# Patient Record
Sex: MALE | Race: WHITE | NOT HISPANIC OR LATINO | Employment: OTHER | URBAN - METROPOLITAN AREA
[De-identification: names, ages, dates, MRNs, and addresses within clinical notes are randomized per-mention and may not be internally consistent; named-entity substitution may affect disease eponyms.]

---

## 2017-01-05 ENCOUNTER — TRANSFERRED RECORDS (OUTPATIENT)
Dept: HEALTH INFORMATION MANAGEMENT | Facility: CLINIC | Age: 72
End: 2017-01-05

## 2017-01-10 ENCOUNTER — TELEPHONE (OUTPATIENT)
Dept: PEDIATRICS | Facility: CLINIC | Age: 72
End: 2017-01-10

## 2017-01-10 NOTE — TELEPHONE ENCOUNTER
Approval faxed received from [facility] Temazepam 30 mg cap approved from 12/11/16-01/10/2018 ID (PBP code): [id number]  Case ID: [id number]  Pharmacy notified//Ligia Renteria MA// January 10, 2017 1:55 PM

## 2017-01-10 NOTE — TELEPHONE ENCOUNTER
Letter from [facility]   The is formulary but requires PA, [facility] would not continue to pay for the drug after receiving max of 30 days temporary supply.     This writer initiated PA via [email or web address]   //Ligia Renteria MA// January 10, 2017 10:08 AM        This request has been approved  Hood Arizmendi (Gomez: [id number])   CaseId:[id number];Product Name:ST: High Risk Medications [Onfi, Generic alprazolam tablet, clonazepam, clorazepate, diazepam(oral, rectal), lorazepam(oral), oxazepam, temazepam] *[employer] MEDICARE*;Status:Approved;Coverage Start Date:12/11/2016;Coverage End Date:01/10/2018;  //Ligia Renteria MA// January 10, 2017 10:13 AM

## 2017-01-13 ENCOUNTER — MYC MEDICAL ADVICE (OUTPATIENT)
Dept: PEDIATRICS | Facility: CLINIC | Age: 72
End: 2017-01-13

## 2017-01-13 NOTE — TELEPHONE ENCOUNTER
Per chart review, PA for temazepam was approved on 1/10: Approval faxed received from [facility] Temazepam 30 mg cap approved from 12/11/16-01/10/2018    Colette Chiang RN  Message handled by Nurse Triage.

## 2017-01-26 ENCOUNTER — TELEPHONE (OUTPATIENT)
Dept: PEDIATRICS | Facility: CLINIC | Age: 72
End: 2017-01-26

## 2017-01-26 DIAGNOSIS — I25.10 CORONARY ARTERY DISEASE INVOLVING NATIVE CORONARY ARTERY OF NATIVE HEART WITHOUT ANGINA PECTORIS: Primary | ICD-10-CM

## 2017-01-26 RX ORDER — METOPROLOL SUCCINATE 50 MG/1
50 TABLET, EXTENDED RELEASE ORAL DAILY
Qty: 90 TABLET | Refills: 4 | Status: SHIPPED
Start: 2017-01-26 | End: 2018-02-23

## 2017-01-30 ENCOUNTER — TRANSFERRED RECORDS (OUTPATIENT)
Dept: HEALTH INFORMATION MANAGEMENT | Facility: CLINIC | Age: 72
End: 2017-01-30

## 2017-02-22 DIAGNOSIS — I25.10 CORONARY ARTERY DISEASE INVOLVING NATIVE HEART WITHOUT ANGINA PECTORIS, UNSPECIFIED VESSEL OR LESION TYPE: ICD-10-CM

## 2017-02-22 DIAGNOSIS — I25.10 CORONARY ARTERY DISEASE INVOLVING NATIVE CORONARY ARTERY OF NATIVE HEART WITHOUT ANGINA PECTORIS: ICD-10-CM

## 2017-02-22 RX ORDER — ISOSORBIDE MONONITRATE 30 MG/1
30 TABLET, EXTENDED RELEASE ORAL DAILY
Qty: 90 TABLET | Refills: 2 | Status: SHIPPED | OUTPATIENT
Start: 2017-02-22 | End: 2017-05-25

## 2017-02-23 ENCOUNTER — OFFICE VISIT (OUTPATIENT)
Dept: PEDIATRICS | Facility: CLINIC | Age: 72
End: 2017-02-23
Payer: COMMERCIAL

## 2017-02-23 VITALS
TEMPERATURE: 98 F | WEIGHT: 251.9 LBS | DIASTOLIC BLOOD PRESSURE: 80 MMHG | HEART RATE: 68 BPM | RESPIRATION RATE: 20 BRPM | OXYGEN SATURATION: 95 % | SYSTOLIC BLOOD PRESSURE: 116 MMHG | HEIGHT: 74 IN | BODY MASS INDEX: 32.33 KG/M2

## 2017-02-23 DIAGNOSIS — I10 ESSENTIAL HYPERTENSION: ICD-10-CM

## 2017-02-23 DIAGNOSIS — H33.22 RETINAL DETACHMENT, LEFT: ICD-10-CM

## 2017-02-23 DIAGNOSIS — Z01.818 PREOP GENERAL PHYSICAL EXAM: Primary | ICD-10-CM

## 2017-02-23 DIAGNOSIS — K51.90 ULCERATIVE COLITIS WITHOUT COMPLICATIONS, UNSPECIFIED LOCATION (H): ICD-10-CM

## 2017-02-23 DIAGNOSIS — I25.9 CHRONIC ISCHEMIC HEART DISEASE: ICD-10-CM

## 2017-02-23 PROCEDURE — 93000 ELECTROCARDIOGRAM COMPLETE: CPT | Performed by: INTERNAL MEDICINE

## 2017-02-23 PROCEDURE — 99214 OFFICE O/P EST MOD 30 MIN: CPT | Performed by: INTERNAL MEDICINE

## 2017-02-23 NOTE — MR AVS SNAPSHOT
After Visit Summary   2/23/2017    Hood DEVINE Page    MRN: [id number]           Patient Information     Date Of Birth          1945        Visit Information        Provider Department      2/23/2017 10:20 AM Micahel Oliver [facility]        Today's Diagnoses     Preop general physical exam    -  1    Retinal detachment, left        Chronic ischemic heart disease        Essential hypertension        Ulcerative colitis without complications, unspecified location (H)          Care Instructions      Before Your Surgery      Call your surgeon if there is any change in your health. This includes signs of a cold or flu (such as a sore throat, runny nose, cough, rash or fever).    Do not smoke, drink alcohol or take over the counter medicine (unless your surgeon or primary care doctor tells you to) for the 24 hours before and after surgery.    If you take prescribed drugs: Follow your doctor s orders about which medicines to take and which to stop until after surgery.    Eating and drinking prior to surgery: follow the instructions from your surgeon    Take a shower or bath the night before surgery. Use the soap your surgeon gave you to gently clean your skin. If you do not have soap from your surgeon, use your regular soap. Do not shave or scrub the surgery site.  Wear clean pajamas and have clean sheets on your bed.         Follow-ups after your visit        Who to contact     If you have questions or need follow up information about today's clinic visit or your schedule please contact [facility] directly at [phone].  Normal or non-critical lab and imaging results will be communicated to you by MyChart, letter or phone within 4 business days after the clinic has received the results. If you do not hear from us within 7 days, please contact the clinic through MyChart or phone. If you have a critical or abnormal lab result, we will notify you by phone as soon as  "possible.  Submit refill requests through [employer] or call your pharmacy and they will forward the refill request to us. Please allow 3 business days for your refill to be completed.          Additional Information About Your Visit        [employer] Information     [employer] gives you secure access to your electronic health record. If you see a primary care provider, you can also send messages to your care team and make appointments. If you have questions, please call your primary care clinic.  If you do not have a primary care provider, please call [phone] and they will assist you.        Care EveryWhere ID     This is your Care EveryWhere ID. This could be used by other organizations to access your [facility] medical records  [id number]        Your Vitals Were     Pulse Temperature Respirations Height Pulse Oximetry BMI (Body Mass Index)    68 98  F (36.7  C) (Oral) 20 6' 1.5\" (1.867 m) 95% 32.78 kg/m2       Blood Pressure from Last 3 Encounters:   02/23/17 116/80   12/13/16 124/78   09/23/16 117/71    Weight from Last 3 Encounters:   02/23/17 251 lb 14.4 oz (114.3 kg)   12/13/16 254 lb 9 oz (115.5 kg)   09/23/16 237 lb 4.8 oz (107.6 kg)              We Performed the Following     EKG 12-lead complete w/read - Clinics          Today's Medication Changes          These changes are accurate as of: 2/23/17 11:09 AM.  If you have any questions, ask your nurse or doctor.               Stop taking these medicines if you haven't already. Please contact your care team if you have questions.     prednisoLONE acetate 1 % ophthalmic susp   Commonly known as:  PRED FORTE   Stopped by:  Michael Oliver MD                    Primary Care Provider Office Phone # Fax #    Michael Oliver [phone]       [facility] [address] [place] [address]        Thank you!     Thank you for choosing [facility]  for your care. Our goal is always to provide you with excellent care. Hearing " back from our patients is one way we can continue to improve our services. Please take a few minutes to complete the written survey that you may receive in the mail after your visit with us. Thank you!             Your Updated Medication List - Protect others around you: Learn how to safely use, store and throw away your medicines at www.disposemymeds.org.          This list is accurate as of: 2/23/17 11:09 AM.  Always use your most recent med list.                   Brand Name Dispense Instructions for use    amLODIPine 2.5 MG tablet    NORVASC    90 tablet    Take 1 tablet (2.5 mg) by mouth daily       aspirin 81 MG tablet      Take 81 mg by mouth 2 times daily       balsalazide 750 MG capsule    COLAZAL     Take 2 tablets twice daily       CALCIUM 1200 PO      Take by mouth daily       calcium citrate + Tabs      Take 2,000 Units by mouth daily       cetirizine 10 MG tablet    zyrTEC    30 tablet    Take 10 mg by mouth daily       cholecalciferol 1000 UNIT tablet    vitamin D    100 tablet    Take 3,000 Units by mouth daily       fluticasone 50 MCG/ACT spray    FLONASE    9.9 mL    Spray 1-2 sprays into both nostrils daily       isosorbide mononitrate 30 MG 24 hr tablet    IMDUR    90 tablet    Take 1 tablet (30 mg) by mouth daily       melatonin 3 MG tablet      Take 1 tablet (3 mg) by mouth nightly as needed for sleep       METAMUCIL SMOOTH TEXTURE 63 % Powd   Generic drug:  psyllium     1 Bottle    Take 2 teaspoonful by mouth daily Mix in 8 ounces of water       metoprolol 50 MG 24 hr tablet    TOPROL-XL    90 tablet    Take 1 tablet (50 mg) by mouth daily       MIRALAX powder   Generic drug:  polyethylene glycol     510 g    Take 17 g by mouth daily       misc intestinal len regulat Caps      Take 1 capsule by mouth daily       Multi-vitamin Tabs tablet     100 tablet    Take 1 tablet by mouth daily       order for DME     1 each    Equipment being ordered: CPAP and supplies       ranolazine 500 MG 12 hr  tablet    RANEXA    180 tablet    Take 1 tablet (500 mg) by mouth every 12 hours       rosuvastatin 20 MG tablet    CRESTOR    90 tablet    Take 1 tablet (20 mg) by mouth daily       temazepam 30 MG capsule    RESTORIL    90 capsule    Take 1 capsule (30 mg) by mouth nightly as needed for sleep

## 2017-02-23 NOTE — NURSING NOTE
"Chief Complaint   Patient presents with     Pre-Op Exam       Initial /80 (BP Location: Right arm, Patient Position: Other (Comments), Cuff Size: Adult Large)  Pulse 68  Temp 98  F (36.7  C) (Oral)  Resp 20  Ht 6' 1.5\" (1.867 m)  Wt 251 lb 14.4 oz (114.3 kg)  SpO2 95%  BMI 32.78 kg/m2 Estimated body mass index is 32.78 kg/(m^2) as calculated from the following:    Height as of this encounter: 6' 1.5\" (1.867 m).    Weight as of this encounter: 251 lb 14.4 oz (114.3 kg).  Medication Reconciliation: complete   Stacey UMAÑA, DORIS,AAMA      "

## 2017-02-23 NOTE — PROGRESS NOTES
.    [facility]  [address]  [address]  [place] [address]  [phone]  Dept: [phone]    PRE-OP EVALUATION:  Today's date: 2017    Hood Arizmendi (: 1945) presents for pre-operative evaluation assessment as requested by Dr. Dent.  He requires evaluation and anesthesia risk assessment prior to undergoing surgery/procedure for treatment of Retina .  Proposed procedure: eye surgery    Date of Surgery/ Procedure: 2017  Time of Surgery/ Procedure: 10:30 am  Hospital/Surgical Facility: [facility]  Fax number for surgical facility: [phone] and [phone]  Primary Physician: Michael Oliver  Type of Anesthesia Anticipated: General    Patient has a Health Care Directive or Living Will:  YES     1. NO - Do you have a history of heart attack, stroke, stent, bypass or surgery on an artery in the head, neck, heart or legs?  2. yes - Do you ever have any pain or discomfort in your chest?  3. NO - Do you have a history of  Heart Failure?  4. NO - Are you troubled by shortness of breath when: walking on the level, up a slight hill or at night?  5. NO - Do you currently have a cold, bronchitis or other respiratory infection?  6. NO - Do you have a cough, shortness of breath or wheezing?  7. NO - Do you sometimes get pains in the calves of your legs when you walk?  8. NO - Do you or anyone in your family have previous history of blood clots?  9. NO - Do you or does anyone in your family have a serious bleeding problem such as prolonged bleeding following surgeries or cuts?  10. NO - Have you ever had problems with anemia or been told to take iron pills?  11. yes- Have you had any abnormal blood loss such as black, tarry or bloody stools, or abnormal vaginal bleeding?  12. NO - Have you ever had a blood transfusion?  13. NO - Have you or any of your relatives ever had problems with anesthesia?  14. yes - Do you have sleep apnea, excessive snoring or  daytime drowsiness?  15. NO - Do you have any prosthetic heart valves?  16. NO - Do you have prosthetic joints?  17. NO - Is there any chance that you may be pregnant?      HPI:                                                      Brief HPI related to upcoming procedure: Retinal scar tissue removal.       See problem list for active medical problems.  Problems all longstanding and stable, except as noted/documented.  See ROS for pertinent symptoms related to these conditions.                                                                                                  .  HYPERTENSION - Patient has longstanding history of mod-severe HTN , currently denies any symptoms referable to elevated blood pressure. Specifically denies chest pain, palpitations, dyspnea, orthopnea, PND or peripheral edema. Blood pressure readings have been in normal range. Current medication regimen is as listed below. Patient denies any side effects of medication.                                                                                                                                                                                          .  HYPERLIPIDEMIA - Patient has a long history of significant Hyperlipidemia requiring medication for treatment with recent good control. Patient reports no problems or side effects with the medication.                                                                                                                                                       .  CAD - Patient has a longstanding history of mod-severe CAD. Patient denies recent chest pain or NTG use, denies exercise induced dyspnea or PND. Last cath 2016 (below).                                                                                                                            .  obstructive sleep apnea: on continuous positive airway pressure.     SUMMARY:   1. Chronic stable angina.  2. Obstructive coronary artery disease to a very  small caliber, small  right posterolateral branch, known from prior coronary angiograms.  Otherwise non-obstructive coronary artery disease.  3. Normal LV systolic function.  4. Mildly elevated LVEDP (19mmHg)  5. No evidence of aortic valve stenosis by catheter pullback method.  6. No evidence of peripheral arterial disease.  7. Arterial access site closed with closure device    PLAN:   1. Aspirin 81 mg po daily lifelong.  2. Continued aggressive medical management of chronic stable angina  and lifestyle modification for cardiovascular risk factor  optimization.   3. Bedrest and access site monitoring per protocol.  4. Discharge today per protocol.    MEDICAL HISTORY:                                                      Patient Active Problem List    Diagnosis Date Noted     Insomnia, unspecified type 07/25/2016     Priority: Medium     Essential hypertension, hypertension with unspecified goal 07/25/2016     Priority: Medium     Coronary artery disease involving native coronary artery of native heart without angina pectoris 07/25/2016     Priority: Medium     Ulcerative colitis (H) 08/28/2014     Priority: Medium     Followed by [place] [facility]; every 2 years colonoscopy       Hyperlipidemia with target LDL less than 100 08/28/2014     Priority: Medium     Diagnosis updated by automated process. Provider to review and confirm.       Kidney stones 08/28/2014     Priority: Medium     Carotid artery stenosis      DARYL (obstructive sleep apnea) 08/28/2014     On CPAP 6/12, and temazepam alternating with zolpidem       Chronic ischemic heart disease 08/28/2014     Medical management.   Problem list name updated by automated process. Provider to review       Seasonal allergic rhinitis 08/28/2014     Vitamin D deficiency 08/28/2014     Problem list name updated by automated process. Provider to review        Past Medical History   Diagnosis Date     CAD (coronary artery disease)      small vessel disease     Carotid  artery stenosis      Chronic stable angina (H)      HTN (hypertension)      Hyperlipidemia      Past Surgical History   Procedure Laterality Date     Surgical history of -        tonsils     Surgical history of -        cystoscopy for kidney stones     Surgical history of -        s/p appy     Surgical history of -        nasal surgery     Coronary angiography adult order  2011, 2016     med tx, small vessels     Current Outpatient Prescriptions   Medication Sig Dispense Refill     isosorbide mononitrate (IMDUR) 30 MG 24 hr tablet Take 1 tablet (30 mg) by mouth daily 90 tablet 2     metoprolol (TOPROL-XL) 50 MG 24 hr tablet Take 1 tablet (50 mg) by mouth daily 90 tablet 4     fluticasone (FLONASE) 50 MCG/ACT spray Spray 1-2 sprays into both nostrils daily 9.9 mL 11     temazepam (RESTORIL) 30 MG capsule Take 1 capsule (30 mg) by mouth nightly as needed for sleep 90 capsule 1     prednisoLONE acetate (PRED FORTE) 1 % ophthalmic suspension 1 drop 4 times daily       amLODIPine (NORVASC) 2.5 MG tablet Take 1 tablet (2.5 mg) by mouth daily 90 tablet 1     aspirin 81 MG tablet Take 81 mg by mouth 2 times daily       Calcium Carbonate-Vit D-Min (CALCIUM 1200 PO) Take by mouth daily       rosuvastatin (CRESTOR) 20 MG tablet Take 1 tablet (20 mg) by mouth daily 90 tablet 3     ranolazine (RANEXA) 500 MG 12 hr tablet Take 1 tablet (500 mg) by mouth every 12 hours 180 tablet 4     balsalazide (COLAZAL) 750 MG capsule Take 2 tablets twice daily       ORDER FOR DME Equipment being ordered: CPAP and supplies 1 each 0     cetirizine (ZYRTEC) 10 MG tablet Take 10 mg by mouth daily  30 tablet 1     melatonin 3 MG tablet Take 1 tablet (3 mg) by mouth nightly as needed for sleep       psyllium (METAMUCIL SMOOTH TEXTURE) 63 % POWD Take 2 teaspoonful by mouth daily Mix in 8 ounces of water 1 Bottle 11     polyethylene glycol (MIRALAX) powder Take 17 g by mouth daily 510 g 1     Probiotic Product (MISC INTESTINAL GREGORY REGULAT) CAPS  "Take 1 capsule by mouth daily       multivitamin, therapeutic with minerals (MULTI-VITAMIN) TABS Take 1 tablet by mouth daily 100 tablet 3     cholecalciferol (VITAMIN D) 1000 UNIT tablet Take 3,000 Units by mouth daily  100 tablet 3     Multiple Minerals-Vitamins (CALCIUM CITRATE +) TABS Take 2,000 Units by mouth daily       OTC products: None, except as noted above    Allergies   Allergen Reactions     Sulfa Drugs       Latex Allergy: NO    Social History   Substance Use Topics     Smoking status: Never Smoker     Smokeless tobacco: Never Used     Alcohol use 4.2 oz/week     7 Standard drinks or equivalent per week      Comment: 1 per day     History   Drug Use No       REVIEW OF SYSTEMS:                                                    C: NEGATIVE for fever, chills, change in weight  I: NEGATIVE for worrisome rashes, moles or lesions  E: NEGATIVE for vision changes or irritation  E/M: NEGATIVE for ear, mouth and throat problems  R: NEGATIVE for significant cough or SOB  B: NEGATIVE for masses, tenderness or discharge  CV: NEGATIVE for chest pain, palpitations or peripheral edema  GI: NEGATIVE for nausea, abdominal pain, heartburn, or change in bowel habits  : NEGATIVE for frequency, dysuria, or hematuria  M: NEGATIVE for significant arthralgias or myalgia  N: NEGATIVE for weakness, dizziness or paresthesias  E: NEGATIVE for temperature intolerance, skin/hair changes  H: NEGATIVE for bleeding problems  P: NEGATIVE for changes in mood or affect    EXAM:                                                    /80 (BP Location: Right arm, Patient Position: Other (Comments), Cuff Size: Adult Large)  Pulse 68  Temp 98  F (36.7  C) (Oral)  Resp 20  Ht 6' 1.5\" (1.867 m)  Wt 251 lb 14.4 oz (114.3 kg)  SpO2 95%  BMI 32.78 kg/m2    GENERAL APPEARANCE: healthy, alert and no distress     EYES: EOMI, - PERRL     HENT: ear canals and TM's normal and nose and mouth without ulcers or lesions     NECK: no adenopathy, no " asymmetry, masses, or scars and thyroid normal to palpation     RESP: lungs clear to auscultation - no rales, rhonchi or wheezes     CV: regular rates and rhythm, normal S1 S2, no S3 or S4 and no murmur, click or rub -     ABDOMEN:  soft, nontender, no HSM or masses and bowel sounds normal     MS: extremities normal- no gross deformities noted, no evidence of inflammation in joints, FROM in all extremities.     SKIN: no suspicious lesions or rashes     NEURO: Normal strength and tone, sensory exam grossly normal, mentation intact and speech normal     PSYCH: mentation appears normal. and affect normal/bright     LYMPHATICS: No axillary, cervical, inguinal, or supraclavicular nodes    DIAGNOSTICS:                                                    EKG: appears normal, NSR, normal axis, normal intervals, no acute ST/T changes c/w ischemia, no LVH by voltage criteria, unchanged from previous tracings    Recent Labs   Lab Test  12/13/16   0942  08/26/16   1108  04/25/14   HGB  14.0  15.2   --    --    PLT  221  195   --    --    INR   --   0.91   --    --    NA  140  141   < >   --    POTASSIUM  3.7  4.2   < >  4.4   CR  0.88  0.84   < >  0.94   A1C   --    --    --   5.8    < > = values in this interval not displayed.        IMPRESSION:                                                    Reason for surgery/procedure: retinal scarring.   Diagnosis/reason for consult: preoperative evaluation     The proposed surgical procedure is considered LOW risk.    REVISED CARDIAC RISK INDEX  The patient has the following serious cardiovascular risks for perioperative complications such as (MI, PE, VFib and 3  AV Block):  Coronary Artery Disease (MI, positive stress test, angina, Qs on EKG)  INTERPRETATION: 1 risks: Class II (low risk - 0.9% complication rate)    The patient has the following additional risks for perioperative complications:  No identified additional risks      ICD-10-CM    1. Pre-operative cardiovascular examination  Z01.810 EKG 12-lead complete w/read - Clinics       RECOMMENDATIONS:                                                      (Z01.818) Preop general physical exam  (primary encounter diagnosis)  Comment:   Plan: Advised to stop all aspirin products and nonsteroidals (like ibuprofen or naproxen) for 10 days prior to procedure.  Advised follow surgical center's instructions re: arrival time and when to stop eating.     (H33.22) Retinal detachment, left  Comment:   Plan: Surgical and post-op care per primary surgical service.      (I25.9) Chronic ischemic heart disease  Comment:   Plan: secondary risk factor modification     (I10) Essential hypertension  Comment:   Plan: Well controlled.     (K51.90) Ulcerative colitis without complications, unspecified location (H)  Comment:   Plan: Continue current treatment.           Signed Electronically by: Michael Oliver MD    Copy of this evaluation report is provided to requesting physician.    [facility] Preop Guidelines

## 2017-03-01 DIAGNOSIS — G47.00 INSOMNIA, UNSPECIFIED TYPE: ICD-10-CM

## 2017-03-01 NOTE — TELEPHONE ENCOUNTER
TEMAZEPAM 30MG      Last Written Prescription Date:  9/13/2016  Last Fill Quantity: 90,   # refills: 1  Last Office Visit with [facility], [facility] or  Health prescribing provider: 2/23/2017  Future Office visit:       Routing refill request to provider for review/approval because:  Drug not on the [facility], [facility] or  Health refill protocol or controlled substance

## 2017-03-02 RX ORDER — TEMAZEPAM 30 MG
30 CAPSULE ORAL
Qty: 90 CAPSULE | Refills: 1 | Status: SHIPPED | OUTPATIENT
Start: 2017-03-02 | End: 2017-09-11

## 2017-03-06 DIAGNOSIS — I25.10 CORONARY ARTERY DISEASE INVOLVING NATIVE CORONARY ARTERY OF NATIVE HEART WITHOUT ANGINA PECTORIS: ICD-10-CM

## 2017-03-06 RX ORDER — AMLODIPINE BESYLATE 2.5 MG/1
2.5 TABLET ORAL DAILY
Qty: 90 TABLET | Refills: 3 | Status: SHIPPED | OUTPATIENT
Start: 2017-03-06 | End: 2017-05-16

## 2017-04-04 ENCOUNTER — OFFICE VISIT (OUTPATIENT)
Dept: CARDIOLOGY | Facility: CLINIC | Age: 72
End: 2017-04-04
Attending: NURSE PRACTITIONER
Payer: COMMERCIAL

## 2017-04-04 VITALS
DIASTOLIC BLOOD PRESSURE: 62 MMHG | HEIGHT: 74 IN | HEART RATE: 72 BPM | SYSTOLIC BLOOD PRESSURE: 124 MMHG | WEIGHT: 244 LBS | BODY MASS INDEX: 31.32 KG/M2

## 2017-04-04 DIAGNOSIS — I25.10 CORONARY ARTERY DISEASE INVOLVING NATIVE CORONARY ARTERY OF NATIVE HEART WITHOUT ANGINA PECTORIS: ICD-10-CM

## 2017-04-04 PROCEDURE — 99214 OFFICE O/P EST MOD 30 MIN: CPT | Performed by: INTERNAL MEDICINE

## 2017-04-04 NOTE — MR AVS SNAPSHOT
After Visit Summary   4/4/2017    Hood DEVINE Page    MRN: [id number]           Patient Information     Date Of Birth          1945        Visit Information        Provider Department      4/4/2017 9:45 AM Charlotte Kay MD [facility] AT [place]        Today's Diagnoses     Coronary artery disease involving native coronary artery of native heart without angina pectoris           Follow-ups after your visit        Additional Services     Follow-Up with Cardiologist                 Future tests that were ordered for you today     Open Future Orders        Priority Expected Expires Ordered    US Carotid Bilateral Routine 4/4/2018 5/9/2018 4/4/2017    Follow-Up with Cardiologist Routine 4/4/2018 8/17/2018 4/4/2017            Who to contact     If you have questions or need follow up information about today's clinic visit or your schedule please contact [facility] directly at [phone].  Normal or non-critical lab and imaging results will be communicated to you by [employer], letter or phone within 4 business days after the clinic has received the results. If you do not hear from us within 7 days, please contact the clinic through [employer] or phone. If you have a critical or abnormal lab result, we will notify you by phone as soon as possible.  Submit refill requests through [employer] or call your pharmacy and they will forward the refill request to us. Please allow 3 business days for your refill to be completed.          Additional Information About Your Visit        MyChart Information     [employer] gives you secure access to your electronic health record. If you see a primary care provider, you can also send messages to your care team and make appointments. If you have questions, please call your primary care clinic.  If you do not have a primary care provider, please call [phone] and they will assist you.        Care  "EveryWhere ID     This is your Care EveryWhere ID. This could be used by other organizations to access your [place] medical records  [id number]        Your Vitals Were     Pulse Height BMI (Body Mass Index)             72 1.867 m (6' 1.5\") 31.76 kg/m2          Blood Pressure from Last 3 Encounters:   04/04/17 124/62   02/23/17 116/80   12/13/16 124/78    Weight from Last 3 Encounters:   04/04/17 110.7 kg (244 lb)   02/23/17 114.3 kg (251 lb 14.4 oz)   12/13/16 115.5 kg (254 lb 9 oz)              We Performed the Following     Follow-Up with Cardiologist        Primary Care Provider Office Phone # Fax #    Michael Oliver [phone]       [address] [place] [address]        Thank you!     Thank you for choosing [facility] PHYSICIANS HEART AT [place]  for your care. Our goal is always to provide you with excellent care. Hearing back from our patients is one way we can continue to improve our services. Please take a few minutes to complete the written survey that you may receive in the mail after your visit with us. Thank you!             Your Updated Medication List - Protect others around you: Learn how to safely use, store and throw away your medicines at www.disposemymeds.org.          This list is accurate as of: 4/4/17  9:56 AM.  Always use your most recent med list.                   Brand Name Dispense Instructions for use    amLODIPine 2.5 MG tablet    NORVASC    90 tablet    Take 1 tablet (2.5 mg) by mouth daily       aspirin 81 MG tablet      Take 81 mg by mouth 2 times daily       balsalazide 750 MG capsule    COLAZAL     Take 2 tablets twice daily       CALCIUM 1200 PO      Take by mouth daily       calcium citrate + Tabs      Take 2,000 Units by mouth daily       cetirizine 10 MG tablet    zyrTEC    30 tablet    Take 10 mg by mouth daily       cholecalciferol 1000 UNIT tablet    vitamin D    100 tablet    Take 3,000 Units by mouth " daily       fluticasone 50 MCG/ACT spray    FLONASE    9.9 mL    Spray 1-2 sprays into both nostrils daily       isosorbide mononitrate 30 MG 24 hr tablet    IMDUR    90 tablet    Take 1 tablet (30 mg) by mouth daily       melatonin 3 MG tablet      Take 1 tablet (3 mg) by mouth nightly as needed for sleep       METAMUCIL SMOOTH TEXTURE 63 % Powd   Generic drug:  psyllium     1 Bottle    Take 2 teaspoonful by mouth daily Mix in 8 ounces of water       metoprolol 50 MG 24 hr tablet    TOPROL-XL    90 tablet    Take 1 tablet (50 mg) by mouth daily       MIRALAX powder   Generic drug:  polyethylene glycol     510 g    Take 17 g by mouth daily       misc intestinal len regulat Caps      Take 1 capsule by mouth daily       Multi-vitamin Tabs tablet     100 tablet    Take 1 tablet by mouth daily       order for DME     1 each    Equipment being ordered: CPAP and supplies       ranolazine 500 MG 12 hr tablet    RANEXA    180 tablet    Take 1 tablet (500 mg) by mouth every 12 hours       rosuvastatin 20 MG tablet    CRESTOR    90 tablet    Take 1 tablet (20 mg) by mouth daily       temazepam 30 MG capsule    RESTORIL    90 capsule    Take 1 capsule (30 mg) by mouth nightly as needed for sleep

## 2017-04-04 NOTE — PROGRESS NOTES
HISTORY OF PRESENT ILLNESS:  I had the pleasure to follow up with your patient, Mr. Hood Arizmendi, in the Cardiovascular Medicine Clinic.  As you recall, he is a very pleasant 71-year-old gentleman who I got acquainted with when he moved to the [place] area.  He has known coronary artery disease, namely small vessel disease.  The posterolateral branch is 100% occluded with moderate disease elsewhere.  His coronary angiogram was performed in 2016, which confirms these findings.        He more recently has been dealing with eye issues and goes to the [facility].  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        From a cardiac perspective, this gentleman is doing well.  He has not noticed any chest pain, PND, orthopnea, syncope or any other cardiac related concerns.  He is hopeful to get on treadmill in the near future once his eye issues have resolved.  He is due for cholesterol check, otherwise it appears that he is on appropriate guideline-directed medical therapy with no symptoms.      PHYSICAL EXAMINATION:   VITAL SIGNS:  Blood pressure 124/62, heart rate is 72.  Weight 244 pounds.   GENERAL:  The patient is alert, oriented, no apparent distress.   HEENT:  Oropharynx clear, no sinus tenderness.   NECK:  No JVP.  No carotid bruits.   CARDIOVASCULAR:  Distant heart tones.  Normal S1, S2.  No murmurs, gallops or rubs.   LUNGS:  Coarse but clear.   ABDOMEN:  Soft, nontender, nondistended.   EXTREMITIES:  No clubbing, cyanosis or edema.      ASSESSMENT:   1.  Chest discomfort with known single-vessel occlusive coronary artery disease, namely ostial posterolateral branch with ipsilateral collateral filling by angiogram in 2016.   2.  Mild disease elsewhere.   3.  Normal LV systolic function.   4.  Mild carotid artery stenosis.   5.  Recent eye surgery as noted above.   6.  Hypertension.   7.  Hyperlipidemia.       RECOMMENDATIONS:   1.  Atherosclerotic coronary artery disease, stable.  He appears to be on appropriate guideline-directed medical therapy.  Let us continue with his stable cardiac regimen.  No return of symptoms.   2.  Peripheral arterial disease.  Let us order carotid ultrasound for next year for a followup; his most recent study was from .   3.  Hypertension, stable.   4.  Hyperlipidemia.  Let us continue with statin therapy.  He is due for cholesterol check this year.  I will leave this in your capable hands.   5.  We will have him return to clinic in 1 year's time with a pre-clinic carotid ultrasound.         SIDRA PHILIPPE MD             D: 2017 09:55   T: 2017 15:16   MT: GERMAN      Name:     KEY HERMAN   MRN:      [id number]        Account:      [id number]   :      1945           Service Date: 2017      Document: [id number]

## 2017-04-04 NOTE — LETTER
4/4/2017    Michael Oliver MD  [facility]   [address] [place] [address]    RE: Hood Arizmendi       Dear Colleague,    I had the pleasure to follow up with your patient, Mr. Hood Arizmendi, in the Cardiovascular Medicine Clinic.  As you recall, he is a very pleasant 71-year-old gentleman who I got acquainted with when he moved to the [place] area.  He has known coronary artery disease, namely small vessel disease.  The posterolateral branch is 100% occluded with moderate disease elsewhere.  His coronary angiogram was performed in 2016, which confirms these findings.        He more recently has been dealing with eye issues and goes to the [facility].  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        From a cardiac perspective, this gentleman is doing well.  He has not noticed any chest pain, PND, orthopnea, syncope or any other cardiac related concerns.  He is hopeful to get on treadmill in the near future once his eye issues have resolved.  He is due for cholesterol check, otherwise it appears that he is on appropriate guideline-directed medical therapy with no symptoms.      PHYSICAL EXAMINATION:   VITAL SIGNS:  Blood pressure 124/62, heart rate is 72.  Weight 244 pounds.   GENERAL:  The patient is alert, oriented, no apparent distress.   HEENT:  Oropharynx clear, no sinus tenderness.   NECK:  No JVP.  No carotid bruits.   CARDIOVASCULAR:  Distant heart tones.  Normal S1, S2.  No murmurs, gallops or rubs.   LUNGS:  Coarse but clear.   ABDOMEN:  Soft, nontender, nondistended.   EXTREMITIES:  No clubbing, cyanosis or edema.     Outpatient Encounter Prescriptions as of 4/4/2017   Medication Sig Dispense Refill     ranolazine (RANEXA) 500 MG 12 hr tablet Take 1 tablet (500 mg) by mouth every 12 hours 180 tablet 1     rosuvastatin (CRESTOR) 20 MG tablet Take 1 tablet (20 mg) by mouth daily 90 tablet 1      [DISCONTINUED] amLODIPine (NORVASC) 2.5 MG tablet Take 1 tablet (2.5 mg) by mouth daily 90 tablet 3     temazepam (RESTORIL) 30 MG capsule Take 1 capsule (30 mg) by mouth nightly as needed for sleep 90 capsule 1     [DISCONTINUED] isosorbide mononitrate (IMDUR) 30 MG 24 hr tablet Take 1 tablet (30 mg) by mouth daily 90 tablet 2     metoprolol (TOPROL-XL) 50 MG 24 hr tablet Take 1 tablet (50 mg) by mouth daily 90 tablet 4     [DISCONTINUED] fluticasone (FLONASE) 50 MCG/ACT spray Spray 1-2 sprays into both nostrils daily 9.9 mL 11     aspirin 81 MG tablet Take 81 mg by mouth 2 times daily       Calcium Carbonate-Vit D-Min (CALCIUM 1200 PO) Take by mouth daily       balsalazide (COLAZAL) 750 MG capsule Take 2 tablets twice daily       ORDER FOR DME Equipment being ordered: CPAP and supplies 1 each 0     cetirizine (ZYRTEC) 10 MG tablet Take 10 mg by mouth daily  30 tablet 1     melatonin 3 MG tablet Take 1 tablet (3 mg) by mouth nightly as needed for sleep       psyllium (METAMUCIL SMOOTH TEXTURE) 63 % POWD Take 2 teaspoonful by mouth daily Mix in 8 ounces of water 1 Bottle 11     polyethylene glycol (MIRALAX) powder Take 17 g by mouth daily 510 g 1     Probiotic Product (MISC INTESTINAL GREGORY REGULAT) CAPS Take 1 capsule by mouth daily       multivitamin, therapeutic with minerals (MULTI-VITAMIN) TABS Take 1 tablet by mouth daily 100 tablet 3     cholecalciferol (VITAMIN D) 1000 UNIT tablet Take 3,000 Units by mouth daily  100 tablet 3     Multiple Minerals-Vitamins (CALCIUM CITRATE +) TABS Take 2,000 Units by mouth daily       No facility-administered encounter medications on file as of 4/4/2017.       ASSESSMENT:   1.  Chest discomfort with known single-vessel occlusive coronary artery disease, namely ostial posterolateral branch with ipsilateral collateral filling by angiogram in 2016.   2.  Mild disease elsewhere.   3.  Normal LV systolic function.   4.  Mild carotid artery stenosis.   5.  Recent eye surgery  as noted above.   6.  Hypertension.   7.  Hyperlipidemia.      RECOMMENDATIONS:   1.  Atherosclerotic coronary artery disease, stable.  He appears to be on appropriate guideline-directed medical therapy.  Let us continue with his stable cardiac regimen.  No return of symptoms.   2.  Peripheral arterial disease.  Let us order carotid ultrasound for next year for a followup; his most recent study was from 2015.   3.  Hypertension, stable.   4.  Hyperlipidemia.  Let us continue with statin therapy.  He is due for cholesterol check this year.  I will leave this in your capable hands.   5.  We will have him return to clinic in 1 year's time with a pre-clinic carotid ultrasound.     Again, thank you for allowing me to participate in the care of your patient.      Sincerely,    Charlotte Kay MD     [facility]

## 2017-04-12 ENCOUNTER — TRANSFERRED RECORDS (OUTPATIENT)
Dept: HEALTH INFORMATION MANAGEMENT | Facility: CLINIC | Age: 72
End: 2017-04-12

## 2017-05-16 DIAGNOSIS — I25.10 CORONARY ARTERY DISEASE INVOLVING NATIVE CORONARY ARTERY OF NATIVE HEART WITHOUT ANGINA PECTORIS: ICD-10-CM

## 2017-05-16 RX ORDER — AMLODIPINE BESYLATE 2.5 MG/1
2.5 TABLET ORAL DAILY
Qty: 90 TABLET | Refills: 2 | Status: SHIPPED | OUTPATIENT
Start: 2017-05-16 | End: 2017-12-11

## 2017-05-25 ENCOUNTER — OFFICE VISIT (OUTPATIENT)
Dept: CARDIOLOGY | Facility: CLINIC | Age: 72
End: 2017-05-25
Payer: COMMERCIAL

## 2017-05-25 ENCOUNTER — TELEPHONE (OUTPATIENT)
Dept: CARDIOLOGY | Facility: CLINIC | Age: 72
End: 2017-05-25

## 2017-05-25 VITALS
HEART RATE: 64 BPM | WEIGHT: 252 LBS | SYSTOLIC BLOOD PRESSURE: 112 MMHG | BODY MASS INDEX: 33.4 KG/M2 | DIASTOLIC BLOOD PRESSURE: 68 MMHG | HEIGHT: 73 IN

## 2017-05-25 DIAGNOSIS — I10 ESSENTIAL HYPERTENSION: ICD-10-CM

## 2017-05-25 DIAGNOSIS — I25.10 CORONARY ARTERY DISEASE INVOLVING NATIVE CORONARY ARTERY OF NATIVE HEART, ANGINA PRESENCE UNSPECIFIED: Primary | ICD-10-CM

## 2017-05-25 PROCEDURE — 99214 OFFICE O/P EST MOD 30 MIN: CPT | Performed by: PHYSICIAN ASSISTANT

## 2017-05-25 RX ORDER — NITROGLYCERIN 0.4 MG/1
TABLET SUBLINGUAL
Qty: 25 TABLET | Refills: 0 | Status: SHIPPED | OUTPATIENT
Start: 2017-05-25 | End: 2017-05-25

## 2017-05-25 RX ORDER — ISOSORBIDE MONONITRATE 60 MG/1
60 TABLET, EXTENDED RELEASE ORAL DAILY
Qty: 90 TABLET | Refills: 3 | Status: SHIPPED | OUTPATIENT
Start: 2017-05-25 | End: 2017-10-02

## 2017-05-25 RX ORDER — NITROGLYCERIN 400 UG/1
SPRAY ORAL
Qty: 4.9 G | Refills: 0 | Status: SHIPPED | OUTPATIENT
Start: 2017-05-25 | End: 2024-03-27

## 2017-05-25 NOTE — MR AVS SNAPSHOT
After Visit Summary   5/25/2017    Hood DEVINE Page    MRN: [id number]           Patient Information     Date Of Birth          1945        Visit Information        Provider Department      5/25/2017 2:30 PM Opal Ibarra PA-C [facility]        Today's Diagnoses     Coronary artery disease involving native coronary artery of native heart, angina presence unspecified    -  1    Essential hypertension          Care Instructions    Thank you for your [facility] visit today. Your provider has recommended the following:    Medication Changes:  Increase the isosorbide to 60mg once a day. OK to take 2 of the 30mg tabs. Call [facility] when you need a new bottle.  A new prescription for nitro spray was also provided.  Recommendations:  Please call if you are having more frequent episodes of chest discomfort or if you note any with exertion  Follow-up:  See Dr Kay for cardiology follow up in Spring 2018.    Reminder:  Please bring in all current medications, over the counter supplements and vitamin bottles to your next appointment.            [facility] HE[facility] CARE              Follow-ups after your visit        Who to contact     If you have questions or need follow up information about today's clinic visit or your schedule please contact [facility] directly at [phone].  Normal or non-critical lab and imaging results will be communicated to you by MyChart, letter or phone within 4 business days after the clinic has received the results. If you do not hear from us within 7 days, please contact the clinic through MyChart or phone. If you have a critical or abnormal lab result, we will notify you by phone as soon as possible.  Submit refill requests through [employer] or call your pharmacy and they will forward the refill request to us. Please allow 3 business days for your refill to be  "completed.          Additional Information About Your Visit        [employer] Information     [employer] gives you secure access to your electronic health record. If you see a primary care provider, you can also send messages to your care team and make appointments. If you have questions, please call your primary care clinic.  If you do not have a primary care provider, please call [phone] and they will assist you.        Care EveryWhere ID     This is your Care EveryWhere ID. This could be used by other organizations to access your [place] medical records  [id number]        Your Vitals Were     Pulse Height BMI (Body Mass Index)             64 1.842 m (6' 0.5\") 33.71 kg/m2          Blood Pressure from Last 3 Encounters:   05/25/17 112/68   04/04/17 124/62   02/23/17 116/80    Weight from Last 3 Encounters:   05/25/17 114.3 kg (252 lb)   04/04/17 110.7 kg (244 lb)   02/23/17 114.3 kg (251 lb 14.4 oz)              Today, you had the following     No orders found for display         Today's Medication Changes          These changes are accurate as of: 5/25/17  3:14 PM.  If you have any questions, ask your nurse or doctor.               Start taking these medicines.        Dose/Directions    * nitroglycerin 0.4 MG sublingual tablet   Commonly known as:  NITROSTAT   Used for:  Coronary artery disease involving native coronary artery of native heart, angina presence unspecified   Started by:  Opal Ibarra PA-C        For chest pain place 1 tablet under the tongue every 5 minutes for 3 doses. If symptoms persist 5 minutes after 1st dose call 911.   Quantity:  25 tablet   Refills:  0       * nitroglycerin 0.4 MG/SPRAY spray   Commonly known as:  NITROLINGUAL   Started by:  Opal Ibarra PA-C        For chest pain spray 1 spray under tongue every 5 minutes for 3 doses. If symptoms persist 5 minutes after 1st dose call 911.   Quantity:  4.9 g   Refills:  0       * Notice:  This list has 2 medication(s) " that are the same as other medications prescribed for you. Read the directions carefully, and ask your doctor or other care provider to review them with you.      These medicines have changed or have updated prescriptions.        Dose/Directions    isosorbide mononitrate 60 MG 24 hr tablet   Commonly known as:  IMDUR   This may have changed:    - medication strength  - how much to take   Used for:  Coronary artery disease involving native coronary artery of native heart, angina presence unspecified   Changed by:  Opal Ibarra, MORENITA        Dose:  60 mg   Take 1 tablet (60 mg) by mouth daily   Quantity:  90 tablet   Refills:  3            Where to get your medicines      These medications were sent to [facility] - [place], [place] - [address]  [address], [place] [facility] [address]     Phone:  [phone]     isosorbide mononitrate 60 MG 24 hr tablet         These medications were sent to [facility] [address] [facility] [address] [place] [address] & [facility]  [address] [address] [address]     Phone:  [phone]     nitroglycerin 0.4 MG sublingual tablet    nitroglycerin 0.4 MG/SPRAY spray                Primary Care Provider Office Phone # Fax #    Michael Oliver [phone]       [address] [place] [address]        Thank you!     Thank you for choosing [facility] PHYSICIANS HEART AT [place]  for your care. Our goal is always to provide you with excellent care. Hearing back from our patients is one way we can continue to improve our services. Please take a few minutes to complete the written survey that you may receive in the mail after your visit with us. Thank you!             Your Updated Medication List - Protect others around you: Learn how to safely use, store and throw away your medicines at www.disposemymeds.org.          This list is accurate as of: 5/25/17   3:14 PM.  Always use your most recent med list.                   Brand Name Dispense Instructions for use    amLODIPine 2.5 MG tablet    NORVASC    90 tablet    Take 1 tablet (2.5 mg) by mouth daily       aspirin 81 MG tablet      Take 81 mg by mouth 2 times daily       balsalazide 750 MG capsule    COLAZAL     Take 2 tablets twice daily       CALCIUM 1200 PO      Take by mouth daily       calcium citrate + Tabs      Take 2,000 Units by mouth daily       cetirizine 10 MG tablet    zyrTEC    30 tablet    Take 10 mg by mouth daily       cholecalciferol 1000 UNIT tablet    vitamin D    100 tablet    Take 3,000 Units by mouth daily       isosorbide mononitrate 60 MG 24 hr tablet    IMDUR    90 tablet    Take 1 tablet (60 mg) by mouth daily       melatonin 3 MG tablet      Take 1 tablet (3 mg) by mouth nightly as needed for sleep       METAMUCIL SMOOTH TEXTURE 63 % Powd   Generic drug:  psyllium     1 Bottle    Take 2 teaspoonful by mouth daily Mix in 8 ounces of water       metoprolol 50 MG 24 hr tablet    TOPROL-XL    90 tablet    Take 1 tablet (50 mg) by mouth daily       MIRALAX powder   Generic drug:  polyethylene glycol     510 g    Take 17 g by mouth daily       misc intestinal len regulat Caps      Take 1 capsule by mouth daily       Multi-vitamin Tabs tablet     100 tablet    Take 1 tablet by mouth daily       * nitroglycerin 0.4 MG sublingual tablet    NITROSTAT    25 tablet    For chest pain place 1 tablet under the tongue every 5 minutes for 3 doses. If symptoms persist 5 minutes after 1st dose call 911.       * nitroglycerin 0.4 MG/SPRAY spray    NITROLINGUAL    4.9 g    For chest pain spray 1 spray under tongue every 5 minutes for 3 doses. If symptoms persist 5 minutes after 1st dose call 911.       order for DME     1 each    Equipment being ordered: CPAP and supplies       ranolazine 500 MG 12 hr tablet    RANEXA    180 tablet    Take 1 tablet (500 mg) by mouth every 12 hours       rosuvastatin 20 MG  tablet    CRESTOR    90 tablet    Take 1 tablet (20 mg) by mouth daily       temazepam 30 MG capsule    RESTORIL    90 capsule    Take 1 capsule (30 mg) by mouth nightly as needed for sleep       * Notice:  This list has 2 medication(s) that are the same as other medications prescribed for you. Read the directions carefully, and ask your doctor or other care provider to review them with you.

## 2017-05-25 NOTE — TELEPHONE ENCOUNTER
Patient calling to report that the last fes days he has woken up in the morning with what he feels to be indigestion.. The patient also noting that he is feeling a little off but was unable to describe it.. indigestion symptoms are only present in the AM and no other symptoms reported with exercise or activities of daily living.. No new foods added to the patients diet and no recent medication changes.  The patients other concern was his BP being a little elevated.. Reporting a BP of 130/85 which he felt was very high for him.  In review of the patients symptoms and his chart - symptoms similar to last August when he saw Dr. Kay - the patient proceeded to have a coronary angiogram which showed no new findings.  Will get the patient in today to have his symptoms and blood pressure assessed in clinic.. There were no other questions or concerns at this time. Sasha Holman

## 2017-05-25 NOTE — LETTER
"5/25/2017    Michael Oliver MD  [address] [place] [address]    RE: Hood Arizmendi       Dear Colleague,    I had the pleasure of seeing Hood Arizmendi in the [facility].    Mr. Arizmendi is a pleasant 72-year-old gentleman who was added onto my schedule today when he called earlier today with complaints of \"indigestion.\"  This is the first time I am meeting the patient.      His past cardiovascular history includes coronary artery disease, namely small vessel disease.  He has a posterior lateral branch which is 100% occluded with ipsilateral collateral filling noted on prior angiography.  He was noted to have moderate disease elsewhere in the coronary tree.  His last angiogram was in 08/2016 and overall findings were stable.  He was last evaluated by Dr. Kay in April.  At that time, he was doing well, and there were no specific cardiac concerns.      The patient presents today stating that 3 days ago he woke from sleep with a feeling of \"indigestion.\" He did try a spray of sublingual nitro; however, notes that the bottle was at least 6 years old.  He did not have any relief or side effects from the medication.  He tried some Gaviscon, but this also did not improve his symptoms.  The whole episode lasted about an hour.  He did not have any other associated symptoms.  He did not have a recurrence the day after or yesterday, but again this morning woke with a similar sensation.  Today's episode was not as intense and only lasted about 10 minutes.  He has not noted any exertional symptoms or decrease in exertional tolerance.  He did tell me that last fall his amlodipine and isosorbide were decreased due to fatigue and low blood pressure.  He has not had any other symptoms recently.      CURRENT CARDIAC MEDICATIONS:   1.  Imdur 60 mg daily.   2.  Amlodipine 2.5 mg daily.     3.  Ranexa 500 mg b.i.d.   4.  Crestor 20 mg daily.   5.  Toprol-XL 50 mg daily.   6.  Aspirin 81 mg " "daily.   7.  Sublingual nitro spray p.r.n.      The remainder of his medications, allergies and review of systems were reviewed and as are documented separately.      PHYSICAL EXAMINATION:   GENERAL:  The patient is a pleasant 72-year-old gentleman who appears his stated age.  He is in no apparent distress.   VITAL SIGNS:  His blood pressure is 112/68, pulse is 64, weight is 252 pounds.  This is stable.   LUNGS:  Clear to auscultation bilaterally.   CARDIAC:  Reveals a regular rate and rhythm, no murmurs appreciated.   ABDOMEN:  Soft, nontender, nondistended.   EXTREMITIES:  Lower extremities show no evidence of edema.   NEUROLOGIC:  Alert and oriented.      ASSESSMENT:     1.  Two recent episodes of \"indigestion.\"  The etiology of this is somewhat unclear at this point.  He does have a history of coronary artery disease, although these symptoms are somewhat different than what he has experienced when he has had angina previously.  He did undergo coronary angiography within the last 9 months which showed mild to possibly moderate disease aside from a total occlusion of the posterior lateral branch which filled via ipsilateral collaterals unchanged from prior.  He did have a reduction in 2 of his antianginal medications within the past 6-9 months or so.  To start with, I had suggested we increase his isosorbide and monitor for additional symptoms.  I also sent in a prescription for a new bottle of sublingual nitro spray.  If the patient has recurrence or additional symptoms, I have asked him to contact the office and we can consider additional testing.   2.  Known coronary artery disease, as above.  He is on aspirin and statin therapy.   3.  Mild carotid artery stenosis.  Dr. Kay had suggested we repeat a carotid ultrasound in the spring of 2018.      PLAN:   1.  The patient will increase his Imdur to 60 mg daily.   2.  Refill on his sublingual nitro was provided.   3.  At this time, he prefers to call us with any " additional symptoms.  We will keep his planned followup with Dr. Kay in the spring of 2018.  Of course, I encouraged him again to contact us with any change in his symptomatology, new symptoms or other questions or concerns.     Again, thank you for allowing me to participate in the care of your patient.      Sincerely,    Opal Ibarra PA-C     [facility]

## 2017-05-25 NOTE — PROGRESS NOTES
Please see separate dictation for HPI, PHYSICAL EXAM AND IMPRESSION/PLAN.    CURRENT MEDICATIONS:  Current Outpatient Prescriptions   Medication Sig Dispense Refill     amLODIPine (NORVASC) 2.5 MG tablet Take 1 tablet (2.5 mg) by mouth daily 90 tablet 2     ranolazine (RANEXA) 500 MG 12 hr tablet Take 1 tablet (500 mg) by mouth every 12 hours 180 tablet 1     rosuvastatin (CRESTOR) 20 MG tablet Take 1 tablet (20 mg) by mouth daily 90 tablet 1     temazepam (RESTORIL) 30 MG capsule Take 1 capsule (30 mg) by mouth nightly as needed for sleep 90 capsule 1     isosorbide mononitrate (IMDUR) 30 MG 24 hr tablet Take 1 tablet (30 mg) by mouth daily 90 tablet 2     metoprolol (TOPROL-XL) 50 MG 24 hr tablet Take 1 tablet (50 mg) by mouth daily 90 tablet 4     aspirin 81 MG tablet Take 81 mg by mouth 2 times daily       balsalazide (COLAZAL) 750 MG capsule Take 2 tablets twice daily       ORDER FOR DME Equipment being ordered: CPAP and supplies 1 each 0     cetirizine (ZYRTEC) 10 MG tablet Take 10 mg by mouth daily  30 tablet 1     melatonin 3 MG tablet Take 1 tablet (3 mg) by mouth nightly as needed for sleep       psyllium (METAMUCIL SMOOTH TEXTURE) 63 % POWD Take 2 teaspoonful by mouth daily Mix in 8 ounces of water 1 Bottle 11     polyethylene glycol (MIRALAX) powder Take 17 g by mouth daily 510 g 1     Probiotic Product (MISC INTESTINAL GREGORY REGULAT) CAPS Take 1 capsule by mouth daily       multivitamin, therapeutic with minerals (MULTI-VITAMIN) TABS Take 1 tablet by mouth daily 100 tablet 3     cholecalciferol (VITAMIN D) 1000 UNIT tablet Take 3,000 Units by mouth daily  100 tablet 3     Multiple Minerals-Vitamins (CALCIUM CITRATE +) TABS Take 2,000 Units by mouth daily       Calcium Carbonate-Vit D-Min (CALCIUM 1200 PO) Take by mouth daily         ALLERGIES:     Allergies   Allergen Reactions     Sulfa Drugs        PAST MEDICAL HISTORY:  Past Medical History:   Diagnosis Date     CAD (coronary artery disease)      small vessel disease     Carotid artery stenosis      Chronic stable angina (H)      HTN (hypertension)      Hyperlipidemia        PAST SURGICAL HISTORY:  Past Surgical History:   Procedure Laterality Date     CORONARY ANGIOGRAPHY ADULT ORDER  2011, 2016    med tx, small vessels     SURGICAL HISTORY OF -       tonsils     SURGICAL HISTORY OF -       cystoscopy for kidney stones     SURGICAL HISTORY OF -       s/p appy     SURGICAL HISTORY OF -       nasal surgery       SOCIAL HISTORY:  Social History     Social History     Marital status:      Spouse name: N/A     Number of children: N/A     Years of education: N/A     Social History Main Topics     Smoking status: Never Smoker     Smokeless tobacco: Never Used     Alcohol use 4.2 oz/week     7 Standard drinks or equivalent per week      Comment: 1 per day     Drug use: No     Sexual activity: Yes     Partners: Female     Other Topics Concern     Caffeine Concern No     1 cup      Sleep Concern Yes     sleep apnea , c-pap      Special Diet No     Exercise No     Parent/Sibling W/ Cabg, Mi Or Angioplasty Before 65f 55m? Yes     unknown     Social History Narrative       FAMILY HISTORY:  Family History   Problem Relation Age of Onset     C.A.D. Mother      C.A.D. Maternal Grandfather      CANCER Father      lung cancer, smoker.        Review of Systems:  Skin:  Negative       Eyes:  Positive for   reading glasses, detached retna repair  ENT:  Negative postnasal drainage    Respiratory:  Negative for dyspnea on exertion     Cardiovascular:    Positive for;fatigue-stable; indigestion woke pt from sleep, used 2 sprays NTG with little effect  (Body becomes extremely warm, no sweating)  Gastroenterology: Positive for heartburn    Genitourinary:  Negative nocturia 1x per night  Musculoskeletal:  Negative joint pain    Neurologic:  Negative      Psychiatric:  Negative sleep disturbances    Heme/Lymph/Imm:  Positive for allergies    Endocrine:  Negative          Reviewed. Remainder of the note dictated.    Opal Ibarra PA-C

## 2017-05-25 NOTE — PATIENT INSTRUCTIONS
Thank you for your [facility] visit today. Your provider has recommended the following:    Medication Changes:  Increase the isosorbide to 60mg once a day. OK to take 2 of the 30mg tabs. Call [facility] when you need a new bottle.  A new prescription for nitro spray was also provided.  Recommendations:  Please call if you are having more frequent episodes of chest discomfort or if you note any with exertion  Follow-up:  See Dr Kay for cardiology follow up in Spring 2018 with a carotid ultrasound prior.    Reminder:  Please bring in all current medications, over the counter supplements and vitamin bottles to your next appointment.            [facility]

## 2017-05-26 NOTE — PROGRESS NOTES
"HISTORY OF PRESENT ILLNESS:  Mr. Arizmendi is a pleasant 72-year-old gentleman who was added onto my schedule today when he called earlier today with complaints of \"indigestion.\"  This is the first time I am meeting the patient.      His past cardiovascular history includes coronary artery disease, namely small vessel disease.  He has a posterior lateral branch which is 100% occluded with ipsilateral collateral filling noted on prior angiography.  He was noted to have moderate disease elsewhere in the coronary tree.  His last angiogram was in 08/2016 and overall findings were stable.  He was last evaluated by Dr. Kay in April.  At that time, he was doing well, and there were no specific cardiac concerns.      The patient presents today stating that 3 days ago he woke from sleep with a feeling of \"indigestion.\" He did try a spray of sublingual nitro; however, notes that the bottle was at least 6 years old.  He did not have any relief or side effects from the medication.  He tried some Gaviscon, but this also did not improve his symptoms.  The whole episode lasted about an hour.  He did not have any other associated symptoms.  He did not have a recurrence the day after or yesterday, but again this morning woke with a similar sensation.  Today's episode was not as intense and only lasted about 10 minutes.  He has not noted any exertional symptoms or decrease in exertional tolerance.  He did tell me that last fall his amlodipine and isosorbide were decreased due to fatigue and low blood pressure.  He has not had any other symptoms recently.      CURRENT CARDIAC MEDICATIONS:   1.  Imdur 60 mg daily.   2.  Amlodipine 2.5 mg daily.     3.  Ranexa 500 mg b.i.d.   4.  Crestor 20 mg daily.   5.  Toprol-XL 50 mg daily.   6.  Aspirin 81 mg daily.   7.  Sublingual nitro spray p.r.n.      The remainder of his medications, allergies and review of systems were reviewed and as are documented separately.      PHYSICAL EXAMINATION: " "  GENERAL:  The patient is a pleasant 72-year-old gentleman who appears his stated age.  He is in no apparent distress.   VITAL SIGNS:  His blood pressure is 112/68, pulse is 64, weight is 252 pounds.  This is stable.   LUNGS:  Clear to auscultation bilaterally.   CARDIAC:  Reveals a regular rate and rhythm, no murmurs appreciated.   ABDOMEN:  Soft, nontender, nondistended.   EXTREMITIES:  Lower extremities show no evidence of edema.   NEUROLOGIC:  Alert and oriented.      ASSESSMENT:     1.  Two recent episodes of \"indigestion.\"  The etiology of this is somewhat unclear at this point.  He does have a history of coronary artery disease, although these symptoms are somewhat different than what he has experienced when he has had angina previously.  He did undergo coronary angiography within the last 9 months which showed mild to possibly moderate disease aside from a total occlusion of the posterior lateral branch which filled via ipsilateral collaterals unchanged from prior.  He did have a reduction in 2 of his antianginal medications within the past 6-9 months or so.  To start with, I had suggested we increase his isosorbide and monitor for additional symptoms.  I also sent in a prescription for a new bottle of sublingual nitro spray.  If the patient has recurrence or additional symptoms, I have asked him to contact the office and we can consider additional testing.   2.  Known coronary artery disease, as above.  He is on aspirin and statin therapy.   3.  Mild carotid artery stenosis.  Dr. Kay had suggested we repeat a carotid ultrasound in the spring of 2018.      PLAN:   1.  The patient will increase his Imdur to 60 mg daily.   2.  Refill on his sublingual nitro was provided.   3.  At this time, he prefers to call us with any additional symptoms.  We will keep his planned followup with Dr. Kay in the spring of 2018.  Of course, I encouraged him again to contact us with any change in his symptomatology, new " symptoms or other questions or concerns.         MARTINEZ العلي PA-C             D: 2017 15:56   T: 2017 13:08   MT: ANAND      Name:     KEY HERMAN   MRN:      [id number]        Account:      [id number]   :      1945           Service Date: 2017      Document: [id number]

## 2017-06-09 ENCOUNTER — TRANSFERRED RECORDS (OUTPATIENT)
Dept: HEALTH INFORMATION MANAGEMENT | Facility: CLINIC | Age: 72
End: 2017-06-09

## 2017-06-14 ENCOUNTER — DOCUMENTATION ONLY (OUTPATIENT)
Dept: OTHER | Facility: CLINIC | Age: 72
End: 2017-06-14

## 2017-06-14 DIAGNOSIS — Z71.89 ACP (ADVANCE CARE PLANNING): Chronic | ICD-10-CM

## 2017-07-28 ENCOUNTER — TRANSFERRED RECORDS (OUTPATIENT)
Dept: HEALTH INFORMATION MANAGEMENT | Facility: CLINIC | Age: 72
End: 2017-07-28

## 2017-07-29 DIAGNOSIS — E78.5 HYPERLIPIDEMIA WITH TARGET LDL LESS THAN 100: Primary | ICD-10-CM

## 2017-07-29 DIAGNOSIS — I25.10 CORONARY ARTERY DISEASE INVOLVING NATIVE CORONARY ARTERY OF NATIVE HEART WITHOUT ANGINA PECTORIS: ICD-10-CM

## 2017-07-30 NOTE — TELEPHONE ENCOUNTER
ranolazine (RANEXA) 500 MG 12 hr tablet      Last Written Prescription Date:  3/17/2017  Last Fill Quantity: 180,   # refills: 1  Last Office Visit with [facility], [facility] or [facility] prescribing provider: 2/23/2017  Future Office visit:       Routing refill request to provider for review/approval because:  Drug not on the [facility], [facility] or [facility] refill protocol or controlled substance    rosuvastatin (CRESTOR) 20 MG tablet     Last Written Prescription Date: 3/17/2017  Last Fill Quantity: 90, # refills: 1  Last Office Visit with [facility], [facility] or [facility] prescribing provider: 2/23/2017       Lab Results   Component Value Date    CHOL 148 06/30/2016     Lab Results   Component Value Date    HDL 55 06/30/2016     Lab Results   Component Value Date    LDL 70 06/30/2016     Lab Results   Component Value Date    TRIG 116 06/30/2016     Lab Results   Component Value Date    CHOLHDLRATIO 3.2 12/05/2014

## 2017-08-01 NOTE — TELEPHONE ENCOUNTER
Ranexa not on list to refill.     Patient due for fasting lipid. Requesting 3 month mail order.    Please send to pharmacy if approved.    Route to station C nurse to help patient schedule lab.    Mary Pepe RN

## 2017-08-02 RX ORDER — RANOLAZINE 500 MG/1
TABLET, FILM COATED, EXTENDED RELEASE ORAL
Qty: 180 TABLET | Refills: 3 | Status: SHIPPED | OUTPATIENT
Start: 2017-08-02 | End: 2018-10-01

## 2017-08-02 RX ORDER — ROSUVASTATIN CALCIUM 20 MG/1
TABLET, COATED ORAL
Qty: 90 TABLET | Refills: 3 | Status: SHIPPED | OUTPATIENT
Start: 2017-08-02 | End: 2018-05-07

## 2017-08-03 NOTE — TELEPHONE ENCOUNTER
LM to call and schedule a lab only appointment.     Holly Anderson MA   August 3, 2017,  2:42 PM

## 2017-08-08 DIAGNOSIS — E78.5 HYPERLIPIDEMIA WITH TARGET LDL LESS THAN 100: ICD-10-CM

## 2017-08-08 DIAGNOSIS — I25.10 CORONARY ARTERY DISEASE INVOLVING NATIVE CORONARY ARTERY OF NATIVE HEART WITHOUT ANGINA PECTORIS: ICD-10-CM

## 2017-08-08 LAB
CHOLEST SERPL-MCNC: 157 MG/DL
HDLC SERPL-MCNC: 61 MG/DL
LDLC SERPL CALC-MCNC: 58 MG/DL
NONHDLC SERPL-MCNC: 96 MG/DL
TRIGL SERPL-MCNC: 192 MG/DL

## 2017-08-08 PROCEDURE — 80061 LIPID PANEL: CPT | Performed by: INTERNAL MEDICINE

## 2017-08-08 PROCEDURE — 36415 COLL VENOUS BLD VENIPUNCTURE: CPT | Performed by: INTERNAL MEDICINE

## 2017-09-11 DIAGNOSIS — G47.00 INSOMNIA, UNSPECIFIED TYPE: ICD-10-CM

## 2017-09-11 RX ORDER — TEMAZEPAM 30 MG
30 CAPSULE ORAL
Qty: 90 CAPSULE | Refills: 1 | Status: SHIPPED | OUTPATIENT
Start: 2017-09-11 | End: 2018-04-12

## 2017-09-11 NOTE — TELEPHONE ENCOUNTER
temazepam (RESTORIL) 30 MG capsule      Last Written Prescription Date:  03/02/2017  Last Fill Quantity: 90,   # refills: 1  Last Office Visit with [facility] [place], [facility] or [facility] prescribing provider: 2/23/2017    Future Office visit:       Routing refill request to provider for review/approval because:  Drug not on the [facility] [place], [facility] or [facility] refill protocol or controlled substance

## 2017-10-02 DIAGNOSIS — I25.10 CORONARY ARTERY DISEASE INVOLVING NATIVE CORONARY ARTERY OF NATIVE HEART, ANGINA PRESENCE UNSPECIFIED: ICD-10-CM

## 2017-10-02 RX ORDER — ISOSORBIDE MONONITRATE 60 MG/1
60 TABLET, EXTENDED RELEASE ORAL DAILY
Qty: 90 TABLET | Refills: 2 | Status: SHIPPED | OUTPATIENT
Start: 2017-10-02 | End: 2018-05-08

## 2017-12-07 ENCOUNTER — E-VISIT (OUTPATIENT)
Dept: PEDIATRICS | Facility: CLINIC | Age: 72
End: 2017-12-07
Payer: COMMERCIAL

## 2017-12-07 ENCOUNTER — MYC MEDICAL ADVICE (OUTPATIENT)
Dept: PEDIATRICS | Facility: CLINIC | Age: 72
End: 2017-12-07

## 2017-12-07 DIAGNOSIS — G47.00 INSOMNIA, UNSPECIFIED TYPE: Primary | ICD-10-CM

## 2017-12-07 PROCEDURE — 99444 ZZC PHYSICIAN ONLINE EVALUATION & MANAGEMENT SERVICE: CPT | Performed by: INTERNAL MEDICINE

## 2017-12-07 RX ORDER — ZOLPIDEM TARTRATE 10 MG/1
10 TABLET ORAL
Qty: 30 TABLET | Refills: 0 | Status: SHIPPED | OUTPATIENT
Start: 2017-12-07 | End: 2018-05-15

## 2017-12-07 NOTE — TELEPHONE ENCOUNTER
Patient is requesting new script, Ambien.  E-visit request sent to patient.  Colette Chiang RN  Message handled by Nurse Triage.

## 2017-12-07 NOTE — MR AVS SNAPSHOT
After Visit Summary   12/7/2017    Hood DEVINE Page    MRN: [id number]           Patient Information     Date Of Birth          1945        Visit Information        Provider Department      12/7/2017 2:07 PM Michael Oliver MD [facility]        Today's Diagnoses     Insomnia, unspecified type    -  1       Follow-ups after your visit        Who to contact     If you have questions or need follow up information about today's clinic visit or your schedule please contact [facility] directly at [phone].  Normal or non-critical lab and imaging results will be communicated to you by Inmagichart, letter or phone within 4 business days after the clinic has received the results. If you do not hear from us within 7 days, please contact the clinic through myRetet or phone. If you have a critical or abnormal lab result, we will notify you by phone as soon as possible.  Submit refill requests through GroundWork or call your pharmacy and they will forward the refill request to us. Please allow 3 business days for your refill to be completed.          Additional Information About Your Visit        MyChart Information     GroundWork gives you secure access to your electronic health record. If you see a primary care provider, you can also send messages to your care team and make appointments. If you have questions, please call your primary care clinic.  If you do not have a primary care provider, please call [phone] and they will assist you.        Care EveryWhere ID     This is your Care EveryWhere ID. This could be used by other organizations to access your [facility] medical records  [id number]         Blood Pressure from Last 3 Encounters:   05/25/17 112/68   04/04/17 124/62   02/23/17 116/80    Weight from Last 3 Encounters:   05/25/17 252 lb (114.3 kg)   04/04/17 244 lb (110.7 kg)   02/23/17 251 lb 14.4 oz (114.3 kg)              Today, you had the following     No orders found for  display         Today's Medication Changes          These changes are accurate as of: 12/7/17  2:41 PM.  If you have any questions, ask your nurse or doctor.               Start taking these medicines.        Dose/Directions    zolpidem 10 MG tablet   Commonly known as:  AMBIEN   Used for:  Insomnia, unspecified type   Started by:  Michael Oliver MD        Dose:  10 mg   Take 1 tablet (10 mg) by mouth nightly as needed for sleep   Quantity:  30 tablet   Refills:  0            Where to get your medicines      Some of these will need a paper prescription and others can be bought over the counter.  Ask your nurse if you have questions.     Bring a paper prescription for each of these medications     zolpidem 10 MG tablet                Primary Care Provider Office Phone # Fax #    Michael Oliver [phone] [address] [place] [address]        Equal Access to Services     [facility]: Hadii afsaneh lugo hadasho Soomaali, waaxda luqadaha, qaybta kaalmada adeegyada, drake mcneill . So [facility] [phone].    ATENCIÓN: Si habla español, tiene a worley disposición servicios gratuitos de asistencia lingüística. [facility] [phone].    We comply with applicable federal civil rights laws and [place] laws. We do not discriminate on the basis of race, color, national origin, age, disability, sex, sexual orientation, or gender identity.            Thank you!     Thank you for choosing [facility]  for your care. Our goal is always to provide you with excellent care. Hearing back from our patients is one way we can continue to improve our services. Please take a few minutes to complete the written survey that you may receive in the mail after your visit with us. Thank you!             Your Updated Medication List - Protect others around you: Learn how to safely use, store and throw away your medicines at www.disposemymeds.org.          This list is accurate as  of: 12/7/17  2:41 PM.  Always use your most recent med list.                   Brand Name Dispense Instructions for use Diagnosis    amLODIPine 2.5 MG tablet    NORVASC    90 tablet    Take 1 tablet (2.5 mg) by mouth daily    Coronary artery disease involving native coronary artery of native heart without angina pectoris       aspirin 81 MG tablet      Take 81 mg by mouth 2 times daily        balsalazide 750 MG capsule    COLAZAL     Take 2 tablets twice daily        CALCIUM 1200 PO      Take by mouth daily        calcium citrate + Tabs      Take 2,000 Units by mouth daily        cetirizine 10 MG tablet    zyrTEC    30 tablet    Take 10 mg by mouth daily        cholecalciferol 1000 UNIT tablet    vitamin D3    100 tablet    Take 3,000 Units by mouth daily        isosorbide mononitrate 60 MG 24 hr tablet    IMDUR    90 tablet    Take 1 tablet (60 mg) by mouth daily    Coronary artery disease involving native coronary artery of native heart, angina presence unspecified       melatonin 3 MG tablet      Take 1 tablet (3 mg) by mouth nightly as needed for sleep        METAMUCIL SMOOTH TEXTURE 63 % Powd   Generic drug:  psyllium     1 Bottle    Take 2 teaspoonful by mouth daily Mix in 8 ounces of water        metoprolol 50 MG 24 hr tablet    TOPROL-XL    90 tablet    Take 1 tablet (50 mg) by mouth daily    Coronary artery disease involving native coronary artery of native heart without angina pectoris       MIRALAX powder   Generic drug:  polyethylene glycol     510 g    Take 17 g by mouth daily        misc intestinal len regulat Caps      Take 1 capsule by mouth daily        Multi-vitamin Tabs tablet     100 tablet    Take 1 tablet by mouth daily        nitroGLYcerin 0.4 MG/SPRAY spray    NITROLINGUAL    4.9 g    For chest pain spray 1 spray under tongue every 5 minutes for 3 doses. If symptoms persist 5 minutes after 1st dose call 911.        order for DME     1 each    Equipment being ordered: CPAP and supplies     DARYL (obstructive sleep apnea)       RANEXA 500 MG 12 hr tablet   Generic drug:  ranolazine     180 tablet    TAKE 1 TABLET EVERY 12 HOURS    Coronary artery disease involving native coronary artery of native heart without angina pectoris       rosuvastatin 20 MG tablet    CRESTOR    90 tablet    TAKE 1 TABLET DAILY    Coronary artery disease involving native coronary artery of native heart without angina pectoris       temazepam 30 MG capsule    RESTORIL    90 capsule    Take 1 capsule (30 mg) by mouth nightly as needed for sleep    Insomnia, unspecified type       zolpidem 10 MG tablet    AMBIEN    30 tablet    Take 1 tablet (10 mg) by mouth nightly as needed for sleep    Insomnia, unspecified type

## 2017-12-08 ENCOUNTER — TELEPHONE (OUTPATIENT)
Dept: PEDIATRICS | Facility: CLINIC | Age: 72
End: 2017-12-08

## 2017-12-08 NOTE — TELEPHONE ENCOUNTER
Initiated PA through CoverMyMeds. Awaiting response.    Key: [id number] - PA Case ID: [id number] - Rx #: [id number]     Holly Anderson MA   December 8, 2017,  10:45 AM

## 2017-12-08 NOTE — TELEPHONE ENCOUNTER
Per Dr. Oliver - pt must pay out of pocket for Ambien.   Pharmacy notified.     Holly Anderson MA   December 8, 2017,  3:15 PM

## 2017-12-08 NOTE — TELEPHONE ENCOUNTER
Denial received - alternatives are trazodone or Rozerem.   Routing to provider.     Holly Anderson MA   December 8, 2017,  3:14 PM

## 2017-12-11 DIAGNOSIS — I25.10 CORONARY ARTERY DISEASE INVOLVING NATIVE CORONARY ARTERY OF NATIVE HEART WITHOUT ANGINA PECTORIS: ICD-10-CM

## 2017-12-11 RX ORDER — AMLODIPINE BESYLATE 2.5 MG/1
2.5 TABLET ORAL DAILY
Qty: 90 TABLET | Refills: 1 | Status: SHIPPED | OUTPATIENT
Start: 2017-12-11 | End: 2018-05-08

## 2018-02-20 ENCOUNTER — TRANSFERRED RECORDS (OUTPATIENT)
Dept: HEALTH INFORMATION MANAGEMENT | Facility: CLINIC | Age: 73
End: 2018-02-20

## 2018-02-23 DIAGNOSIS — I25.10 CORONARY ARTERY DISEASE INVOLVING NATIVE CORONARY ARTERY OF NATIVE HEART WITHOUT ANGINA PECTORIS: ICD-10-CM

## 2018-02-23 NOTE — TELEPHONE ENCOUNTER
"Requested Prescriptions   Pending Prescriptions Disp Refills     metoprolol succinate (TOPROL-XL) 50 MG 24 hr tablet [Pharmacy Med Name: METOPROLOL SUCC ER TAB 50MG]  Last Written Prescription Date:  1/26/17  Last Fill Quantity: 90,  # refills: 4   Last office visit: 2/23/2017 with prescribing provider:  2/23/2017     Future Office Visit:   Next 5 appointments (look out 90 days)     Apr 12, 2018 10:45 AM CDT   Return Visit with Charlotte Kay MD   [facility] ([facility])    [address] [place] [id number] [phone]                  90 tablet 4     Sig: TAKE 1 TABLET DAILY    Beta-Blockers Protocol Failed    2/23/2018 10:00 AM       Failed - Recent or future visit with authorizing provider's specialty    Patient had office visit in the last year or has a visit in the next 30 days with authorizing provider.  See \"Patient Info\" tab in inbasket, or \"Choose Columns\" in Meds & Orders section of the refill encounter.            Passed - Blood pressure under 140/90 in past 12 months    BP Readings from Last 3 Encounters:   05/25/17 112/68   04/04/17 124/62   02/23/17 116/80                Passed - Patient is age 6 or older          "

## 2018-02-27 RX ORDER — METOPROLOL SUCCINATE 50 MG/1
TABLET, EXTENDED RELEASE ORAL
Qty: 90 TABLET | Refills: 0 | Status: SHIPPED | OUTPATIENT
Start: 2018-02-27 | End: 2018-05-07

## 2018-04-09 ENCOUNTER — HOSPITAL ENCOUNTER (OUTPATIENT)
Dept: ULTRASOUND IMAGING | Facility: CLINIC | Age: 73
Discharge: HOME | End: 2018-04-09
Attending: INTERNAL MEDICINE | Admitting: INTERNAL MEDICINE
Payer: COMMERCIAL

## 2018-04-09 DIAGNOSIS — I25.10 CORONARY ARTERY DISEASE INVOLVING NATIVE CORONARY ARTERY OF NATIVE HEART WITHOUT ANGINA PECTORIS: ICD-10-CM

## 2018-04-09 PROCEDURE — 93880 EXTRACRANIAL BILAT STUDY: CPT

## 2018-04-12 ENCOUNTER — OFFICE VISIT (OUTPATIENT)
Dept: CARDIOLOGY | Facility: CLINIC | Age: 73
End: 2018-04-12
Attending: INTERNAL MEDICINE
Payer: COMMERCIAL

## 2018-04-12 VITALS
BODY MASS INDEX: 33 KG/M2 | HEART RATE: 72 BPM | SYSTOLIC BLOOD PRESSURE: 124 MMHG | DIASTOLIC BLOOD PRESSURE: 72 MMHG | HEIGHT: 73 IN | WEIGHT: 249 LBS

## 2018-04-12 DIAGNOSIS — I25.10 CORONARY ARTERY DISEASE INVOLVING NATIVE CORONARY ARTERY OF NATIVE HEART WITHOUT ANGINA PECTORIS: ICD-10-CM

## 2018-04-12 PROCEDURE — 99214 OFFICE O/P EST MOD 30 MIN: CPT | Performed by: INTERNAL MEDICINE

## 2018-04-12 NOTE — LETTER
"4/12/2018    Michael Oliver MD  [address] [place] [address]    RE: Hood Arizmendi       Dear Colleague,    I had the pleasure of seeing Hood Arizmendi in the [facility].      Cardiology    I had the pleasure to follow up with your patient, Mr. Hood Arizmendi, in the Cardiovascular Medicine Clinic.  As you recall, he is a very pleasant 72-year-old gentleman who I got acquainted with when he moved to the [place] area.  He has known coronary artery disease, namely small vessel disease.  The posterolateral branch is 100% occluded with moderate disease elsewhere.  His coronary angiogram was performed in 2016, which confirms these findings.         From a cardiac perspective, this gentleman is doing well.  He has not noticed any chest pain, PND, orthopnea, syncope or any other cardiac related concerns.      He is going on a trip to [facility] next week. No return of his cardiac symptoms. A carotid ultrasound is performed and is as noted below. He did have some issues with his GI system. He has been prescribed medical cannabis and states that this has helped considerably. Otherwise no changes in his current regimen.     Carotid US:  IMPRESSION:    1. Less than 50% diameter stenosis of the right ICA relative to the  distal ICA diameter. No change since the previous study.  2. Less than 50% diameter stenosis of the left ICA relative to the  distal ICA diameter. No change since the previous study.      PHYSICAL EXAMINATION:   VITAL SIGNS:  Blood pressure 124/72, pulse 72, height 1.842 m (6' 0.5\"), weight 112.9 kg (249 lb).  GENERAL:  The patient is alert, oriented, no apparent distress.   HEENT:  Oropharynx clear, no sinus tenderness.   NECK:  No JVP.  No carotid bruits.   CARDIOVASCULAR:  Distant heart tones.  Normal S1, S2.  No murmurs, gallops or rubs.   LUNGS:  Coarse but clear.   ABDOMEN:  Soft, nontender, nondistended.   EXTREMITIES:  No clubbing, cyanosis or edema.    "   ASSESSMENT:   1.  Chest discomfort with known single-vessel occlusive coronary artery disease, namely ostial posterolateral branch with ipsilateral collateral filling by angiogram in 2016.  Resolved  2.  Mild disease elsewhere.   3.  Normal LV systolic function.   4.  Mild carotid artery stenosis.   5.  Eye surgery    6.  Hypertension.   7.  Hyperlipidemia.      RECOMMENDATIONS:   1.  Atherosclerotic coronary artery disease, stable.  He appears to be on appropriate guideline-directed medical therapy.  Let us continue with his stable cardiac regimen.  No return of symptoms.   2.  Peripheral arterial disease. Stable carotid ultrasound   3.  Hypertension, stable.   4.  Hyperlipidemia.  Let us continue with statin therapy.  He is due for cholesterol check this year.  I will leave this in your capable hands.   5.  We will have him return to clinic in 1 year's time       Charlotte Kay MD            Thank you for allowing me to participate in the care of your patient.      Sincerely,     Charlotte Kay MD     [facility] [place] [facility]    cc:   Charlotte Kay MD  [address] [facility] [address]  [place] [address]

## 2018-04-12 NOTE — MR AVS SNAPSHOT
"              After Visit Summary   4/12/2018    Hood DEVINE Page    MRN: [id number]           Patient Information     Date Of Birth          1945        Visit Information        Provider Department      4/12/2018 10:45 AM Charlotte Kay MD [facility]        Today's Diagnoses     Coronary artery disease involving native coronary artery of native heart without angina pectoris           Follow-ups after your visit        Additional Services     Follow-Up with Cardiologist                 Who to contact     If you have questions or need follow up information about today's clinic visit or your schedule please contact [facility] directly at [phone].  Normal or non-critical lab and imaging results will be communicated to you by MyChart, letter or phone within 4 business days after the clinic has received the results. If you do not hear from us within 7 days, please contact the clinic through Abakushart or phone. If you have a critical or abnormal lab result, we will notify you by phone as soon as possible.  Submit refill requests through [employer]form or call your pharmacy and they will forward the refill request to us. Please allow 3 business days for your refill to be completed.          Additional Information About Your Visit        MyChart Information     [employer]form gives you secure access to your electronic health record. If you see a primary care provider, you can also send messages to your care team and make appointments. If you have questions, please call your primary care clinic.  If you do not have a primary care provider, please call [phone] and they will assist you.        Care EveryWhere ID     This is your Care EveryWhere ID. This could be used by other organizations to access your [place] medical records  [id number]        Your Vitals Were     Pulse Height BMI (Body Mass Index)             72 1.842 m (6' 0.5\") 33.31 " kg/m2          Blood Pressure from Last 3 Encounters:   04/12/18 124/72   05/25/17 112/68   04/04/17 124/62    Weight from Last 3 Encounters:   04/12/18 112.9 kg (249 lb)   05/25/17 114.3 kg (252 lb)   04/04/17 110.7 kg (244 lb)              We Performed the Following     Follow-Up with Cardiologist        Primary Care Provider Office Phone # Fax #    Michael Oliver [phone] [address] [place] [address]        Equal Access to Services     [facility]: Hadii aad ku hadasho Soomaali, waaxda luqadaha, qaybta kaalmada adeegyajonathan, drake mcneill . So [facility] [phone].    ATENCIÓN: Si habla español, tiene a worley disposición servicios gratuitos de asistencia lingüística. [facility] [phone].    We comply with applicable federal civil rights laws and [place] laws. We do not discriminate on the basis of race, color, national origin, age, disability, sex, sexual orientation, or gender identity.            Thank you!     Thank you for choosing [facility]  for your care. Our goal is always to provide you with excellent care. Hearing back from our patients is one way we can continue to improve our services. Please take a few minutes to complete the written survey that you may receive in the mail after your visit with us. Thank you!             Your Updated Medication List - Protect others around you: Learn how to safely use, store and throw away your medicines at www.disposemymeds.org.          This list is accurate as of 4/12/18 11:59 PM.  Always use your most recent med list.                   Brand Name Dispense Instructions for use Diagnosis    amLODIPine 2.5 MG tablet    NORVASC    90 tablet    Take 1 tablet (2.5 mg) by mouth daily    Coronary artery disease involving native coronary artery of native heart without angina pectoris       aspirin 81 MG tablet      Take 81 mg by mouth 2 times daily        balsalazide  750 MG capsule    COLAZAL     Take 2 tablets twice daily        CALCIUM 1200 PO      Take by mouth daily        calcium citrate + Tabs      Take 2,000 Units by mouth daily        cetirizine 10 MG tablet    zyrTEC    30 tablet    Take 10 mg by mouth daily        cholecalciferol 1000 UNIT tablet    vitamin D3    100 tablet    Take 3,000 Units by mouth daily        isosorbide mononitrate 60 MG 24 hr tablet    IMDUR    90 tablet    Take 1 tablet (60 mg) by mouth daily    Coronary artery disease involving native coronary artery of native heart, angina presence unspecified       medical cannabis liquid      (This is NOT a prescription, and does not certify that the patient has a qualifying medical condition for medical cannabis.  The purpose of this order is  to document that the patient reports taking medical cannabis.)        melatonin 3 MG tablet      Take 1 tablet (3 mg) by mouth nightly as needed for sleep        METAMUCIL SMOOTH TEXTURE 63 % Powd   Generic drug:  psyllium     1 Bottle    Take 2 teaspoonful by mouth daily Mix in 8 ounces of water        metoprolol succinate 50 MG 24 hr tablet    TOPROL-XL    90 tablet    TAKE 1 TABLET DAILY    Coronary artery disease involving native coronary artery of native heart without angina pectoris       MIRALAX powder   Generic drug:  polyethylene glycol     510 g    Take 17 g by mouth daily        misc intestinal len regulat Caps      Take 1 capsule by mouth daily        Multi-vitamin Tabs tablet     100 tablet    Take 1 tablet by mouth daily        nitroGLYcerin 0.4 MG/SPRAY spray    NITROLINGUAL    4.9 g    For chest pain spray 1 spray under tongue every 5 minutes for 3 doses. If symptoms persist 5 minutes after 1st dose call 911.        order for DME     1 each    Equipment being ordered: CPAP and supplies    DARYL (obstructive sleep apnea)       RANEXA 500 MG 12 hr tablet   Generic drug:  ranolazine     180 tablet    TAKE 1 TABLET EVERY 12 HOURS    Coronary artery  disease involving native coronary artery of native heart without angina pectoris       rosuvastatin 20 MG tablet    CRESTOR    90 tablet    TAKE 1 TABLET DAILY    Coronary artery disease involving native coronary artery of native heart without angina pectoris       zolpidem 10 MG tablet    AMBIEN    30 tablet    Take 1 tablet (10 mg) by mouth nightly as needed for sleep    Insomnia, unspecified type

## 2018-04-12 NOTE — LETTER
"4/12/2018    Michael Oliver MD  [address] [place] [address]    RE: Hood Arizmendi       Dear Colleague,    I had the pleasure of seeing Hood Arizmendi in the [facility].      Cardiology    I had the pleasure to follow up with your patient, Mr. Hood Arizmendi, in the Cardiovascular Medicine Clinic.  As you recall, he is a very pleasant 72-year-old gentleman who I got acquainted with when he moved to the [place] area.  He has known coronary artery disease, namely small vessel disease.  The posterolateral branch is 100% occluded with moderate disease elsewhere.  His coronary angiogram was performed in 2016, which confirms these findings.         From a cardiac perspective, this gentleman is doing well.  He has not noticed any chest pain, PND, orthopnea, syncope or any other cardiac related concerns.      He is going on a trip to [facility] next week. No return of his cardiac symptoms. A carotid ultrasound is performed and is as noted below. He did have some issues with his GI system. He has been prescribed medical cannabis and states that this has helped considerably. Otherwise no changes in his current regimen.     Carotid US:  IMPRESSION:    1. Less than 50% diameter stenosis of the right ICA relative to the  distal ICA diameter. No change since the previous study.  2. Less than 50% diameter stenosis of the left ICA relative to the  distal ICA diameter. No change since the previous study.      PHYSICAL EXAMINATION:   VITAL SIGNS:  Blood pressure 124/72, pulse 72, height 1.842 m (6' 0.5\"), weight 112.9 kg (249 lb).  GENERAL:  The patient is alert, oriented, no apparent distress.   HEENT:  Oropharynx clear, no sinus tenderness.   NECK:  No JVP.  No carotid bruits.   CARDIOVASCULAR:  Distant heart tones.  Normal S1, S2.  No murmurs, gallops or rubs.   LUNGS:  Coarse but clear.   ABDOMEN:  Soft, nontender, nondistended.   EXTREMITIES:  No clubbing, cyanosis or edema.    "   ASSESSMENT:   1.  Chest discomfort with known single-vessel occlusive coronary artery disease, namely ostial posterolateral branch with ipsilateral collateral filling by angiogram in 2016.  Resolved  2.  Mild disease elsewhere.   3.  Normal LV systolic function.   4.  Mild carotid artery stenosis.   5.  Eye surgery    6.  Hypertension.   7.  Hyperlipidemia.      RECOMMENDATIONS:   1.  Atherosclerotic coronary artery disease, stable.  He appears to be on appropriate guideline-directed medical therapy.  Let us continue with his stable cardiac regimen.  No return of symptoms.   2.  Peripheral arterial disease. Stable carotid ultrasound   3.  Hypertension, stable.   4.  Hyperlipidemia.  Let us continue with statin therapy.  He is due for cholesterol check this year.  I will leave this in your capable hands.   5.  We will have him return to clinic in 1 year's time       Thank you for allowing me to participate in the care of your patient.    Sincerely,     Charlotte Kay MD     [facility]

## 2018-04-12 NOTE — PROGRESS NOTES
"  Cardiology    I had the pleasure to follow up with your patient, Mr. Hood Arizmendi, in the Cardiovascular Medicine Clinic.  As you recall, he is a very pleasant 72-year-old gentleman who I got acquainted with when he moved to the [place] area.  He has known coronary artery disease, namely small vessel disease.  The posterolateral branch is 100% occluded with moderate disease elsewhere.  His coronary angiogram was performed in 2016, which confirms these findings.         From a cardiac perspective, this gentleman is doing well.  He has not noticed any chest pain, PND, orthopnea, syncope or any other cardiac related concerns.      He is going on a trip to [facility] next week. No return of his cardiac symptoms. A carotid ultrasound is performed and is as noted below. He did have some issues with his GI system. He has been prescribed medical cannabis and states that this has helped considerably. Otherwise no changes in his current regimen.     Carotid US:  IMPRESSION:    1. Less than 50% diameter stenosis of the right ICA relative to the  distal ICA diameter. No change since the previous study.  2. Less than 50% diameter stenosis of the left ICA relative to the  distal ICA diameter. No change since the previous study.      PHYSICAL EXAMINATION:   VITAL SIGNS:  Blood pressure 124/72, pulse 72, height 1.842 m (6' 0.5\"), weight 112.9 kg (249 lb).  GENERAL:  The patient is alert, oriented, no apparent distress.   HEENT:  Oropharynx clear, no sinus tenderness.   NECK:  No JVP.  No carotid bruits.   CARDIOVASCULAR:  Distant heart tones.  Normal S1, S2.  No murmurs, gallops or rubs.   LUNGS:  Coarse but clear.   ABDOMEN:  Soft, nontender, nondistended.   EXTREMITIES:  No clubbing, cyanosis or edema.      ASSESSMENT:   1.  Chest discomfort with known single-vessel occlusive coronary artery disease, namely ostial posterolateral branch with ipsilateral collateral filling by angiogram in 2016.  Resolved  2.  Mild disease " elsewhere.   3.  Normal LV systolic function.   4.  Mild carotid artery stenosis.   5.  Eye surgery    6.  Hypertension.   7.  Hyperlipidemia.      RECOMMENDATIONS:   1.  Atherosclerotic coronary artery disease, stable.  He appears to be on appropriate guideline-directed medical therapy.  Let us continue with his stable cardiac regimen.  No return of symptoms.   2.  Peripheral arterial disease. Stable carotid ultrasound   3.  Hypertension, stable.   4.  Hyperlipidemia.  Let us continue with statin therapy.  He is due for cholesterol check this year.  I will leave this in your capable hands.   5.  We will have him return to clinic in 1 year's time       Charlotte Kay MD

## 2018-05-07 ENCOUNTER — TELEPHONE (OUTPATIENT)
Dept: PEDIATRICS | Facility: CLINIC | Age: 73
End: 2018-05-07

## 2018-05-07 DIAGNOSIS — I25.10 CORONARY ARTERY DISEASE INVOLVING NATIVE CORONARY ARTERY OF NATIVE HEART WITHOUT ANGINA PECTORIS: ICD-10-CM

## 2018-05-07 NOTE — TELEPHONE ENCOUNTER
"Requested Prescriptions   Pending Prescriptions Disp Refills     metoprolol succinate (TOPROL-XL) 50 MG 24 hr tablet [Pharmacy Med Name: METOPROLOL SUCC ER TAB 50MG]    Last Written Prescription Date:  2/27/2018  Last Fill Quantity: 90,  # refills: 0   Last office visit: 2/23/2017 with prescribing provider:  Michael Oliver     Future Office Visit:     90 tablet 0     Sig: TAKE 1 TABLET DAILY    Beta-Blockers Protocol Failed    5/7/2018  2:41 PM       Failed - Recent (12 mo) or future (30 days) visit within the authorizing provider's specialty    Patient had office visit in the last 12 months or has a visit in the next 30 days with authorizing provider or within the authorizing provider's specialty.  See \"Patient Info\" tab in inbasket, or \"Choose Columns\" in Meds & Orders section of the refill encounter.           Passed - Blood pressure under 140/90 in past 12 months    BP Readings from Last 3 Encounters:   04/12/18 124/72   05/25/17 112/68   04/04/17 124/62                Passed - Patient is age 6 or older        rosuvastatin (CRESTOR) 20 MG tablet [Pharmacy Med Name: ROSUVASTATIN TABS 20MG]    Not due for a refill. Was filled on 8/2/2017, qty 90 with 3 refills.  90 tablet 3     Sig: TAKE 1 TABLET DAILY    Statins Protocol Failed    5/7/2018  2:41 PM       Failed - Recent (12 mo) or future (30 days) visit within the authorizing provider's specialty    Patient had office visit in the last 12 months or has a visit in the next 30 days with authorizing provider or within the authorizing provider's specialty.  See \"Patient Info\" tab in inbasket, or \"Choose Columns\" in Meds & Orders section of the refill encounter.           Passed - LDL on file in past 12 months    Recent Labs   Lab Test  08/08/17   0836   LDL  58            Passed - No abnormal creatine kinase in past 12 months    No lab results found.            Passed - Patient is age 18 or older          "

## 2018-05-07 NOTE — LETTER
May 10, 2018      Hood DEVINE Page  [address]  [place] [address]        Dear Hood,       We care about your health and have reviewed your health plan including your medical conditions, medications, and lab results.  Based on this review, it is recommended that you follow up regarding the following health topic(s):  -Wellness (Physical) Visit     We recommend you take the following action(s):  -schedule a FOLLOWUP APPOINTMENT. (Annual Physical)     Please call us at the [facility] - [phone] (or use [employer]) to address the above recommendations.     Thank you for trusting [facility] and we appreciate the opportunity to serve you.  We look forward to supporting your healthcare needs in the future.    Healthy Regards,        Michael Oliver MD

## 2018-05-08 DIAGNOSIS — I25.10 CORONARY ARTERY DISEASE INVOLVING NATIVE CORONARY ARTERY OF NATIVE HEART, ANGINA PRESENCE UNSPECIFIED: ICD-10-CM

## 2018-05-08 DIAGNOSIS — I25.10 CORONARY ARTERY DISEASE INVOLVING NATIVE CORONARY ARTERY OF NATIVE HEART WITHOUT ANGINA PECTORIS: ICD-10-CM

## 2018-05-08 RX ORDER — ISOSORBIDE MONONITRATE 60 MG/1
60 TABLET, EXTENDED RELEASE ORAL DAILY
Qty: 90 TABLET | Refills: 1 | Status: SHIPPED | OUTPATIENT
Start: 2018-05-08 | End: 2018-12-26

## 2018-05-08 RX ORDER — AMLODIPINE BESYLATE 2.5 MG/1
2.5 TABLET ORAL DAILY
Qty: 90 TABLET | Refills: 1 | Status: SHIPPED | OUTPATIENT
Start: 2018-05-08 | End: 2018-12-26

## 2018-05-10 RX ORDER — METOPROLOL SUCCINATE 50 MG/1
TABLET, EXTENDED RELEASE ORAL
Qty: 90 TABLET | Refills: 3 | Status: SHIPPED | OUTPATIENT
Start: 2018-05-10 | End: 2019-07-22

## 2018-05-10 RX ORDER — ROSUVASTATIN CALCIUM 20 MG/1
TABLET, COATED ORAL
Qty: 90 TABLET | Refills: 0 | Status: SHIPPED | OUTPATIENT
Start: 2018-05-10 | End: 2018-05-15

## 2018-05-10 NOTE — TELEPHONE ENCOUNTER
Metoprolol  -per cardiology note    Prescription approved per [facility] Refill Protocol.    Kassie MAO RN, BSN, PHN  Lester Flex RN

## 2018-05-10 NOTE — TELEPHONE ENCOUNTER
Please assist patient with scheduling an office visit for an annual physical.    Last px:7/15/2016    Thank you    Kassie MAO RN, BSN, PHN  [facility]

## 2018-05-10 NOTE — TELEPHONE ENCOUNTER
Pt's phone number without VM box. Sent [employer] message and letter to pt's home.     Holly Anderson MA   May 10, 2018,  2:55 PM

## 2018-05-15 ENCOUNTER — OFFICE VISIT (OUTPATIENT)
Dept: PEDIATRICS | Facility: CLINIC | Age: 73
End: 2018-05-15
Payer: COMMERCIAL

## 2018-05-15 VITALS
HEART RATE: 60 BPM | TEMPERATURE: 97.7 F | SYSTOLIC BLOOD PRESSURE: 120 MMHG | OXYGEN SATURATION: 95 % | BODY MASS INDEX: 33 KG/M2 | HEIGHT: 73 IN | DIASTOLIC BLOOD PRESSURE: 68 MMHG | WEIGHT: 249 LBS

## 2018-05-15 DIAGNOSIS — I25.10 CORONARY ARTERY DISEASE INVOLVING NATIVE CORONARY ARTERY OF NATIVE HEART WITHOUT ANGINA PECTORIS: ICD-10-CM

## 2018-05-15 DIAGNOSIS — Z12.5 SPECIAL SCREENING FOR MALIGNANT NEOPLASM OF PROSTATE: ICD-10-CM

## 2018-05-15 DIAGNOSIS — Z23 NEED FOR TETANUS BOOSTER: ICD-10-CM

## 2018-05-15 DIAGNOSIS — G47.00 INSOMNIA, UNSPECIFIED TYPE: ICD-10-CM

## 2018-05-15 DIAGNOSIS — N20.0 KIDNEY STONES: ICD-10-CM

## 2018-05-15 DIAGNOSIS — R97.20 ELEVATED PROSTATE SPECIFIC ANTIGEN (PSA): ICD-10-CM

## 2018-05-15 DIAGNOSIS — Z00.00 ENCOUNTER FOR ROUTINE ADULT HEALTH EXAMINATION WITHOUT ABNORMAL FINDINGS: Primary | ICD-10-CM

## 2018-05-15 LAB — PSA SERPL-ACNC: 9.49 UG/L (ref 0–4)

## 2018-05-15 PROCEDURE — 80053 COMPREHEN METABOLIC PANEL: CPT | Performed by: INTERNAL MEDICINE

## 2018-05-15 PROCEDURE — 36415 COLL VENOUS BLD VENIPUNCTURE: CPT | Performed by: INTERNAL MEDICINE

## 2018-05-15 PROCEDURE — G0103 PSA SCREENING: HCPCS | Performed by: INTERNAL MEDICINE

## 2018-05-15 PROCEDURE — 80061 LIPID PANEL: CPT | Performed by: INTERNAL MEDICINE

## 2018-05-15 PROCEDURE — G0439 PPPS, SUBSEQ VISIT: HCPCS | Performed by: INTERNAL MEDICINE

## 2018-05-15 PROCEDURE — 90471 IMMUNIZATION ADMIN: CPT | Performed by: INTERNAL MEDICINE

## 2018-05-15 PROCEDURE — 90715 TDAP VACCINE 7 YRS/> IM: CPT | Performed by: INTERNAL MEDICINE

## 2018-05-15 RX ORDER — HYDROCODONE BITARTRATE AND ACETAMINOPHEN 5; 325 MG/1; MG/1
1 TABLET ORAL EVERY 6 HOURS PRN
Qty: 10 TABLET | Refills: 0 | Status: SHIPPED | OUTPATIENT
Start: 2018-05-15 | End: 2019-09-20

## 2018-05-15 RX ORDER — ROSUVASTATIN CALCIUM 20 MG/1
20 TABLET, COATED ORAL DAILY
Qty: 90 TABLET | Refills: 0 | Status: SHIPPED | OUTPATIENT
Start: 2018-05-15 | End: 2018-10-01

## 2018-05-15 RX ORDER — TEMAZEPAM 30 MG
30 CAPSULE ORAL
Qty: 15 CAPSULE | Refills: 0 | Status: SHIPPED | OUTPATIENT
Start: 2018-05-15 | End: 2019-09-20

## 2018-05-15 ASSESSMENT — ENCOUNTER SYMPTOMS
DIARRHEA: 0
PARESTHESIAS: 0
ABDOMINAL PAIN: 0
SORE THROAT: 0
EYE PAIN: 0
JOINT SWELLING: 0
HEARTBURN: 0
DIZZINESS: 0
MYALGIAS: 0
PALPITATIONS: 0
NAUSEA: 0
NERVOUS/ANXIOUS: 0
SHORTNESS OF BREATH: 0
DYSURIA: 0
CHILLS: 0
FEVER: 0
CONSTIPATION: 0
HEADACHES: 0
ARTHRALGIAS: 0
HEMATOCHEZIA: 0
COUGH: 0
FREQUENCY: 0
WEAKNESS: 0
HEMATURIA: 0

## 2018-05-15 ASSESSMENT — ACTIVITIES OF DAILY LIVING (ADL)
I_NEED_ASSISTANCE_FOR_THE_FOLLOWING_DAILY_ACTIVITIES:: NO ASSISTANCE IS NEEDED
CURRENT_FUNCTION: NO ASSISTANCE NEEDED

## 2018-05-15 NOTE — NURSING NOTE
Screening Questionnaire for Adult Immunization    Are you sick today?   No   Do you have allergies to medications, food, a vaccine component or latex?   No   Have you ever had a serious reaction after receiving a vaccination?   No   Do you have a long-term health problem with heart disease, lung disease, asthma, kidney disease, metabolic disease (e.g. diabetes), anemia, or other blood disorder?   No   Do you have cancer, leukemia, HIV/AIDS, or any other immune system problem?   No   In the past 3 months, have you taken medications that affect  your immune system, such as prednisone, other steroids, or anticancer drugs; drugs for the treatment of rheumatoid arthritis, Crohn s disease, or psoriasis; or have you had radiation treatments?   No   Have you had a seizure, or a brain or other nervous system problem?   No   During the past year, have you received a transfusion of blood or blood     products, or been given immune (gamma) globulin or antiviral drug?   No   For women: Are you pregnant or is there a chance you could become        pregnant during the next month?   No   Have you received any vaccinations in the past 4 weeks?   No     Immunization questionnaire answers were all negative.        Per orders of Dr. Oliver, injection of Adacel given by Holly Anderson. Patient instructed to remain in clinic for 15 minutes afterwards, and to report any adverse reaction to me immediately.       Screening performed by Holly Anderson on 5/15/2018 at 10:57 AM.

## 2018-05-15 NOTE — MR AVS SNAPSHOT
"              After Visit Summary   5/15/2018    Hood DEVINE Page    MRN: [id number]           Patient Information     Date Of Birth          1945        Visit Information        Provider Department      5/15/2018 10:00 AM Michael Oliver MD [facility]        Today's Diagnoses     Special screening for malignant neoplasm of prostate    -  1    Encounter for routine adult health examination without abnormal findings        Kidney stones        Insomnia, unspecified type        Coronary artery disease involving native coronary artery of native heart without angina pectoris          Care Instructions      Lab work downstairs today.  Directions:  As you walk through the first floor, you'll see (on the right) first the pharmacy, then some bathrooms, then the \"Lab and Imaging\" area. Give them your name at the window there and wait for them to call you.     Get your shingles vaccine (\"Shingrix\") at our pharmacy downstairs (or at another pharmacy), sometime this year--even if you've already received the old \"Zostavax\" shingles vaccine.  The \"Shingrix\" has better immunity and lasts longer, and is cheaper if you get it directly at a pharmacy.  You should not need an appointment.     You will need a second Shingrix anywhere from 2-6 months later.    Follow up in 6 months for blood pressure recheck, insomnia recheck, etc.    Michael Oliver MD  Internal Medicine and Pediatrics     Preventive Health Recommendations:       Male Ages 65 and over    Yearly exam:             See your health care provider every year in order to  o   Review health changes.   o   Discuss preventive care.    o   Review your medicines if your doctor has prescribed any.    Talk with your health care provider about whether you should have a test to screen for prostate cancer (PSA).    Every 3 years, have a diabetes test (fasting glucose). If you are at risk for diabetes, you should have this test more often.    Every 5 years, have a cholesterol " test. Have this test more often if you are at risk for high cholesterol or heart disease.     Every 10 years, have a colonoscopy. Or, have a yearly FIT test (stool test). These exams will check for colon cancer.    Talk to with your health care provider about screening for Abdominal Aortic Aneurysm if you have a family history of AAA or have a history of smoking.  Shots:     Get a flu shot each year.     Get a tetanus shot every 10 years.     Talk to your doctor about your pneumonia vaccines. There are now two you should receive - Pneumovax (PPSV 23) and Prevnar (PCV 13).    Talk to your doctor about a shingles vaccine.     Talk to your doctor about the hepatitis B vaccine.  Nutrition:     Eat at least 5 servings of fruits and vegetables each day.     Eat whole-grain bread, whole-wheat pasta and brown rice instead of white grains and rice.     Talk to your doctor about Calcium and Vitamin D.   Lifestyle    Exercise for at least 150 minutes a week (30 minutes a day, 5 days a week). This will help you control your weight and prevent disease.     Limit alcohol to one drink per day.     No smoking.     Wear sunscreen to prevent skin cancer.     See your dentist every six months for an exam and cleaning.     See your eye doctor every 1 to 2 years to screen for conditions such as glaucoma, macular degeneration and cataracts.          Follow-ups after your visit        Follow-up notes from your care team     Return in about 6 months (around 11/15/2018) for Medical Check Up.      Who to contact     If you have questions or need follow up information about today's clinic visit or your schedule please contact [facility] directly at [phone].  Normal or non-critical lab and imaging results will be communicated to you by MyChart, letter or phone within 4 business days after the clinic has received the results. If you do not hear from us within 7 days, please contact the clinic through MyChart or phone. If you  "have a critical or abnormal lab result, we will notify you by phone as soon as possible.  Submit refill requests through [employer] or call your pharmacy and they will forward the refill request to us. Please allow 3 business days for your refill to be completed.          Additional Information About Your Visit        WP Enginehart Information     [employer] gives you secure access to your electronic health record. If you see a primary care provider, you can also send messages to your care team and make appointments. If you have questions, please call your primary care clinic.  If you do not have a primary care provider, please call [phone] and they will assist you.        Care EveryWhere ID     This is your Care EveryWhere ID. This could be used by other organizations to access your [place] medical records  [id number]        Your Vitals Were     Pulse Temperature Height Pulse Oximetry BMI (Body Mass Index)       60 97.7  F (36.5  C) (Oral) 6' 0.5\" (1.842 m) 95% 33.31 kg/m2        Blood Pressure from Last 3 Encounters:   05/15/18 120/68   04/12/18 124/72   05/25/17 112/68    Weight from Last 3 Encounters:   05/15/18 249 lb (112.9 kg)   04/12/18 249 lb (112.9 kg)   05/25/17 252 lb (114.3 kg)              We Performed the Following     Comprehensive metabolic panel     Lipid panel reflex to direct LDL Fasting     PSA, screen     TDAP VACCINE (ADACEL)          Today's Medication Changes          These changes are accurate as of 5/15/18 10:34 AM.  If you have any questions, ask your nurse or doctor.               Start taking these medicines.        Dose/Directions    HYDROcodone-acetaminophen 5-325 MG per tablet   Commonly known as:  NORCO   Used for:  Kidney stones   Started by:  Michael Oliver MD        Dose:  1 tablet   Take 1 tablet by mouth every 6 hours as needed for severe pain   Quantity:  10 tablet   Refills:  0       temazepam 30 MG capsule   Commonly known as:  RESTORIL   Used for:  Insomnia, unspecified type "   Started by:  Michael Oliver MD        Dose:  30 mg   Take 1 capsule (30 mg) by mouth nightly as needed for sleep   Quantity:  15 capsule   Refills:  0         These medicines have changed or have updated prescriptions.        Dose/Directions    rosuvastatin 20 MG tablet   Commonly known as:  CRESTOR   This may have changed:  See the new instructions.   Used for:  Coronary artery disease involving native coronary artery of native heart without angina pectoris   Changed by:  Michael Oliver MD        Dose:  20 mg   Take 1 tablet (20 mg) by mouth daily   Quantity:  90 tablet   Refills:  0            Where to get your medicines      These medications were sent to [facility] - [place], [place] - [address] [place] [address]  [address] [address]     Phone:  [phone]     rosuvastatin 20 MG tablet         Some of these will need a paper prescription and others can be bought over the counter.  Ask your nurse if you have questions.     Bring a paper prescription for each of these medications     HYDROcodone-acetaminophen 5-325 MG per tablet    temazepam 30 MG capsule               Information about OPIOIDS     PRESCRIPTION OPIOIDS: WHAT YOU NEED TO KNOW   You have a prescription for an opioid (narcotic) pain medicine. Opioids can cause addiction. If you have a history of chemical dependency of any type, you are at a higher risk of becoming addicted to opioids. Only take this medicine after all other options have been tried. Take it for as short a time and as few doses as possible.     Do not:    Drive. If you drive while taking these medicines, you could be arrested for driving under the influence (DUI).    Operate heavy machinery    Do any other dangerous activities while taking these medicines.     Drink any alcohol while taking these medicines.      Take with any other medicines that contain acetaminophen. Read all labels carefully. Look for the word  acetaminophen  or  Tylenol.  Ask  your pharmacist if you have questions or are unsure.    Store your pills in a secure place, locked if possible. We will not replace any lost or stolen medicine. If you don t finish your medicine, please throw away (dispose) as directed by your pharmacist. The [place] Pollution Control Agency has more information about safe disposal: https://www.pca.[facility].[place].us/living-green/managing-unwanted-medications    All opioids tend to cause constipation. Drink plenty of water and eat foods that have a lot of fiber, such as fruits, vegetables, prune juice, apple juice and high-fiber cereal. Take a laxative (Miralax, milk of magnesia, Colace, Senna) if you don t move your bowels at least every other day.          Primary Care Provider Office Phone # Fax #    Michael Oliver [phone] [address] [place] [address]        Equal Access to Services     [facility]: Hadii afsaneh lugo hadasho Somaria isabel, waaxda luqadaha, qaybta kaalmada adeegyada, drake mcneill . So [facility] [phone].    ATENCIÓN: Si habla español, tiene a worley disposición servicios gratuitos de asistencia lingüística. Llame al [phone].    We comply with applicable federal civil rights laws and [place] laws. We do not discriminate on the basis of race, color, national origin, age, disability, sex, sexual orientation, or gender identity.            Thank you!     Thank you for choosing [facility]  for your care. Our goal is always to provide you with excellent care. Hearing back from our patients is one way we can continue to improve our services. Please take a few minutes to complete the written survey that you may receive in the mail after your visit with us. Thank you!             Your Updated Medication List - Protect others around you: Learn how to safely use, store and throw away your medicines at www.disposemymeds.org.          This list is accurate as of 5/15/18 10:34 AM.  Always  use your most recent med list.                   Brand Name Dispense Instructions for use Diagnosis    amLODIPine 2.5 MG tablet    NORVASC    90 tablet    Take 1 tablet (2.5 mg) by mouth daily    Coronary artery disease involving native coronary artery of native heart without angina pectoris       aspirin 81 MG tablet      Take 81 mg by mouth 2 times daily        balsalazide 750 MG capsule    COLAZAL     Take 2 tablets twice daily        cetirizine 10 MG tablet    zyrTEC    30 tablet    Take 10 mg by mouth daily        HYDROcodone-acetaminophen 5-325 MG per tablet    NORCO    10 tablet    Take 1 tablet by mouth every 6 hours as needed for severe pain    Kidney stones       isosorbide mononitrate 60 MG 24 hr tablet    IMDUR    90 tablet    Take 1 tablet (60 mg) by mouth daily    Coronary artery disease involving native coronary artery of native heart, angina presence unspecified       medical cannabis liquid      (This is NOT a prescription, and does not certify that the patient has a qualifying medical condition for medical cannabis.  The purpose of this order is  to document that the patient reports taking medical cannabis.)        melatonin 3 MG tablet      Take 1 tablet (3 mg) by mouth nightly as needed for sleep        METAMUCIL SMOOTH TEXTURE 63 % Powd   Generic drug:  psyllium     1 Bottle    Take 2 teaspoonful by mouth daily Mix in 8 ounces of water        metoprolol succinate 50 MG 24 hr tablet    TOPROL-XL    90 tablet    TAKE 1 TABLET DAILY    Coronary artery disease involving native coronary artery of native heart without angina pectoris       MIRALAX powder   Generic drug:  polyethylene glycol     510 g    Take 17 g by mouth daily        nitroGLYcerin 0.4 MG/SPRAY spray    NITROLINGUAL    4.9 g    For chest pain spray 1 spray under tongue every 5 minutes for 3 doses. If symptoms persist 5 minutes after 1st dose call 911.        order for DME     1 each    Equipment being ordered: CPAP and supplies    DARYL  (obstructive sleep apnea)       RANEXA 500 MG 12 hr tablet   Generic drug:  ranolazine     180 tablet    TAKE 1 TABLET EVERY 12 HOURS    Coronary artery disease involving native coronary artery of native heart without angina pectoris       rosuvastatin 20 MG tablet    CRESTOR    90 tablet    Take 1 tablet (20 mg) by mouth daily    Coronary artery disease involving native coronary artery of native heart without angina pectoris       temazepam 30 MG capsule    RESTORIL    15 capsule    Take 1 capsule (30 mg) by mouth nightly as needed for sleep    Insomnia, unspecified type

## 2018-05-15 NOTE — PROGRESS NOTES
SUBJECTIVE:   Hood Arizmendi is a 73 year old male who presents for Preventive Visit.    Are you in the first 12 months of your Medicare coverage?  No    Physical   Annual:     Getting at least 3 servings of Calcium per day::  NO    Bi-annual eye exam::  Yes    Dental care twice a year::  Yes    Sleep apnea or symptoms of sleep apnea::  Sleep apnea    Diet::  Low fat/cholesterol    Frequency of exercise::  4-5 days/week    Duration of exercise::  30-45 minutes    Taking medications regularly::  Yes    Medication side effects::  None    Additional concerns today::  YES    Ability to successfully perform activities of daily living: no assistance needed  Home Safety:  Throw rugs in the hallway and lack of grab bars in the bathroom  Hearing Impairment: difficulty following a conversation in a noisy restaurant or crowded room      Fall risk:  Fallen 2 or more times in the past year?: No  Any fall with injury in the past year?: No      COGNITIVE SCREEN  1) Repeat 3 items (Banana, Sunrise, Chair)    2) Clock draw: NORMAL  3) 3 item recall: Recalls 3 objects  Results: 3 items recalled: COGNITIVE IMPAIRMENT LESS LIKELY    Mini-CogTM Copyright S My. Licensed by the author for use in Health system; reprinted with permission (soob@[email or web address]). All rights reserved.          Reviewed and updated as needed this visit by clinical staff    Reviewed and updated as needed this visit by Provider      Social History   Substance Use Topics     Smoking status: Never Smoker     Smokeless tobacco: Never Used     Alcohol use 4.2 oz/week     7 Standard drinks or equivalent per week      Comment: 1 per day         Today's PHQ-2 Score:   PHQ-2 ( 1999 Pfizer) 5/15/2018   Q1: Little interest or pleasure in doing things 0   Q2: Feeling down, depressed or hopeless 0   PHQ-2 Score 0   Q1: Little interest or pleasure in doing things Not at all   Q2: Feeling down, depressed or hopeless Not at all   PHQ-2 Score 0       Do you feel safe in  your environment - Yes    Do you have a Health Care Directive?: Yes: Advance Directive has been received and scanned.    Current providers sharing in care for this patient include:   Patient Care Team:  Michael Oliver MD as PCP - General (Internal Medicine)    The following health maintenance items are reviewed in Epic and correct as of today:  Health Maintenance   Topic Date Due     FALL RISK ASSESSMENT  07/25/2017     INFLUENZA VACCINE (Season Ended) 09/01/2018     TETANUS IMMUNIZATION (SYSTEM ASSIGNED)  06/01/2019     ADVANCE DIRECTIVE PLANNING Q5 YRS  06/14/2022     LIPID SCREEN Q5 YR MALE (SYSTEM ASSIGNED)  08/08/2022     COLON CANCER SCREEN (SYSTEM ASSIGNED)  11/22/2026     PNEUMOCOCCAL  Completed     AORTIC ANEURYSM SCREENING (SYSTEM ASSIGNED)  Completed     HEPATITIS C SCREENING  Completed     Labs reviewed in EPIC  BP Readings from Last 3 Encounters:   05/15/18 120/68   04/12/18 124/72   05/25/17 112/68    Wt Readings from Last 3 Encounters:   05/15/18 249 lb (112.9 kg)   04/12/18 249 lb (112.9 kg)   05/25/17 252 lb (114.3 kg)                  Patient Active Problem List   Diagnosis     Ulcerative colitis (H)     Hyperlipidemia with target LDL less than 100     DARYL (obstructive sleep apnea)     Chronic ischemic heart disease     Kidney stones     Seasonal allergic rhinitis     Vitamin D deficiency     Carotid artery stenosis     Insomnia, unspecified type     Essential hypertension     Coronary artery disease involving native coronary artery of native heart without angina pectoris     ACP (advance care planning)     Past Surgical History:   Procedure Laterality Date     APPENDECTOMY       CORONARY ANGIOGRAPHY ADULT ORDER  2011, 2016    med tx, small vessels     EYE SURGERY  03/2017    retinal detachment      SURGICAL HISTORY OF -       tonsils     SURGICAL HISTORY OF -       cystoscopy for kidney stones     SURGICAL HISTORY OF -       nasal surgery       Social History   Substance Use Topics     Smoking  status: Never Smoker     Smokeless tobacco: Never Used     Alcohol use 4.2 oz/week     7 Standard drinks or equivalent per week      Comment: 1 per day     Family History   Problem Relation Age of Onset     C.A.D. Mother      C.A.D. Maternal Grandfather      CANCER Father      lung cancer, smoker.          Current Outpatient Prescriptions   Medication Sig Dispense Refill     amLODIPine (NORVASC) 2.5 MG tablet Take 1 tablet (2.5 mg) by mouth daily 90 tablet 1     aspirin 81 MG tablet Take 81 mg by mouth 2 times daily       balsalazide (COLAZAL) 750 MG capsule Take 2 tablets twice daily       cetirizine (ZYRTEC) 10 MG tablet Take 10 mg by mouth daily  30 tablet 1     HYDROcodone-acetaminophen (NORCO) 5-325 MG per tablet Take 1 tablet by mouth every 6 hours as needed for severe pain 10 tablet 0     isosorbide mononitrate (IMDUR) 60 MG 24 hr tablet Take 1 tablet (60 mg) by mouth daily 90 tablet 1     medical cannabis liquid (This is NOT a prescription, and does not certify that the patient has a qualifying medical condition for medical cannabis.  The purpose of this order is  to document that the patient reports taking medical cannabis.)       melatonin 3 MG tablet Take 1 tablet (3 mg) by mouth nightly as needed for sleep       metoprolol succinate (TOPROL-XL) 50 MG 24 hr tablet TAKE 1 TABLET DAILY 90 tablet 3     nitroglycerin (NITROLINGUAL) 0.4 MG/SPRAY spray For chest pain spray 1 spray under tongue every 5 minutes for 3 doses. If symptoms persist 5 minutes after 1st dose call 911. 4.9 g 0     ORDER FOR DME Equipment being ordered: CPAP and supplies 1 each 0     polyethylene glycol (MIRALAX) powder Take 17 g by mouth daily 510 g 1     psyllium (METAMUCIL SMOOTH TEXTURE) 63 % POWD Take 2 teaspoonful by mouth daily Mix in 8 ounces of water 1 Bottle 11     RANEXA 500 MG 12 hr tablet TAKE 1 TABLET EVERY 12 HOURS 180 tablet 3     rosuvastatin (CRESTOR) 20 MG tablet Take 1 tablet (20 mg) by mouth daily 90 tablet 0      "temazepam (RESTORIL) 30 MG capsule Take 1 capsule (30 mg) by mouth nightly as needed for sleep 15 capsule 0     [DISCONTINUED] rosuvastatin (CRESTOR) 20 MG tablet TAKE 1 TABLET DAILY 90 tablet 0     Allergies   Allergen Reactions     Sulfa Drugs          Review of Systems   Constitutional: Negative for chills and fever.   HENT: Negative for congestion, ear pain, hearing loss and sore throat.    Eyes: Negative for pain and visual disturbance.   Respiratory: Negative for cough and shortness of breath.    Cardiovascular: Negative for chest pain, palpitations and peripheral edema.   Gastrointestinal: Negative for abdominal pain, constipation, diarrhea, heartburn, hematochezia and nausea.   Genitourinary: Negative for discharge, dysuria, frequency, genital sores, hematuria, impotence and urgency.   Musculoskeletal: Negative for arthralgias, joint swelling and myalgias.   Skin: Negative for rash.   Neurological: Negative for dizziness, weakness, headaches and paresthesias.   Psychiatric/Behavioral: Negative for mood changes. The patient is not nervous/anxious.          OBJECTIVE:   /68 (BP Location: Right arm, Cuff Size: Adult Large)  Pulse 60  Temp 97.7  F (36.5  C) (Oral)  Ht 6' 0.5\" (1.842 m)  Wt 249 lb (112.9 kg)  SpO2 95%  BMI 33.31 kg/m2 Estimated body mass index is 33.31 kg/(m^2) as calculated from the following:    Height as of this encounter: 6' 0.5\" (1.842 m).    Weight as of this encounter: 249 lb (112.9 kg).  Physical Exam  GENERAL: healthy, alert and no distress  EYES: Eyes grossly normal to inspection, PERRL and conjunctivae and sclerae normal  HENT: ear canals and TM's normal, nose and mouth without ulcers or lesions  NECK: no adenopathy, no asymmetry, masses, or scars and thyroid normal to palpation  RESP: lungs clear to auscultation - no rales, rhonchi or wheezes  CV: regular rate and rhythm, normal S1 S2, no S3 or S4, no murmur, click or rub, no peripheral edema and peripheral pulses " strong  ABDOMEN: soft, nontender, no hepatosplenomegaly, no masses and bowel sounds normal   (male): normal male genitalia without lesions or urethral discharge, no hernia  RECTAL: normal sphincter tone, no rectal masses, prostate normal size, smooth, nontender without nodules or masses  MS: no gross musculoskeletal defects noted, no edema  SKIN: no suspicious lesions or rashes  NEURO: Normal strength and tone, mentation intact and speech normal  PSYCH: mentation appears normal, affect normal/bright    ASSESSMENT / PLAN:   1. Special screening for malignant neoplasm of prostate  Patient agrees to risks of screening.   - PSA, screen    2. Encounter for routine adult health examination without abnormal findings  Discussed diet, exercise, testicular self exam, blood pressure, cholesterol, and need for cancer surveillance at appropriate ages.   - TDAP VACCINE (ADACEL)  - Lipid panel reflex to direct LDL Fasting  - Comprehensive metabolic panel    3. Kidney stones  Refilled for possible future use when traveling.   - HYDROcodone-acetaminophen (NORCO) 5-325 MG per tablet; Take 1 tablet by mouth every 6 hours as needed for severe pain  Dispense: 10 tablet; Refill: 0    4. Insomnia, unspecified type  To taper off this soon.   - temazepam (RESTORIL) 30 MG capsule; Take 1 capsule (30 mg) by mouth nightly as needed for sleep  Dispense: 15 capsule; Refill: 0    5. Coronary artery disease involving native coronary artery of native heart without angina pectoris  Secondary risk factor modification   - rosuvastatin (CRESTOR) 20 MG tablet; Take 1 tablet (20 mg) by mouth daily  Dispense: 90 tablet; Refill: 0    6. Need for tetanus booster    - ADMIN 1st VACCINE        COUNSELING:  Reviewed preventive health counseling, as reflected in patient instructions       Regular exercise       Healthy diet/nutrition       Vision screening       Hearing screening       Consider lung cancer screening for ages 55-80 years and 30 pack-year  "smoking history        Colon cancer screening        Estimated body mass index is 33.31 kg/(m^2) as calculated from the following:    Height as of this encounter: 6' 0.5\" (1.842 m).    Weight as of this encounter: 249 lb (112.9 kg).  Weight management plan: Patient was referred to their PCP to discuss a diet and exercise plan.   reports that he has never smoked. He has never used smokeless tobacco.      Appropriate preventive services were discussed with this patient, including applicable screening as appropriate for cardiovascular disease, diabetes, osteopenia/osteoporosis, and glaucoma.  As appropriate for age/gender, discussed screening for colorectal cancer, prostate cancer, breast cancer, and cervical cancer. Checklist reviewing preventive services available has been given to the patient.    Reviewed patients plan of care and provided an AVS. The Basic Care Plan (routine screening as documented in Health Maintenance) for Hood meets the Care Plan requirement. This Care Plan has been established and reviewed with the Patient.    Counseling Resources:  ATP IV Guidelines  Pooled Cohorts Equation Calculator  Breast Cancer Risk Calculator  FRAX Risk Assessment  ICSI Preventive Guidelines  Dietary Guidelines for Americans, 2010  USDA's MyPlate  ASA Prophylaxis  Lung CA Screening    Michael Oliver MD  [facility]  "

## 2018-05-15 NOTE — PATIENT INSTRUCTIONS
"  Lab work downstairs today.  Directions:  As you walk through the first floor, you'll see (on the right) first the pharmacy, then some bathrooms, then the \"Lab and Imaging\" area. Give them your name at the window there and wait for them to call you.     Get your shingles vaccine (\"Shingrix\") at our pharmacy downstairs (or at another pharmacy), sometime this year--even if you've already received the old \"Zostavax\" shingles vaccine.  The \"Shingrix\" has better immunity and lasts longer, and is cheaper if you get it directly at a pharmacy.  You should not need an appointment.     You will need a second Shingrix anywhere from 2-6 months later.    Follow up in 6 months for blood pressure recheck, insomnia recheck, etc.    Michael Oliver MD  Internal Medicine and Pediatrics     Preventive Health Recommendations:       Male Ages 65 and over    Yearly exam:             See your health care provider every year in order to  o   Review health changes.   o   Discuss preventive care.    o   Review your medicines if your doctor has prescribed any.    Talk with your health care provider about whether you should have a test to screen for prostate cancer (PSA).    Every 3 years, have a diabetes test (fasting glucose). If you are at risk for diabetes, you should have this test more often.    Every 5 years, have a cholesterol test. Have this test more often if you are at risk for high cholesterol or heart disease.     Every 10 years, have a colonoscopy. Or, have a yearly FIT test (stool test). These exams will check for colon cancer.    Talk to with your health care provider about screening for Abdominal Aortic Aneurysm if you have a family history of AAA or have a history of smoking.  Shots:     Get a flu shot each year.     Get a tetanus shot every 10 years.     Talk to your doctor about your pneumonia vaccines. There are now two you should receive - Pneumovax (PPSV 23) and Prevnar (PCV 13).    Talk to your doctor about a shingles " vaccine.     Talk to your doctor about the hepatitis B vaccine.  Nutrition:     Eat at least 5 servings of fruits and vegetables each day.     Eat whole-grain bread, whole-wheat pasta and brown rice instead of white grains and rice.     Talk to your doctor about Calcium and Vitamin D.   Lifestyle    Exercise for at least 150 minutes a week (30 minutes a day, 5 days a week). This will help you control your weight and prevent disease.     Limit alcohol to one drink per day.     No smoking.     Wear sunscreen to prevent skin cancer.     See your dentist every six months for an exam and cleaning.     See your eye doctor every 1 to 2 years to screen for conditions such as glaucoma, macular degeneration and cataracts.

## 2018-05-16 LAB
ALBUMIN SERPL-MCNC: 4.3 G/DL (ref 3.4–5)
ALP SERPL-CCNC: 70 U/L (ref 40–150)
ALT SERPL W P-5'-P-CCNC: 29 U/L (ref 0–70)
ANION GAP SERPL CALCULATED.3IONS-SCNC: 10 MMOL/L (ref 3–14)
AST SERPL W P-5'-P-CCNC: 20 U/L (ref 0–45)
BILIRUB SERPL-MCNC: 0.6 MG/DL (ref 0.2–1.3)
BUN SERPL-MCNC: 12 MG/DL (ref 7–30)
CALCIUM SERPL-MCNC: 9.1 MG/DL (ref 8.5–10.1)
CHLORIDE SERPL-SCNC: 108 MMOL/L (ref 94–109)
CHOLEST SERPL-MCNC: 142 MG/DL
CO2 SERPL-SCNC: 23 MMOL/L (ref 20–32)
CREAT SERPL-MCNC: 0.93 MG/DL (ref 0.66–1.25)
GFR SERPL CREATININE-BSD FRML MDRD: 80 ML/MIN/1.7M2
GLUCOSE SERPL-MCNC: 105 MG/DL (ref 70–99)
HDLC SERPL-MCNC: 44 MG/DL
LDLC SERPL CALC-MCNC: 61 MG/DL
NONHDLC SERPL-MCNC: 98 MG/DL
POTASSIUM SERPL-SCNC: 4.3 MMOL/L (ref 3.4–5.3)
PROT SERPL-MCNC: 7.3 G/DL (ref 6.8–8.8)
SODIUM SERPL-SCNC: 141 MMOL/L (ref 133–144)
TRIGL SERPL-MCNC: 184 MG/DL

## 2018-05-18 DIAGNOSIS — R97.20 ELEVATED PROSTATE SPECIFIC ANTIGEN (PSA): Primary | ICD-10-CM

## 2018-05-22 ASSESSMENT — ENCOUNTER SYMPTOMS
LOSS OF CONSCIOUSNESS: 0
HEADACHES: 0
TINGLING: 0
NUMBNESS: 0
TREMORS: 0
SEIZURES: 0
DISTURBANCES IN COORDINATION: 0
SPEECH CHANGE: 0
DIZZINESS: 0
PARALYSIS: 0
WEAKNESS: 1
MEMORY LOSS: 0

## 2018-05-25 ENCOUNTER — OFFICE VISIT (OUTPATIENT)
Dept: UROLOGY | Facility: CLINIC | Age: 73
End: 2018-05-25
Payer: COMMERCIAL

## 2018-05-25 VITALS
WEIGHT: 250 LBS | DIASTOLIC BLOOD PRESSURE: 78 MMHG | HEIGHT: 72 IN | BODY MASS INDEX: 33.86 KG/M2 | SYSTOLIC BLOOD PRESSURE: 126 MMHG

## 2018-05-25 DIAGNOSIS — R97.20 ELEVATED PROSTATE SPECIFIC ANTIGEN (PSA): ICD-10-CM

## 2018-05-25 LAB
ALBUMIN UR-MCNC: NEGATIVE MG/DL
APPEARANCE UR: CLEAR
BILIRUB UR QL STRIP: NEGATIVE
COLOR UR AUTO: YELLOW
GLUCOSE UR STRIP-MCNC: NEGATIVE MG/DL
HGB UR QL STRIP: NEGATIVE
KETONES UR STRIP-MCNC: NEGATIVE MG/DL
LEUKOCYTE ESTERASE UR QL STRIP: NEGATIVE
NITRATE UR QL: NEGATIVE
PH UR STRIP: 5.5 PH (ref 5–7)
SOURCE: NORMAL
SP GR UR STRIP: >1.03 (ref 1–1.03)
UROBILINOGEN UR STRIP-ACNC: 1 EU/DL (ref 0.2–1)

## 2018-05-25 PROCEDURE — 99204 OFFICE O/P NEW MOD 45 MIN: CPT | Performed by: UROLOGY

## 2018-05-25 PROCEDURE — 81003 URINALYSIS AUTO W/O SCOPE: CPT | Performed by: UROLOGY

## 2018-05-25 ASSESSMENT — PAIN SCALES - GENERAL: PAINLEVEL: NO PAIN (0)

## 2018-05-25 NOTE — PROGRESS NOTES
Chief Complaint:    Elevated PSA (Prostate Specific Antigen)         Consult or Referral:     Mr. Hood Arizmendi is a 73 year old male seen in consultation from Dr. Oliver.         History of Present Illness:    Hood Arizmendi is a very pleasant 73 year old male who presents with a history of elevated PSA. Last PSA was 4 years ago. Previously had been in the 2 range. Had close fried that  from prostate cancer and is concerned about his recent elevation to 9.49.    No family history of prostate cancer. No hematuria or dysuria.  History of kidney stones. Had ureteroscopy . Periodically passes stones but none in recent past.         Past Medical History:     Past Medical History:   Diagnosis Date     CAD (coronary artery disease)     small vessel disease     Carotid artery stenosis      Chronic stable angina (H)      HTN (hypertension)      Hyperlipidemia           Past Surgical History:     Past Surgical History:   Procedure Laterality Date     APPENDECTOMY       CORONARY ANGIOGRAPHY ADULT ORDER  ,     med tx, small vessels     EYE SURGERY  2017    retinal detachment      SURGICAL HISTORY OF -       tonsils     SURGICAL HISTORY OF -       cystoscopy for kidney stones     SURGICAL HISTORY OF -       nasal surgery          Medications     Current Outpatient Prescriptions   Medication     Zoster Vac Recomb Adjuvanted (SHINGRIX IM)     amLODIPine (NORVASC) 2.5 MG tablet     aspirin 81 MG tablet     balsalazide (COLAZAL) 750 MG capsule     cetirizine (ZYRTEC) 10 MG tablet     HYDROcodone-acetaminophen (NORCO) 5-325 MG per tablet     isosorbide mononitrate (IMDUR) 60 MG 24 hr tablet     medical cannabis liquid     melatonin 3 MG tablet     metoprolol succinate (TOPROL-XL) 50 MG 24 hr tablet     nitroglycerin (NITROLINGUAL) 0.4 MG/SPRAY spray     ORDER FOR DME     polyethylene glycol (MIRALAX) powder     psyllium (METAMUCIL SMOOTH TEXTURE) 63 % POWD     RANEXA 500 MG 12 hr tablet     rosuvastatin  (CRESTOR) 20 MG tablet     temazepam (RESTORIL) 30 MG capsule     No current facility-administered medications for this visit.             Family History:     Family History   Problem Relation Age of Onset     C.A.D. Mother      C.A.D. Maternal Grandfather      CANCER Father      lung cancer, smoker.    No  malignancy         Social History:     Social History     Social History     Marital status:      Spouse name: N/A     Number of children: N/A     Years of education: N/A     Occupational History     Not on file.     Social History Main Topics     Smoking status: Never Smoker     Smokeless tobacco: Never Used     Alcohol use 4.2 oz/week     7 Standard drinks or equivalent per week      Comment: 1 per day     Drug use: No     Sexual activity: Yes     Partners: Female     Other Topics Concern     Caffeine Concern No     1 cup      Sleep Concern Yes     sleep apnea , c-pap      Special Diet No     Exercise No     Parent/Sibling W/ Cabg, Mi Or Angioplasty Before 65f 55m? Yes     unknown     Social History Narrative   Retired psychiatric social worker and worked in [employer]         Allergies:   Sulfa drugs         Review of Systems:  From intake questionnaire     Negative 14 system review except as noted on HPI, nurse's note.         Physical Exam:     Patient is a 73 year old  male   Vitals: Blood pressure 126/78, height 1.829 m (6'), weight 113.4 kg (250 lb).  General Appearance Adult: Body mass index is 33.91 kg/(m^2).  Alert, no acute distress, oriented  HENT: throat/mouth:normal, good dentition  Lungs: no respiratory distress, or pursed lip breathing  Heart: No obvious jugular venous distension present  Abdomen: soft, nontender, no organomegaly or masses,   Lymphatics: No inguinal adenopathy  Musculoskeltal: extremities normal, no peripheral edema  Skin: no suspicious lesions or rashes  Neuro: Alert, oriented, speech and mentation normal  Psych: affect and mood normal  Gait: Normal  : penis, scrotum,  testes normal, BELEN anodular, symmetric      Labs and Pathology:    I reviewed all applicable laboratory and pathology data and went over findings with patient  Significant for   Lab Results   Component Value Date    CR 0.93 05/15/2018    CR 0.88 12/13/2016    CR 0.84 08/26/2016    CR 0.89 07/25/2016    CR 1.12 10/27/2015    CR 1.00 04/07/2015    CR 1.05 12/05/2014    CR 0.94 04/25/2014     PSA   Date Value Ref Range Status   05/15/2018 9.49 (H) 0 - 4 ug/L Final     Comment:     Assay Method:  Chemiluminescence using Siemens Vista analyzer         Imaging:    I reviewed all applicable imaging and went over findings with patient.    CT abd/pelvis 12/19/2016  IMPRESSION:   1. Nonobstructing 0.3 cm stone in the upper pole of the left kidney.  2. Mild prostatic enlargement.  3. Scattered colonic diverticulosis, without convincing evidence for  diverticulitis.         Assessment and Plan:     Assessment: 73 year old male with an elevated PSA to 9.49. We had a long discussion about the utility of PSA screening.  We talked about the Pros and Cons.  We discussed the findings of the PLCO and EORTC studies.  We discussed the results of the [place] trial of treatment vs. observation in clinically detected prostate cancer as well as the results of the PIVOT trial in the context of screen-detected cancer.  We discussed the recommendations by the AUA, and USPSTF. We also discussed the significant (2-6%) and rising risk of biopsy associated sepsis.    We plugged his numbers into the PCPT individualized risk calculator and found   the risk of a negative biopsy is 62%  The risk of low risk cancer is 23%  The risk of intermediate to high risk is 15%    We also discussed the emerging role of MRI in the diagnosis of prostate cancer and the potential for performing MRI-Ultrasound Fusion biopsy if suspicious lesions are noted.  He would like to proceed with an MRI. Will also plan repeat PSA when he returns to review results to  confirm this current degree of PSA elevation.    Plan:  Prostate MRI  Repeat PSA at next follow-up    Orders  Orders Placed This Encounter   Procedures     MR Prostate [[id number]]     William Terrell MD  Urology  [facility]  Clinic Phone [phone]      Answers for HPI/ROS submitted by the patient on 5/22/2018   General Symptoms: No  Skin Symptoms: No  HENT Symptoms: No  EYE SYMPTOMS: No  HEART SYMPTOMS: No  LUNG SYMPTOMS: No  INTESTINAL SYMPTOMS: No  URINARY SYMPTOMS: No  REPRODUCTIVE SYMPTOMS: No  SKELETAL SYMPTOMS: No  BLOOD SYMPTOMS: No  NERVOUS SYSTEM SYMPTOMS: Yes  MENTAL HEALTH SYMPTOMS: No  Trouble with coordination: No  Dizziness or trouble with balance: No  Fainting or black-out spells: No  Memory loss: No  Headache: No  Seizures: No  Speech problems: No  Tingling: No  Tremor: No  Weakness: Yes  Difficulty walking: No  Paralysis: No  Numbness: No

## 2018-05-25 NOTE — MR AVS SNAPSHOT
After Visit Summary   5/25/2018    Hood Arizmendi    MRN: [id number]           Patient Information     Date Of Birth          1945        Visit Information        Provider Department      5/25/2018 9:30 AM William Terrell MD [facility]        Today's Diagnoses     Elevated prostate specific antigen (PSA)           Follow-ups after your visit        Follow-up notes from your care team     Return in about 4 weeks (around 6/22/2018).      Your next 10 appointments already scheduled     Jun 27, 2018 10:30 AM CDT   Return Visit with Greg King MD   [facility] [place] [facility]    [address]  [address]  [facility] [address] [phone]              Future tests that were ordered for you today     Open Future Orders        Priority Expected Expires Ordered    MR Prostate [[id number]] Routine  5/25/2019 5/25/2018            Who to contact     If you have questions or need follow up information about today's clinic visit or your schedule please contact [facility] directly at [phone].  Normal or non-critical lab and imaging results will be communicated to you by MyChart, letter or phone within 4 business days after the clinic has received the results. If you do not hear from us within 7 days, please contact the clinic through [employer] or phone. If you have a critical or abnormal lab result, we will notify you by phone as soon as possible.  Submit refill requests through [employer] or call your pharmacy and they will forward the refill request to us. Please allow 3 business days for your refill to be completed.          Additional Information About Your Visit        MyChart Information     [employer] gives you secure access to your electronic health record. If you see a primary care provider, you can also send messages to your care team  and make appointments. If you have questions, please call your primary care clinic.  If you do not have a primary care provider, please call [phone] and they will assist you.        Care EveryWhere ID     This is your Care EveryWhere ID. This could be used by other organizations to access your [facility] medical records  [id number]        Your Vitals Were     Height BMI (Body Mass Index)                1.829 m (6') 33.91 kg/m2           Blood Pressure from Last 3 Encounters:   05/25/18 126/78   05/15/18 120/68   04/12/18 124/72    Weight from Last 3 Encounters:   05/25/18 113.4 kg (250 lb)   05/15/18 112.9 kg (249 lb)   04/12/18 112.9 kg (249 lb)              We Performed the Following     UA without Microscopic        Primary Care Provider Office Phone # Fax #    Michael Oliver [phone] [address] [place] [address]        Equal Access to Services     [facility]: Hadii aad ku hadasho Soomaali, waaxda luqadaha, qaybta kaalmada adeegyada, waxay idiin haybrittanyn kendra mcneill . So [facility] [phone].    ATENCIÓN: Si habla español, tiene a worley disposición servicios gratuitos de asistencia lingüística. [facility] [phone].    We comply with applicable federal civil rights laws and [place] laws. We do not discriminate on the basis of race, color, national origin, age, disability, sex, sexual orientation, or gender identity.            Thank you!     Thank you for choosing [facility]  for your care. Our goal is always to provide you with excellent care. Hearing back from our patients is one way we can continue to improve our services. Please take a few minutes to complete the written survey that you may receive in the mail after your visit with us. Thank you!             Your Updated Medication List - Protect others around you: Learn how to safely use, store and throw away your medicines at www.disposemymeds.org.          This  list is accurate as of 5/25/18 11:20 AM.  Always use your most recent med list.                   Brand Name Dispense Instructions for use Diagnosis    amLODIPine 2.5 MG tablet    NORVASC    90 tablet    Take 1 tablet (2.5 mg) by mouth daily    Coronary artery disease involving native coronary artery of native heart without angina pectoris       aspirin 81 MG tablet      Take 81 mg by mouth 2 times daily        balsalazide 750 MG capsule    COLAZAL     Take 2 tablets twice daily        cetirizine 10 MG tablet    zyrTEC    30 tablet    Take 10 mg by mouth daily        HYDROcodone-acetaminophen 5-325 MG per tablet    NORCO    10 tablet    Take 1 tablet by mouth every 6 hours as needed for severe pain    Kidney stones       isosorbide mononitrate 60 MG 24 hr tablet    IMDUR    90 tablet    Take 1 tablet (60 mg) by mouth daily    Coronary artery disease involving native coronary artery of native heart, angina presence unspecified       medical cannabis liquid      (This is NOT a prescription, and does not certify that the patient has a qualifying medical condition for medical cannabis.  The purpose of this order is  to document that the patient reports taking medical cannabis.)        melatonin 3 MG tablet      Take 1 tablet (3 mg) by mouth nightly as needed for sleep        METAMUCIL SMOOTH TEXTURE 63 % Powd   Generic drug:  psyllium     1 Bottle    Take 2 teaspoonful by mouth daily Mix in 8 ounces of water        metoprolol succinate 50 MG 24 hr tablet    TOPROL-XL    90 tablet    TAKE 1 TABLET DAILY    Coronary artery disease involving native coronary artery of native heart without angina pectoris       MIRALAX powder   Generic drug:  polyethylene glycol     510 g    Take 17 g by mouth daily        nitroGLYcerin 0.4 MG/SPRAY spray    NITROLINGUAL    4.9 g    For chest pain spray 1 spray under tongue every 5 minutes for 3 doses. If symptoms persist 5 minutes after 1st dose call 911.        order for DME     1 each     Equipment being ordered: CPAP and supplies    DARYL (obstructive sleep apnea)       RANEXA 500 MG 12 hr tablet   Generic drug:  ranolazine     180 tablet    TAKE 1 TABLET EVERY 12 HOURS    Coronary artery disease involving native coronary artery of native heart without angina pectoris       rosuvastatin 20 MG tablet    CRESTOR    90 tablet    Take 1 tablet (20 mg) by mouth daily    Coronary artery disease involving native coronary artery of native heart without angina pectoris       SHINGRIX IM           temazepam 30 MG capsule    RESTORIL    15 capsule    Take 1 capsule (30 mg) by mouth nightly as needed for sleep    Insomnia, unspecified type

## 2018-05-25 NOTE — LETTER
2018     RE: Hood Arizmendi  [address]  [place] [facility] [place] [address]     Dear Colleague,    Thank you for referring your patient, Hood Arizmendi, to the [facility] at [facility]. Please see a copy of my visit note below.          Chief Complaint:    Elevated PSA (Prostate Specific Antigen)         Consult or Referral:     Mr. Hood Arizmendi is a 73 year old male seen in consultation from Dr. Oliver.         History of Present Illness:    Hood Arizmendi is a very pleasant 73 year old male who presents with a history of elevated PSA. Last PSA was 4 years ago. Previously had been in the 2 range. Had close fried that  from prostate cancer and is concerned about his recent elevation to 9.49.    No family history of prostate cancer. No hematuria or dysuria.  History of kidney stones. Had ureteroscopy . Periodically passes stones but none in recent past.         Past Medical History:     Past Medical History:   Diagnosis Date     CAD (coronary artery disease)     small vessel disease     Carotid artery stenosis      Chronic stable angina (H)      HTN (hypertension)      Hyperlipidemia           Past Surgical History:     Past Surgical History:   Procedure Laterality Date     APPENDECTOMY       CORONARY ANGIOGRAPHY ADULT ORDER  ,     med tx, small vessels     EYE SURGERY  2017    retinal detachment      SURGICAL HISTORY OF -       tonsils     SURGICAL HISTORY OF -       cystoscopy for kidney stones     SURGICAL HISTORY OF -       nasal surgery          Medications     Current Outpatient Prescriptions   Medication     Zoster Vac Recomb Adjuvanted (SHINGRIX IM)     amLODIPine (NORVASC) 2.5 MG tablet     aspirin 81 MG tablet     balsalazide (COLAZAL) 750 MG capsule     cetirizine (ZYRTEC) 10 MG tablet     HYDROcodone-acetaminophen (NORCO) 5-325 MG per tablet     isosorbide mononitrate (IMDUR) 60 MG 24 hr tablet     medical  cannabis liquid     melatonin 3 MG tablet     metoprolol succinate (TOPROL-XL) 50 MG 24 hr tablet     nitroglycerin (NITROLINGUAL) 0.4 MG/SPRAY spray     ORDER FOR DME     polyethylene glycol (MIRALAX) powder     psyllium (METAMUCIL SMOOTH TEXTURE) 63 % POWD     RANEXA 500 MG 12 hr tablet     rosuvastatin (CRESTOR) 20 MG tablet     temazepam (RESTORIL) 30 MG capsule     No current facility-administered medications for this visit.             Family History:     Family History   Problem Relation Age of Onset     C.A.D. Mother      C.A.D. Maternal Grandfather      CANCER Father      lung cancer, smoker.    No  malignancy         Social History:     Social History     Social History     Marital status:      Spouse name: N/A     Number of children: N/A     Years of education: N/A     Occupational History     Not on file.     Social History Main Topics     Smoking status: Never Smoker     Smokeless tobacco: Never Used     Alcohol use 4.2 oz/week     7 Standard drinks or equivalent per week      Comment: 1 per day     Drug use: No     Sexual activity: Yes     Partners: Female     Other Topics Concern     Caffeine Concern No     1 cup      Sleep Concern Yes     sleep apnea , c-pap      Special Diet No     Exercise No     Parent/Sibling W/ Cabg, Mi Or Angioplasty Before 65f 55m? 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         Physical Exam:     Patient is a 73 year old  male   Vitals: Blood pressure 126/78, height 1.829 m (6'), weight 113.4 kg (250 lb).  General Appearance Adult: Body mass index is 33.91 kg/(m^2).  Alert, no acute distress, oriented  HENT: throat/mouth:normal, good dentition  Lungs: no respiratory distress, or pursed lip breathing  Heart: No obvious jugular venous distension  present  Abdomen: soft, nontender, no organomegaly or masses,   Lymphatics: No inguinal adenopathy  Musculoskeltal: extremities normal, no peripheral edema  Skin: no suspicious lesions or rashes  Neuro: Alert, oriented, speech and mentation normal  Psych: affect and mood normal  Gait: Normal  : penis, scrotum, testes normal, BELEN anodular, symmetric      Labs and Pathology:    I reviewed all applicable laboratory and pathology data and went over findings with patient  Significant for   Lab Results   Component Value Date    CR 0.93 05/15/2018    CR 0.88 12/13/2016    CR 0.84 08/26/2016    CR 0.89 07/25/2016    CR 1.12 10/27/2015    CR 1.00 04/07/2015    CR 1.05 12/05/2014    CR 0.94 04/25/2014     PSA   Date Value Ref Range Status   05/15/2018 9.49 (H) 0 - 4 ug/L Final     Comment:     Assay Method:  Chemiluminescence using Siemens Vista analyzer         Imaging:    I reviewed all applicable imaging and went over findings with patient.    CT abd/pelvis 12/19/2016  IMPRESSION:   1. Nonobstructing 0.3 cm stone in the upper pole of the left kidney.  2. Mild prostatic enlargement.  3. Scattered colonic diverticulosis, without convincing evidence for  diverticulitis.         Assessment and Plan:     Assessment: 73 year old male with an elevated PSA to 9.49. We had a long discussion about the utility of PSA screening.  We talked about the Pros and Cons.  We discussed the findings of the PLCO and EORTC studies.  We discussed the results of the [place] trial of treatment vs. observation in clinically detected prostate cancer as well as the results of the PIVOT trial in the context of screen-detected cancer.  We discussed the recommendations by the AUA, and USPSTF. We also discussed the significant (2-6%) and rising risk of biopsy associated sepsis.    We plugged his numbers into the PCPT individualized risk calculator and found   the risk of a negative biopsy is 62%  The risk of low risk cancer is 23%  The risk of  intermediate to high risk is 15%    We also discussed the emerging role of MRI in the diagnosis of prostate cancer and the potential for performing MRI-Ultrasound Fusion biopsy if suspicious lesions are noted.  He would like to proceed with an MRI. Will also plan repeat PSA when he returns to review results to confirm this current degree of PSA elevation.    Plan:  Prostate MRI  Repeat PSA at next follow-up    Orders  Orders Placed This Encounter   Procedures     MR Prostate [[id number]]     William Terrell MD  Urology  [facility]  Clinic Phone [phone]      Answers for HPI/ROS submitted by the patient on 5/22/2018   General Symptoms: No  Skin Symptoms: No  HENT Symptoms: No  EYE SYMPTOMS: No  HEART SYMPTOMS: No  LUNG SYMPTOMS: No  INTESTINAL SYMPTOMS: No  URINARY SYMPTOMS: No  REPRODUCTIVE SYMPTOMS: No  SKELETAL SYMPTOMS: No  BLOOD SYMPTOMS: No  NERVOUS SYSTEM SYMPTOMS: Yes  MENTAL HEALTH SYMPTOMS: No  Trouble with coordination: No  Dizziness or trouble with balance: No  Fainting or black-out spells: No  Memory loss: No  Headache: No  Seizures: No  Speech problems: No  Tingling: No  Tremor: No  Weakness: Yes  Difficulty walking: No  Paralysis: No  Numbness: No    Again, thank you for allowing me to participate in the care of your patient.      Sincerely,    William Terrell MD

## 2018-06-23 ENCOUNTER — RADIANT APPOINTMENT (OUTPATIENT)
Dept: MRI IMAGING | Facility: CLINIC | Age: 73
End: 2018-06-23
Attending: UROLOGY
Payer: COMMERCIAL

## 2018-06-23 DIAGNOSIS — R97.20 ELEVATED PROSTATE SPECIFIC ANTIGEN (PSA): ICD-10-CM

## 2018-06-23 RX ORDER — GADOBUTROL 604.72 MG/ML
10 INJECTION INTRAVENOUS ONCE
Status: COMPLETED | OUTPATIENT
Start: 2018-06-23 | End: 2018-06-23

## 2018-06-23 RX ADMIN — GADOBUTROL 10 ML: 604.72 INJECTION INTRAVENOUS at 13:13

## 2018-06-23 NOTE — DISCHARGE INSTRUCTIONS
MRI Contrast Discharge Instructions    The IV contrast you received today will pass out of your body in your  urine. This will happen in the next 24 hours. You will not feel this process.  Your urine will not change color.    Drink at least 4 extra glasses of water or juice today (unless your doctor  has restricted your fluids). This reduces the stress on your kidneys.  You may take your regular medicines.    If you are on dialysis: It is best to have dialysis today.    If you have a reaction: Most reactions happen right away. If you have  any new symptoms after leaving the hospital (such as hives or swelling),  call your hospital at the correct number below. Or call your family doctor.  If you have breathing distress or wheezing, call 911.    Special instructions: ***    I have read and understand the above information.    Signature:______________________________________ Date:___________    Staff:__________________________________________ Date:___________     Time:__________    [place] [facility] Departments:    ___[place]: [phone]  ___[facility]: [phone]  ___[place]: [phone] ___[facility]: [phone]  ___[facility]: [phone]  ___[facility]: [phone]  ___[phone]  ___[facility]: [phone]  ___[phone]

## 2018-06-25 DIAGNOSIS — R97.20 ELEVATED PROSTATE SPECIFIC ANTIGEN (PSA): Primary | ICD-10-CM

## 2018-06-26 DIAGNOSIS — R97.20 ELEVATED PROSTATE SPECIFIC ANTIGEN (PSA): ICD-10-CM

## 2018-06-26 PROCEDURE — 84153 ASSAY OF PSA TOTAL: CPT | Performed by: UROLOGY

## 2018-06-26 PROCEDURE — 36415 COLL VENOUS BLD VENIPUNCTURE: CPT | Performed by: UROLOGY

## 2018-06-27 LAB — PSA SERPL-MCNC: 11.4 UG/L (ref 0–4)

## 2018-06-28 ENCOUNTER — OFFICE VISIT (OUTPATIENT)
Dept: UROLOGY | Facility: CLINIC | Age: 73
End: 2018-06-28
Payer: COMMERCIAL

## 2018-06-28 VITALS — HEIGHT: 72 IN | BODY MASS INDEX: 33.86 KG/M2 | OXYGEN SATURATION: 96 % | WEIGHT: 250 LBS | HEART RATE: 62 BPM

## 2018-06-28 DIAGNOSIS — R97.20 ELEVATED PROSTATE SPECIFIC ANTIGEN (PSA): Primary | ICD-10-CM

## 2018-06-28 PROCEDURE — 99213 OFFICE O/P EST LOW 20 MIN: CPT | Performed by: UROLOGY

## 2018-06-28 ASSESSMENT — PAIN SCALES - GENERAL: PAINLEVEL: NO PAIN (0)

## 2018-06-28 NOTE — PROGRESS NOTES
CHIEF COMPLAINT   It was my pleasure to see Hood Arizmendi who is a 73 year old male for follow-up of elevated PSA.      HPI   Hood Arizmendi is a very pleasant 73 year old male who presents with a history of Elevated PSA (Prostate Specific Antigen).  PSA was 11.4 on recheck. MRI demonstrates PIRADS 5 lesion, as below.    PSA   Date Value Ref Range Status   06/26/2018 11.40 (H) 0 - 4 ug/L Final     Comment:     Assay Method:  Chemiluminescence using Siemens Republic analyzer     MRI Prostate 6/23/2018  IMPRESSION:   1. Based on the most suspicious abnormality, this exam is  characterized as PIRADS 5 - Very high probability.  Clinically  significant cancer is highly likely to be present.  The most  suspicious abnormality is located at the transitional zone, mid gland,  anterior horn, 11-1:00 position. There is concern for extracapsular  extension with abutment along the urinary bladder neck and also  extension within the anterior fibromuscular stroma, as described  above.  2. PIRADS 4 lesion at the peripheral zone, right apex, 7:00 position.  No extracapsular extension. PIRADS 4 - High probability.  Clinically  significant cancer is likely to be present.  3. No evidence of extraprostatic malignancy. No suspicious adenopathy  or evidence of pelvic metastases.    PHYSICAL EXAM  Patient is a 73 year old  male   Vitals: Pulse 62, height 1.829 m (6'), weight 113.4 kg (250 lb), SpO2 96 %.  General Appearance Adult: Body mass index is 33.91 kg/(m^2).  Alert, no acute distress, oriented  HENT: throat/mouth:normal, good dentition  Lungs: no respiratory distress, or pursed lip breathing  Heart: No obvious jugular venous distension present  Abdomen: soft, nontender, no organomegaly or masses  Musculoskeltal: extremities normal, no peripheral edema  Skin: no suspicious lesions or rashes  Neuro: Alert, oriented, speech and mentation normal  Psych: affect and mood normal  Gait: Normal    ASSESSMENT and PLAN  73 year old male with  elevated PSA and suspicious lesion on MRI. Discussed options and he would like to proceed with a fusion biopsy. Discussed risks of biopsy including bleeding and infection, including risk of sepsis. He would like to proceed. Will schedule in the near future.    - MRI/US Fusion biopsy    I spent over 15 minutes with the patient.  Over half this time was spent on counseling regarding elevated PSA.    William Terrell MD  Urology  [facility]  Clinic Phone [phone]

## 2018-06-28 NOTE — MR AVS SNAPSHOT
After Visit Summary   6/28/2018    Hood Arizmendi    MRN: [id number]           Patient Information     Date Of Birth          1945        Visit Information        Provider Department      6/28/2018 2:30 PM William Terrell MD [facility] [place]        Today's Diagnoses     Elevated prostate specific antigen (PSA)    -  1       Follow-ups after your visit        Who to contact     If you have questions or need follow up information about today's clinic visit or your schedule please contact [facility] [place] directly at [phone].  Normal or non-critical lab and imaging results will be communicated to you by Fare[employer], letter or phone within 4 business days after the clinic has received the results. If you do not hear from us within 7 days, please contact the clinic through Fareyehart or phone. If you have a critical or abnormal lab result, we will notify you by phone as soon as possible.  Submit refill requests through [employer] or call your pharmacy and they will forward the refill request to us. Please allow 3 business days for your refill to be completed.          Additional Information About Your Visit        MyChart Information     [employer] gives you secure access to your electronic health record. If you see a primary care provider, you can also send messages to your care team and make appointments. If you have questions, please call your primary care clinic.  If you do not have a primary care provider, please call [phone] and they will assist you.        Care EveryWhere ID     This is your Care EveryWhere ID. This could be used by other organizations to access your [place] medical records  [id number]        Your Vitals Were     Pulse Height Pulse Oximetry BMI (Body Mass Index)          62 1.829 m (6') 96% 33.91 kg/m2         Blood Pressure from Last 3 Encounters:   05/25/18 126/78   05/15/18 120/68    04/12/18 124/72    Weight from Last 3 Encounters:   06/28/18 113.4 kg (250 lb)   05/25/18 113.4 kg (250 lb)   05/15/18 112.9 kg (249 lb)              Today, you had the following     No orders found for display       Primary Care Provider Office Phone # Fax #    Michael Oliver [phone] [address] [place] [address]        Equal Access to Services     [facility]: Hadii aad ku hadasho Soomaali, waaxda luqadaha, qaybta kaalmada adeegyada, waxay idiin hayaan adeeg kharash la'aan . So [facility] [phone].    ATENCIÓN: Si habla español, tiene a worley disposición servicios gratuitos de asistencia lingüística. [facility] [phone].    We comply with applicable federal civil rights laws and [place] laws. We do not discriminate on the basis of race, color, national origin, age, disability, sex, sexual orientation, or gender identity.            Thank you!     Thank you for choosing [facility] [place]  for your care. Our goal is always to provide you with excellent care. Hearing back from our patients is one way we can continue to improve our services. Please take a few minutes to complete the written survey that you may receive in the mail after your visit with us. Thank you!             Your Updated Medication List - Protect others around you: Learn how to safely use, store and throw away your medicines at www.disposemymeds.org.          This list is accurate as of 6/28/18  3:46 PM.  Always use your most recent med list.                   Brand Name Dispense Instructions for use Diagnosis    amLODIPine 2.5 MG tablet    NORVASC    90 tablet    Take 1 tablet (2.5 mg) by mouth daily    Coronary artery disease involving native coronary artery of native heart without angina pectoris       aspirin 81 MG tablet      Take 81 mg by mouth 2 times daily        balsalazide 750 MG capsule    COLAZAL     Take 2 tablets twice daily        cetirizine 10 MG  tablet    zyrTEC    30 tablet    Take 10 mg by mouth daily        HYDROcodone-acetaminophen 5-325 MG per tablet    NORCO    10 tablet    Take 1 tablet by mouth every 6 hours as needed for severe pain    Kidney stones       isosorbide mononitrate 60 MG 24 hr tablet    IMDUR    90 tablet    Take 1 tablet (60 mg) by mouth daily    Coronary artery disease involving native coronary artery of native heart, angina presence unspecified       medical cannabis liquid      (This is NOT a prescription, and does not certify that the patient has a qualifying medical condition for medical cannabis.  The purpose of this order is  to document that the patient reports taking medical cannabis.)        melatonin 3 MG tablet      Take 1 tablet (3 mg) by mouth nightly as needed for sleep        METAMUCIL SMOOTH TEXTURE 63 % Powd   Generic drug:  psyllium     1 Bottle    Take 2 teaspoonful by mouth daily Mix in 8 ounces of water        metoprolol succinate 50 MG 24 hr tablet    TOPROL-XL    90 tablet    TAKE 1 TABLET DAILY    Coronary artery disease involving native coronary artery of native heart without angina pectoris       MIRALAX powder   Generic drug:  polyethylene glycol     510 g    Take 17 g by mouth daily        nitroGLYcerin 0.4 MG/SPRAY spray    NITROLINGUAL    4.9 g    For chest pain spray 1 spray under tongue every 5 minutes for 3 doses. If symptoms persist 5 minutes after 1st dose call 911.        order for DME     1 each    Equipment being ordered: CPAP and supplies    DARYL (obstructive sleep apnea)       RANEXA 500 MG 12 hr tablet   Generic drug:  ranolazine     180 tablet    TAKE 1 TABLET EVERY 12 HOURS    Coronary artery disease involving native coronary artery of native heart without angina pectoris       rosuvastatin 20 MG tablet    CRESTOR    90 tablet    Take 1 tablet (20 mg) by mouth daily    Coronary artery disease involving native coronary artery of native heart without angina pectoris       SHINGRIX IM            temazepam 30 MG capsule    RESTORIL    15 capsule    Take 1 capsule (30 mg) by mouth nightly as needed for sleep    Insomnia, unspecified type

## 2018-06-30 PROBLEM — R97.20 ELEVATED PROSTATE SPECIFIC ANTIGEN (PSA): Status: ACTIVE | Noted: 2018-06-30

## 2018-07-13 ENCOUNTER — TRANSFERRED RECORDS (OUTPATIENT)
Dept: HEALTH INFORMATION MANAGEMENT | Facility: CLINIC | Age: 73
End: 2018-07-13

## 2018-07-16 DIAGNOSIS — R97.20 ELEVATED PROSTATE SPECIFIC ANTIGEN (PSA): Primary | ICD-10-CM

## 2018-07-16 RX ORDER — CIPROFLOXACIN 500 MG/1
500 TABLET, FILM COATED ORAL 2 TIMES DAILY
Qty: 6 TABLET | Refills: 0 | Status: SHIPPED | OUTPATIENT
Start: 2018-07-16 | End: 2019-09-20

## 2018-07-17 ENCOUNTER — PRE VISIT (OUTPATIENT)
Dept: UROLOGY | Facility: CLINIC | Age: 73
End: 2018-07-17

## 2018-07-23 ENCOUNTER — OFFICE VISIT (OUTPATIENT)
Dept: UROLOGY | Facility: CLINIC | Age: 73
End: 2018-07-23
Payer: COMMERCIAL

## 2018-07-23 VITALS
HEIGHT: 72 IN | DIASTOLIC BLOOD PRESSURE: 74 MMHG | WEIGHT: 250 LBS | OXYGEN SATURATION: 91 % | SYSTOLIC BLOOD PRESSURE: 118 MMHG | HEART RATE: 66 BPM | BODY MASS INDEX: 33.86 KG/M2

## 2018-07-23 DIAGNOSIS — R97.20 ELEVATED PROSTATE SPECIFIC ANTIGEN (PSA): Primary | ICD-10-CM

## 2018-07-23 PROBLEM — H35.3290 AMD (AGE-RELATED MACULAR DEGENERATION), WET (H): Status: ACTIVE | Noted: 2018-05-01

## 2018-07-23 ASSESSMENT — PAIN SCALES - GENERAL
PAINLEVEL: NO PAIN (0)
PAINLEVEL: NO PAIN (0)

## 2018-07-23 NOTE — PROGRESS NOTES
PREPROCEDURE DIAGNOSIS: Elevated PSA.   POSTPROCEDURE DIAGNOSIS: Elevated PSA.   PROCEDURE: Transrectal ultrasound sizing and transrectal ultrasound guided prostatic needle biopsy, including MRI fusion targeting  for Hood Arizmendi who is a 73 year old male.     PSA   Date Value Ref Range Status   06/26/2018 11.40 (H) 0 - 4 ug/L Final     Comment:     Assay Method:  Chemiluminescence using Siemens Vista analyzer   SURGEON: William Terrell  ANESTHESIA: 5 mL of 1% periprostatic block bilaterally   We previously obtained an MRI of the prostate and identified all PIRADS 3-5 lesions for targeting    Target Lesion #1 PIRADS 5 - mid transition zone, anterior horn   Target Lesion #2  PIRADS 4 - Right apex    DESCRIPTION OF PROCEDURE: The procedure, the outcome, the anesthesia, and the risks were discussed with the patient. Informed consent was obtained and signed and a timeout was completed prior to the procedure. Digital rectal examination was performed with the below findings noted. Anesthesia was administered as noted above and the transrectal ultrasound probe was inserted, sizing was performed, and the below findings were noted. 2 core biopsies were taken from each of the two MRI targets, and 12 core biopsies were taken as described below. The probe was then withdrawn. Patient tolerated the procedure well.   FINDINGS: Digital rectal exam reveals normal prostate. Total volume is 48 mL. Hypoechoic lesion bilaterally. US images were obtained and then fused with the MRI images.  The fused images were then used to guide the biopsy of the targeted lesions with 2 cores taken of each lesion.  We then performed random biopsies.  12 cores were taken with 6 on each side, base, mid and apex.  PLAN: Will follow-up next week to review results.    William Terrell MD  Urology  [facility]  Clinic Phone [phone]

## 2018-07-23 NOTE — NURSING NOTE
Chief Complaint   Patient presents with     Procedure     MRI fusion biopsy- prostate-elevated PSA       Amarilys Esqueda MA

## 2018-07-23 NOTE — NURSING NOTE
The following medication was given:     MEDICATION: Gentamicin  ROUTE: IM  SITE: RUQ - Gluteus  DOSE: 80 mg  LOT #: [id number]  :  [employer]  EXPIRATION DATE:  09/2019  NDC#: 34834-272-41    Patient tolerated injection well.    KELECHI Johnson        The following medication was given:     MEDICATION: Lidocaine 1%  ROUTE: Administered by MD  SITE: Administered by MD  DOSE: 50 mg/5 mL x 2  LOT #: [id number]  :  [employer]  EXPIRATION DATE:  05/22  NDC#: 66270-215-16    KELECHI Johnson      DRUG WASTE- NONE

## 2018-07-23 NOTE — PATIENT INSTRUCTIONS
Dr. Terrell's office will contact you for your follow up appointment.    It was a pleasure meeting with you today.  Thank you for allowing me and my team the privilege of caring for you today.  YOU are the reason we are here, and I truly hope we provided you with the excellent service you deserve.  Please let us know if there is anything else we can do for you so that we can be sure you are leaving completely satisfied with your care experience.        Emelia Bagley, [facility]            [facility]  Precautions Following a Prostate Biopsy    There are four conditions that you should watch for after a prostate biopsy:    1. Excessive pain  2. Bleeding irregularities (passing numerous  dime sized  clots or if your urine looks like cranberry juice)  3. Fever of 100 degrees or more  4. If you are unable to urinate        If any of these occur, call the Urology Clinic during normal business hours (M-F, 8:00-4:30) at [phone].  If you experience a problem after normal business hours, call our 24-hour phone number at [phone] and ask for the Urology Resident on call to be paged.      If you experience any discomfort following the biopsy, you may take Tylenol.  DO NOT TAKE ASPIRIN unless specified by your physician.   If the discomfort becomes severe or uncontrolled by medication, contact the Urology Clinic or Urology Resident (after normal business hours).      Do not be alarmed if you have some blood in your stool, in your urine, or ejaculate (semen).  This occurrence is normal and may last up to three (3) or four (4) days, usually intermittently.  Blood in the ejaculate (semen) may last several weeks, up to about a dozen ejaculations.  The blood in your ejaculate may appear as brown streaks, blood tinged, and immediately following a biopsy, it may appear bright red.      If you run a fever above 100 degrees, call the Urology Clinic or Urology Resident (after normal business hours)  immediately.  If you are unable to reach your physician or the Resident on call, go to the nearest emergency room.  Explain that you have had a transrectal biopsy of your prostate and what problems you are experiencing.        You should attempt to urinate following your biopsy before you leave the clinic.  If you are unable to urinate four (4) to six (6) hours after you leave the clinic, you will need to contact the Urology Clinic or the Resident on call.  If you are unable to reach your physician or the Resident on call, go to the nearest emergency room.            If you have any questions or concerns after your biopsy, feel free to contact the Urology Clinic at [phone] during M-F, 8:00-5pm business hours.  If you need to speak with someone after normal business hours, call [phone] and ask for the Resident on call to be paged.

## 2018-07-23 NOTE — MR AVS SNAPSHOT
After Visit Summary   7/23/2018    Hood DEVINE Page    MRN: [id number]           Patient Information     Date Of Birth          1945        Visit Information        Provider Department      7/23/2018 10:00 AM William Trerell MD [facility] and [facility]rolog[facility]        Today's Diagnoses     Elevated prostate specific antigen (PSA)    -  1      Care Instructions    Dr. Terrell's office will contact you for your follow up appointment.    It was a pleasure meeting with you today.  Thank you for allowing me and my team the privilege of caring for you today.  YOU are the reason we are here, and I truly hope we provided you with the excellent service you deserve.  Please let us know if there is anything else we can do for you so that we can be sure you are leaving completely satisfied with your care experience.        Emelia Bagley, [facility]  Precautions Following a Prostate Biopsy    There are four conditions that you should watch for after a prostate biopsy:    1. Excessive pain  2. Bleeding irregularities (passing numerous  dime sized  clots or if your urine looks like cranberry juice)  3. Fever of 100 degrees or more  4. If you are unable to urinate        If any of these occur, call the Urology Clinic during normal business hours (M-F, 8:00-4:30) at [phone].  If you experience a problem after normal business hours, call our 24-hour phone number at [phone] and ask for the Urology Resident on call to be paged.      If you experience any discomfort following the biopsy, you may take Tylenol.  DO NOT TAKE ASPIRIN unless specified by your physician.   If the discomfort becomes severe or uncontrolled by medication, contact the Urology Clinic or Urology Resident (after normal business hours).      Do not be alarmed if you have some blood in your stool, in your urine, or ejaculate (semen).  This occurrence is  normal and may last up to three (3) or four (4) days, usually intermittently.  Blood in the ejaculate (semen) may last several weeks, up to about a dozen ejaculations.  The blood in your ejaculate may appear as brown streaks, blood tinged, and immediately following a biopsy, it may appear bright red.      If you run a fever above 100 degrees, call the Urology Clinic or Urology Resident (after normal business hours) immediately.  If you are unable to reach your physician or the Resident on call, go to the nearest emergency room.  Explain that you have had a transrectal biopsy of your prostate and what problems you are experiencing.        You should attempt to urinate following your biopsy before you leave the clinic.  If you are unable to urinate four (4) to six (6) hours after you leave the clinic, you will need to contact the Urology Clinic or the Resident on call.  If you are unable to reach your physician or the Resident on call, go to the nearest emergency room.            If you have any questions or concerns after your biopsy, feel free to contact the Urology Clinic at [phone] during M-F, 8:00-5pm business hours.  If you need to speak with someone after normal business hours, call [phone] and ask for the Resident on call to be paged.            Follow-ups after your visit        Follow-up notes from your care team     Return in 1 week (on 7/30/2018) for Follow-up and results.      Future tests that were ordered for you today     Open Future Orders        Priority Expected Expires Ordered    US TRANSRECTAL Routine  7/23/2019 7/23/2018            Who to contact     Please call your clinic at [phone] to:    Ask questions about your health    Make or cancel appointments    Discuss your medicines    Learn about your test results    Speak to your doctor            Additional Information About Your Visit        [employer] Information     [employer] gives you secure access to your electronic health record.  If you see a primary care provider, you can also send messages to your care team and make appointments. If you have questions, please call your primary care clinic.  If you do not have a primary care provider, please call [phone] and they will assist you.      [employer] is an electronic gateway that provides easy, online access to your medical records. With [employer], you can request a clinic appointment, read your test results, renew a prescription or communicate with your care team.     To access your existing account, please contact your [facility] or call [phone] for assistance.        Care EveryWhere ID     This is your Care EveryWhere ID. This could be used by other organizations to access your [place] medical records  [id number]        Your Vitals Were     Pulse Height Pulse Oximetry BMI (Body Mass Index)          66 1.829 m (6') 91% 33.91 kg/m2         Blood Pressure from Last 3 Encounters:   07/23/18 118/74   05/25/18 126/78   05/15/18 120/68    Weight from Last 3 Encounters:   07/23/18 113.4 kg (250 lb)   06/28/18 113.4 kg (250 lb)   05/25/18 113.4 kg (250 lb)              We Performed the Following     BIOPSY OF PROSTATE,NEEDLE/PUNCH [48605]     INJECT NERV BLCK,OTHR PERIPH NERV [83712]     INTRAMUSCULAR/SUB-Q INJECTION     Surgical pathology exam        Primary Care Provider Office Phone # Fax #    Michael Oliver [phone] [address] [place] [address]        Equal Access to Services     JANKI MONROY : Hadii aad ku hadasho Soomaali, waaxda luqadaha, qaybta kaalmada adeegyada, drake ley. So [facility] [phone].    ATENCIÓN: Si habla ifrahañol, tiene a worley disposición servicios gratuitos de asistencia lingüística. Llame al [phone].    We comply with applicable federal civil rights laws and [place] laws. We do not discriminate on the basis of race, color, national origin, age, disability, sex,  sexual orientation, or gender identity.            Thank you!     Thank you for choosing [facility] UROLOGY AND [facility] FOR PROSTATE AND UROLOGIC CANCERS  for your care. Our goal is always to provide you with excellent care. Hearing back from our patients is one way we can continue to improve our services. Please take a few minutes to complete the written survey that you may receive in the mail after your visit with us. Thank you!             Your Updated Medication List - Protect others around you: Learn how to safely use, store and throw away your medicines at www.disposemymeds.org.          This list is accurate as of 7/23/18 10:42 AM.  Always use your most recent med list.                   Brand Name Dispense Instructions for use Diagnosis    amLODIPine 2.5 MG tablet    NORVASC    90 tablet    Take 1 tablet (2.5 mg) by mouth daily    Coronary artery disease involving native coronary artery of native heart without angina pectoris       aspirin 81 MG tablet      Take 81 mg by mouth 2 times daily        balsalazide 750 MG capsule    COLAZAL     Take 2 tablets twice daily        cetirizine 10 MG tablet    zyrTEC    30 tablet    Take 10 mg by mouth daily        ciprofloxacin 500 MG tablet    CIPRO    6 tablet    Take 1 tablet (500 mg) by mouth 2 times daily    Elevated prostate specific antigen (PSA)       HYDROcodone-acetaminophen 5-325 MG per tablet    NORCO    10 tablet    Take 1 tablet by mouth every 6 hours as needed for severe pain    Kidney stones       isosorbide mononitrate 60 MG 24 hr tablet    IMDUR    90 tablet    Take 1 tablet (60 mg) by mouth daily    Coronary artery disease involving native coronary artery of native heart, angina presence unspecified       medical cannabis liquid      (This is NOT a prescription, and does not certify that the patient has a qualifying medical condition for medical cannabis.  The purpose of this order is  to document that the patient reports taking medical cannabis.)         melatonin 3 MG tablet      Take 1 tablet (3 mg) by mouth nightly as needed for sleep        METAMUCIL SMOOTH TEXTURE 63 % Powd   Generic drug:  psyllium     1 Bottle    Take 2 teaspoonful by mouth daily Mix in 8 ounces of water        metoprolol succinate 50 MG 24 hr tablet    TOPROL-XL    90 tablet    TAKE 1 TABLET DAILY    Coronary artery disease involving native coronary artery of native heart without angina pectoris       MIRALAX powder   Generic drug:  polyethylene glycol     510 g    Take 17 g by mouth daily        nitroGLYcerin 0.4 MG/SPRAY spray    NITROLINGUAL    4.9 g    For chest pain spray 1 spray under tongue every 5 minutes for 3 doses. If symptoms persist 5 minutes after 1st dose call 911.        order for DME     1 each    Equipment being ordered: CPAP and supplies    DARYL (obstructive sleep apnea)       RANEXA 500 MG 12 hr tablet   Generic drug:  ranolazine     180 tablet    TAKE 1 TABLET EVERY 12 HOURS    Coronary artery disease involving native coronary artery of native heart without angina pectoris       rosuvastatin 20 MG tablet    CRESTOR    90 tablet    Take 1 tablet (20 mg) by mouth daily    Coronary artery disease involving native coronary artery of native heart without angina pectoris       SHINGRIX IM           temazepam 30 MG capsule    RESTORIL    15 capsule    Take 1 capsule (30 mg) by mouth nightly as needed for sleep    Insomnia, unspecified type

## 2018-07-23 NOTE — NURSING NOTE
Invasive Procedure Safety Checklist:    Procedure: MRI Fusion Biopsy- prostate    Action: Complete sections and checkboxes as appropriate.    Pre-procedure:  1. Patient ID Verified with 2 identifiers (Jazmin and  or MRN) : YES    2. Procedure and site verified with patient/designee (when able) : YES    3. Accurate consent documentation in medical record : YES    4. H&P (or appropriate assessment) documented in medical record : NO  H&P must be up to 30 days prior to procedure an updated within 24 hours of                 Procedure as applicable.     5. Relevant diagnostic and radiology test results appropriately labeled and displayed as applicable : NO    6. Blood products, implants, devices, and/or special equipment available for the procedure as applicable : NO    7. Procedure site(s) marked with provider initials [Exclusions: ] : NO    8. Marking not required. Reason : Yes  Procedure does not require site marking    Time Out:     Time-Out performed immediately prior to starting procedure, including verbal and active participation of all team members addressing: YES    1. Correct patient identity.  2. Confirmed that the correct side and site are marked.  3. An accurate procedure to be done.  4. Agreement on the procedure to be done.  5. Correct patient position.  6. Relevant images and results are properly labeled and appropriately displayed.  7. The need to administer antibiotics or fluids for irrigation purposes during the procedure as applicable.  8. Safety precautions based on patient history or medication use.    During Procedure: Verification of correct person, site, and procedure occurs any time the responsibility for care of the patient is transferred to another member of the care team.    KELECHI Johnson

## 2018-07-30 LAB — COPATH REPORT: NORMAL

## 2018-07-31 ENCOUNTER — OFFICE VISIT (OUTPATIENT)
Dept: UROLOGY | Facility: CLINIC | Age: 73
End: 2018-07-31
Payer: COMMERCIAL

## 2018-07-31 VITALS — HEART RATE: 66 BPM | HEIGHT: 72 IN | OXYGEN SATURATION: 95 % | BODY MASS INDEX: 33.86 KG/M2 | WEIGHT: 250 LBS

## 2018-07-31 DIAGNOSIS — C61 PROSTATE CANCER (H): Primary | ICD-10-CM

## 2018-07-31 PROCEDURE — 99213 OFFICE O/P EST LOW 20 MIN: CPT | Performed by: UROLOGY

## 2018-07-31 ASSESSMENT — PAIN SCALES - GENERAL: PAINLEVEL: NO PAIN (0)

## 2018-07-31 NOTE — MR AVS SNAPSHOT
After Visit Summary   7/31/2018    Hood Arizmendi    MRN: [id number]           Patient Information     Date Of Birth          1945        Visit Information        Provider Department      7/31/2018 1:30 PM William Terrell MD [facility]         Follow-ups after your visit        Follow-up notes from your care team     Return in about 6 weeks (around 9/11/2018).      Your next 10 appointments already scheduled     Sep 18, 2018  1:00 PM CDT   Return Visit with William Terrell MD   [facility] ([facility])    [address]  [address]  [facility] [address] [phone]              Who to contact     If you have questions or need follow up information about today's clinic visit or your schedule please contact [facility] UROLOGY [facility] directly at [phone].  Normal or non-critical lab and imaging results will be communicated to you by MyChart, letter or phone within 4 business days after the clinic has received the results. If you do not hear from us within 7 days, please contact the clinic through [employer] or phone. If you have a critical or abnormal lab result, we will notify you by phone as soon as possible.  Submit refill requests through [employer] or call your pharmacy and they will forward the refill request to us. Please allow 3 business days for your refill to be completed.          Additional Information About Your Visit        MyChart Information     [employer] gives you secure access to your electronic health record. If you see a primary care provider, you can also send messages to your care team and make appointments. If you have questions, please call your primary care clinic.  If you do not have a primary care provider, please call [phone] and they will assist you.        Care EveryWhere ID     This is your  Care EveryWhere ID. This could be used by other organizations to access your [place] medical records  [id number]        Your Vitals Were     Pulse Height Pulse Oximetry BMI (Body Mass Index)          66 1.829 m (6') 95% 33.91 kg/m2         Blood Pressure from Last 3 Encounters:   07/23/18 118/74   05/25/18 126/78   05/15/18 120/68    Weight from Last 3 Encounters:   07/31/18 113.4 kg (250 lb)   07/23/18 113.4 kg (250 lb)   06/28/18 113.4 kg (250 lb)              Today, you had the following     No orders found for display       Primary Care Provider Office Phone # Fax #    Michael Oliver [phone] [address] [place] [address]        Equal Access to Services     [facility]: Hadii afsaneh lugo hadasho Soomaali, waaxda luqadaha, qaybta kaalmada adeegyada, drake mcneill . So [facility] [phone].    ATENCIÓN: Si habla español, tiene a worley disposición servicios gratuitos de asistencia lingüística. Juwan al [phone].    We comply with applicable federal civil rights laws and [place] laws. We do not discriminate on the basis of race, color, national origin, age, disability, sex, sexual orientation, or gender identity.            Thank you!     Thank you for choosing [facility] [place]  for your care. Our goal is always to provide you with excellent care. Hearing back from our patients is one way we can continue to improve our services. Please take a few minutes to complete the written survey that you may receive in the mail after your visit with us. Thank you!             Your Updated Medication List - Protect others around you: Learn how to safely use, store and throw away your medicines at www.disposemymeds.org.          This list is accurate as of 7/31/18  2:36 PM.  Always use your most recent med list.                   Brand Name Dispense Instructions for use Diagnosis    amLODIPine 2.5 MG tablet    NORVASC    90  tablet    Take 1 tablet (2.5 mg) by mouth daily    Coronary artery disease involving native coronary artery of native heart without angina pectoris       aspirin 81 MG tablet      Take 81 mg by mouth 2 times daily        balsalazide 750 MG capsule    COLAZAL     Take 2 tablets twice daily        cetirizine 10 MG tablet    zyrTEC    30 tablet    Take 10 mg by mouth daily        ciprofloxacin 500 MG tablet    CIPRO    6 tablet    Take 1 tablet (500 mg) by mouth 2 times daily    Elevated prostate specific antigen (PSA)       HYDROcodone-acetaminophen 5-325 MG per tablet    NORCO    10 tablet    Take 1 tablet by mouth every 6 hours as needed for severe pain    Kidney stones       isosorbide mononitrate 60 MG 24 hr tablet    IMDUR    90 tablet    Take 1 tablet (60 mg) by mouth daily    Coronary artery disease involving native coronary artery of native heart, angina presence unspecified       medical cannabis liquid      (This is NOT a prescription, and does not certify that the patient has a qualifying medical condition for medical cannabis.  The purpose of this order is  to document that the patient reports taking medical cannabis.)        melatonin 3 MG tablet      Take 1 tablet (3 mg) by mouth nightly as needed for sleep        METAMUCIL SMOOTH TEXTURE 63 % Powd   Generic drug:  psyllium     1 Bottle    Take 2 teaspoonful by mouth daily Mix in 8 ounces of water        metoprolol succinate 50 MG 24 hr tablet    TOPROL-XL    90 tablet    TAKE 1 TABLET DAILY    Coronary artery disease involving native coronary artery of native heart without angina pectoris       MIRALAX powder   Generic drug:  polyethylene glycol     510 g    Take 17 g by mouth daily        nitroGLYcerin 0.4 MG/SPRAY spray    NITROLINGUAL    4.9 g    For chest pain spray 1 spray under tongue every 5 minutes for 3 doses. If symptoms persist 5 minutes after 1st dose call 911.        order for DME     1 each    Equipment being ordered: CPAP and supplies     DARYL (obstructive sleep apnea)       RANEXA 500 MG 12 hr tablet   Generic drug:  ranolazine     180 tablet    TAKE 1 TABLET EVERY 12 HOURS    Coronary artery disease involving native coronary artery of native heart without angina pectoris       rosuvastatin 20 MG tablet    CRESTOR    90 tablet    Take 1 tablet (20 mg) by mouth daily    Coronary artery disease involving native coronary artery of native heart without angina pectoris       SHINGRIX IM           temazepam 30 MG capsule    RESTORIL    15 capsule    Take 1 capsule (30 mg) by mouth nightly as needed for sleep    Insomnia, unspecified type

## 2018-07-31 NOTE — NURSING NOTE
Pt states he had no trouble post Bx.  Pt states after 24 hrs he barely noticed anything happened.  Pt here for bx results.  PHILL Og, CMA

## 2018-07-31 NOTE — PROGRESS NOTES
CHIEF COMPLAINT   It was my pleasure to see Hood Arizmendi who is a 73 year old male for follow-up of prostate cancer.      HPI   Hood Arizmendi is a very pleasant 73 year old male who presents with a history of new diagnosis of prostate cancer. CT1c. PSA 11.4 and Mikhail pattern 3+3 on biopsy. Doing well since biopsy.    PSA   Date Value Ref Range Status   06/26/2018 11.40 (H) 0 - 4 ug/L Final     Comment:     Assay Method:  Chemiluminescence using Siemens Vista analyzer     FINAL DIAGNOSIS:   A. Prostate, target #1, needle biopsy:   - Prostatic adenocarcinoma, acinar type   - Kayenta score 6 (3+3)   - Grade Group 1   - Extent: Involves 2/2 cores (1 mm, 10%; 9 mm, 70%)     G. Prostate, left mid, biopsy:   - Prostatic adenocarcinoma, acinar type   - Mikhail score 6 (3+3)   - Grade Group 1   - Extent: Involves 1/2 cores (3 mm, 30%)     B. Prostate, target #2, needle biopsy:   - Benign prostate tissue     C. Prostate, right base, biopsy:   - Benign prostate tissue     D. Prostate, right mid, biopsy:    - Benign prostate tissue     E. Prostate, right apex, biopsy:   - Benign prostate tissue     F. Prostate, left base, biopsy:   - Benign prostate tissue     H. Prostate, left apex, biopsy:   - Benign prostate tissue     PHYSICAL EXAM  Patient is a 73 year old  male   Vitals: Pulse 66, height 1.829 m (6'), weight 113.4 kg (250 lb), SpO2 95 %.  General Appearance Adult: Body mass index is 33.91 kg/(m^2).  Alert, no acute distress, oriented  HENT: throat/mouth:normal, good dentition  Lungs: no respiratory distress, or pursed lip breathing  Heart: No obvious jugular venous distension present  Abdomen: soft, nontender, no organomegaly or masses  Musculoskeltal: extremities normal, no peripheral edema  Skin: no suspicious lesions or rashes  Neuro: Alert, oriented, speech and mentation normal  Psych: affect and mood normal  Gait: Normal    ASSESSMENT and PLAN  73 year old male with new diagnosis of prostate cancer, cT1c, PSA  11.4 and [place] 3+3 on biopsy. We had an extensive discussion about the significance of localized, prostate cancer. We discussed the options for treatment of a localized prostate cancer including active surveillance, brachytherapy, external beam radiation therapy, and surgical removal.      Furthermore, we discussed the relative merits of robotic assisted radical prostatectomy. We also discussed the advantages and disadvantages and roles of open surgery vs. laparoscopic (and [place] assisted) surgery. The anticipated post-operative course was explained, including an anticipated 1-2 day hospital stay. Catheter will remain in place 10-14 days.      We discussed that there was no clear evidence of advantage between surgery and radiation with regard to risk of recurrence. We discussed option for discussion with radiation oncology, but patient would prefer surgery.      The risks, benefits, alternatives, and personnel involved in the procedure were discussed. Specifically, we discussed that risks include but are not limited to anesthetic complications including stroke, MI, DVT/PE, as well as risk of bleeding, bowel injury, infection, urine leak and other potentially unforseen complications. Also discussed the risk of bladder neck contracture and other urinary symptoms after procedure. In addition, we discussed risk of urinary incontinence and erectile dysfunction following the procedure. Finally, we discussed risk for adverse pathologic features, including positive surgical margins and potential for post-surgical radiation, hormone ablation and long-term need for PSA monitoring.    Majority of our discussion related to active surveillance for prostate cancer. We discussed that this is a common treatment decision for men with very low and low risk prostate cancer and is endorsed by major urologic guidelines. We dicussed the overall slow growth of most prostate cancers and the long history of date suggesting the safety  of this approach. We discussed the ProtecT trial and that this randomized study demonstrated no differences in survival for men comparing active surveillance to radiation or surgery. We discussed that surveillance typically involves PSA rechecks every 6 months with intermittent repeat MRIs and repeat biopsies. We also discussed that approximately half of men eventually pursue active treatment while on surveillance.    Patient would like to start with surveillance. He would also like to proceed with genomic testing to better characterize his cancer. Will do this and will follow-up with me in six weeks to discuss options further.       - Oncotype Dx  - 6 weeks with psa    I spent over 15 minutes with the patient.  Over half this time was spent on counseling regarding prostate cancer.    William Terrell MD  Urology  [facility]  Clinic Phone [phone]

## 2018-08-04 PROBLEM — C61 PROSTATE CANCER (H): Status: ACTIVE | Noted: 2018-08-04

## 2018-10-01 DIAGNOSIS — I25.10 CORONARY ARTERY DISEASE INVOLVING NATIVE CORONARY ARTERY OF NATIVE HEART WITHOUT ANGINA PECTORIS: ICD-10-CM

## 2018-10-01 NOTE — TELEPHONE ENCOUNTER
"Requested Prescriptions   Pending Prescriptions Disp Refills     rosuvastatin (CRESTOR) 20 MG tablet [Pharmacy Med Name: ROSUVASTATIN TABS 20MG]  Last Written Prescription Date:  05/15/2018  Last Fill Quantity: 90 tablet,  # refills: 0   Last office visit: 5/15/2018 with prescribing provider:  Michael Oliver MD   Future Office Visit:     90 tablet 0     Sig: TAKE 1 TABLET DAILY    Statins Protocol Passed    10/1/2018  1:32 PM       Passed - LDL on file in past 12 months    Recent Labs   Lab Test  05/15/18   1043   LDL  61            Passed - No abnormal creatine kinase in past 12 months    No lab results found.            Passed - Recent (12 mo) or future (30 days) visit within the authorizing provider's specialty    Patient had office visit in the last 12 months or has a visit in the next 30 days with authorizing provider or within the authorizing provider's specialty.  See \"Patient Info\" tab in inbasket, or \"Choose Columns\" in Meds & Orders section of the refill encounter.           Passed - Patient is age 18 or older        RANEXA 500 MG 12 hr tablet [Pharmacy Med Name: RANEXA TABS 500MG]  Last Written Prescription Date:  08/02/2017  Last Fill Quantity: 180 tablet,  # refills: 3   Last office visit: 5/15/2018 with prescribing provider:  Michael Oliver MD   Future Office Visit:     180 tablet 3     Sig: TAKE 1 TABLET EVERY 12 HOURS    There is no refill protocol information for this order          Routing refill request to provider for review/approval because:  Drug not on the [facility] [place], P or [facility] refill protocol or controlled substance      "

## 2018-10-04 RX ORDER — ROSUVASTATIN CALCIUM 20 MG/1
TABLET, COATED ORAL
Qty: 90 TABLET | Refills: 2 | Status: SHIPPED | OUTPATIENT
Start: 2018-10-04 | End: 2019-07-22

## 2018-10-04 RX ORDER — RANOLAZINE 500 MG/1
TABLET, FILM COATED, EXTENDED RELEASE ORAL
Qty: 180 TABLET | Refills: 2 | Status: SHIPPED | OUTPATIENT
Start: 2018-10-04 | End: 2019-07-19

## 2018-10-04 NOTE — TELEPHONE ENCOUNTER
Prescription approved per [facility] Refill Protocol.    eJssica Gee RN

## 2018-10-10 ENCOUNTER — TRANSFERRED RECORDS (OUTPATIENT)
Dept: HEALTH INFORMATION MANAGEMENT | Facility: CLINIC | Age: 73
End: 2018-10-10

## 2018-12-24 ENCOUNTER — TRANSFERRED RECORDS (OUTPATIENT)
Dept: HEALTH INFORMATION MANAGEMENT | Facility: CLINIC | Age: 73
End: 2018-12-24

## 2018-12-26 DIAGNOSIS — I25.10 CORONARY ARTERY DISEASE INVOLVING NATIVE CORONARY ARTERY OF NATIVE HEART WITHOUT ANGINA PECTORIS: ICD-10-CM

## 2018-12-26 DIAGNOSIS — I25.10 CORONARY ARTERY DISEASE INVOLVING NATIVE CORONARY ARTERY OF NATIVE HEART, ANGINA PRESENCE UNSPECIFIED: ICD-10-CM

## 2018-12-26 RX ORDER — ISOSORBIDE MONONITRATE 60 MG/1
60 TABLET, EXTENDED RELEASE ORAL DAILY
Qty: 90 TABLET | Refills: 1 | Status: SHIPPED | OUTPATIENT
Start: 2018-12-26 | End: 2019-07-18

## 2018-12-26 RX ORDER — AMLODIPINE BESYLATE 2.5 MG/1
2.5 TABLET ORAL DAILY
Qty: 90 TABLET | Refills: 1 | Status: SHIPPED | OUTPATIENT
Start: 2018-12-26 | End: 2019-07-18

## 2019-05-14 ENCOUNTER — TRANSFERRED RECORDS (OUTPATIENT)
Dept: HEALTH INFORMATION MANAGEMENT | Facility: CLINIC | Age: 74
End: 2019-05-14

## 2019-05-20 ENCOUNTER — TRANSFERRED RECORDS (OUTPATIENT)
Dept: HEALTH INFORMATION MANAGEMENT | Facility: CLINIC | Age: 74
End: 2019-05-20

## 2019-07-18 DIAGNOSIS — I25.10 CORONARY ARTERY DISEASE INVOLVING NATIVE CORONARY ARTERY OF NATIVE HEART WITHOUT ANGINA PECTORIS: ICD-10-CM

## 2019-07-18 NOTE — TELEPHONE ENCOUNTER
Requested Prescriptions   Pending Prescriptions Disp Refills     ranolazine (RANEXA) 500 MG 12 hr tablet [Pharmacy Med Name: RANOLAZINE ER TABS 500MG]        Last Written Prescription Date:  3/17/2017  Last Fill Quantity: 180,   # refills: 1  Last Office Visit: 5/15/2018  Future Office visit:       Routing refill request to provider for review/approval because:  Drug not on the G, P or [facility] refill protocol or controlled substance   180 tablet 2     Sig: TAKE 1 TABLET EVERY 12 HOURS       There is no refill protocol information for this order

## 2019-07-18 NOTE — TELEPHONE ENCOUNTER
"Requested Prescriptions   Pending Prescriptions Disp Refills     metoprolol succinate ER (TOPROL-XL) 50 MG 24 hr tablet [Pharmacy Med Name: METOPROLOL SUCC ER TAB 50MG]  Last Written Prescription Date:  05/10/2018  Last Fill Quantity: 90 tablet,  # refills: 3   Last Office Visit: 5/15/2018 Michael Oliver MD   Future Office Visit:      90 tablet 3     Sig: TAKE 1 TABLET DAILY       Beta-Blockers Protocol Failed - 7/18/2019 12:20 PM        Failed - Recent (12 mo) or future (30 days) visit within the authorizing provider's specialty     Patient had office visit in the last 12 months or has a visit in the next 30 days with authorizing provider or within the authorizing provider's specialty.  See \"Patient Info\" tab in inbasket, or \"Choose Columns\" in Meds & Orders section of the refill encounter.              Passed - Blood pressure under 140/90 in past 12 months     BP Readings from Last 3 Encounters:   07/23/18 118/74   05/25/18 126/78   05/15/18 120/68                 Passed - Patient is age 6 or older        Passed - Medication is active on med list        rosuvastatin (CRESTOR) 20 MG tablet [Pharmacy Med Name: ROSUVASTATIN TABS 20MG]  Last Written Prescription Date:  10/04/2018  Last Fill Quantity: 90 tablet,  # refills: 2   Last Office Visit: 5/15/2018   Future Office Visit:      90 tablet 2     Sig: TAKE 1 TABLET DAILY       Statins Protocol Failed - 7/18/2019 12:20 PM        Failed - LDL on file in past 12 months     Recent Labs   Lab Test 05/15/18  1043   LDL 61             Failed - Recent (12 mo) or future (30 days) visit within the authorizing provider's specialty     Patient had office visit in the last 12 months or has a visit in the next 30 days with authorizing provider or within the authorizing provider's specialty.  See \"Patient Info\" tab in inbasket, or \"Choose Columns\" in Meds & Orders section of the refill encounter.              Passed - No abnormal creatine kinase in past 12 months     No lab " results found.             Passed - Medication is active on med list        Passed - Patient is age 18 or older

## 2019-07-19 ENCOUNTER — TELEPHONE (OUTPATIENT)
Dept: PEDIATRICS | Facility: CLINIC | Age: 74
End: 2019-07-19

## 2019-07-19 DIAGNOSIS — I25.10 CORONARY ARTERY DISEASE INVOLVING NATIVE CORONARY ARTERY OF NATIVE HEART WITHOUT ANGINA PECTORIS: ICD-10-CM

## 2019-07-19 RX ORDER — RANOLAZINE 500 MG/1
TABLET, EXTENDED RELEASE ORAL
Qty: 180 TABLET | Refills: 0 | Status: SHIPPED | OUTPATIENT
Start: 2019-07-19 | End: 2019-09-20

## 2019-07-19 RX ORDER — RANOLAZINE 500 MG/1
TABLET, EXTENDED RELEASE ORAL
Qty: 90 TABLET | Refills: 0 | Status: SHIPPED | OUTPATIENT
Start: 2019-07-19 | End: 2019-09-20

## 2019-07-19 NOTE — TELEPHONE ENCOUNTER
Pt is out of heart med, Ranexa 500 mg.  Would Dr Oliver be able to give 10-12 pills refill to [facility]? Pt gets refills from [facility], ordered but won't get until next week.  Please advise.  Please call pt at [phone].  Thank you.  ani

## 2019-07-19 NOTE — TELEPHONE ENCOUNTER
Medication is being filled for 1 time refill only due to:  Patient needs to be seen because due for appointment.     Called patient, informed him he is due for appointment with Dr. Oliver.    Aria Boswell RN BSN   [facility]

## 2019-07-22 ENCOUNTER — TELEPHONE (OUTPATIENT)
Dept: PEDIATRICS | Facility: CLINIC | Age: 74
End: 2019-07-22

## 2019-07-22 ENCOUNTER — TRANSFERRED RECORDS (OUTPATIENT)
Dept: HEALTH INFORMATION MANAGEMENT | Facility: CLINIC | Age: 74
End: 2019-07-22

## 2019-07-22 DIAGNOSIS — I25.10 CORONARY ARTERY DISEASE INVOLVING NATIVE CORONARY ARTERY OF NATIVE HEART WITHOUT ANGINA PECTORIS: ICD-10-CM

## 2019-07-22 RX ORDER — ROSUVASTATIN CALCIUM 20 MG/1
20 TABLET, COATED ORAL DAILY
Qty: 90 TABLET | Refills: 0 | Status: SHIPPED | OUTPATIENT
Start: 2019-07-22 | End: 2019-09-20

## 2019-07-22 RX ORDER — METOPROLOL SUCCINATE 50 MG/1
TABLET, EXTENDED RELEASE ORAL
Qty: 90 TABLET | Refills: 3 | OUTPATIENT
Start: 2019-07-22

## 2019-07-22 RX ORDER — ROSUVASTATIN CALCIUM 20 MG/1
TABLET, COATED ORAL
Qty: 90 TABLET | Refills: 2 | OUTPATIENT
Start: 2019-07-22

## 2019-07-22 RX ORDER — METOPROLOL SUCCINATE 50 MG/1
50 TABLET, EXTENDED RELEASE ORAL DAILY
Qty: 90 TABLET | Refills: 0 | Status: SHIPPED | OUTPATIENT
Start: 2019-07-22 | End: 2019-09-20

## 2019-07-22 NOTE — TELEPHONE ENCOUNTER
Reason for Call:  Same Day Appointment, Requested Provider:  Michael Oliver MD    PCP: Michael Oliver    Reason for visit: AWV and med check for heart meds     Duration of symptoms: sched for 8/23/19 but would like to be seen sooner if work-in is possible. Not avbl on 8/5/19 or 8/15/19 but other days will work if HUDDLE blocks are an option or during complex care week?    Have you been treated for this in the past? Yes    Additional comments: Please refill pended Rx requests pending upcoming AWV appt and call [facility] back to adv if earlier appt options are avbl. I have also placed on waitlist    Can we leave a detailed message on this number? YES    Phone number patient can be reached at: Home number on file [phone] (home)    Best Time:     Call taken on 7/22/2019 at 9:03 AM by Ursula Mckeon

## 2019-07-22 NOTE — TELEPHONE ENCOUNTER
Spoke with pt.  He is requesting a refill of his metoprolol and rosuvastatin.  He was not able to get an appt until 9/20/19 with .    Prescription approved per [facility] Refill Protocol.    Jessica Gee RN - [facility]

## 2019-07-31 DIAGNOSIS — I25.10 CORONARY ARTERY DISEASE INVOLVING NATIVE CORONARY ARTERY OF NATIVE HEART, ANGINA PRESENCE UNSPECIFIED: ICD-10-CM

## 2019-07-31 DIAGNOSIS — I25.10 CORONARY ARTERY DISEASE INVOLVING NATIVE CORONARY ARTERY OF NATIVE HEART WITHOUT ANGINA PECTORIS: ICD-10-CM

## 2019-08-02 RX ORDER — AMLODIPINE BESYLATE 2.5 MG/1
TABLET ORAL
Qty: 90 TABLET | Refills: 0 | Status: SHIPPED | OUTPATIENT
Start: 2019-08-02 | End: 2019-09-20

## 2019-08-02 RX ORDER — ISOSORBIDE MONONITRATE 60 MG/1
TABLET, EXTENDED RELEASE ORAL
Qty: 90 TABLET | Refills: 0 | Status: SHIPPED | OUTPATIENT
Start: 2019-08-02 | End: 2019-09-20

## 2019-08-23 ENCOUNTER — TRANSFERRED RECORDS (OUTPATIENT)
Dept: HEALTH INFORMATION MANAGEMENT | Facility: CLINIC | Age: 74
End: 2019-08-23

## 2019-09-17 ASSESSMENT — ENCOUNTER SYMPTOMS
FEVER: 0
DIZZINESS: 0
ARTHRALGIAS: 0
SORE THROAT: 0
COUGH: 0
NAUSEA: 0
WEAKNESS: 0
ABDOMINAL PAIN: 0
HEMATOCHEZIA: 0
DYSURIA: 0
PARESTHESIAS: 0
PALPITATIONS: 0
CHILLS: 0
SHORTNESS OF BREATH: 0
MYALGIAS: 0
HEADACHES: 0
FREQUENCY: 0
JOINT SWELLING: 0
HEMATURIA: 0
EYE PAIN: 0
NERVOUS/ANXIOUS: 0
DIARRHEA: 0
CONSTIPATION: 0
HEARTBURN: 0

## 2019-09-17 ASSESSMENT — ACTIVITIES OF DAILY LIVING (ADL): CURRENT_FUNCTION: NO ASSISTANCE NEEDED

## 2019-09-20 ENCOUNTER — OFFICE VISIT (OUTPATIENT)
Dept: PEDIATRICS | Facility: CLINIC | Age: 74
End: 2019-09-20
Payer: COMMERCIAL

## 2019-09-20 VITALS
DIASTOLIC BLOOD PRESSURE: 60 MMHG | HEART RATE: 66 BPM | BODY MASS INDEX: 32.34 KG/M2 | HEIGHT: 73 IN | OXYGEN SATURATION: 95 % | SYSTOLIC BLOOD PRESSURE: 98 MMHG | WEIGHT: 244 LBS

## 2019-09-20 DIAGNOSIS — N20.0 KIDNEY STONES: ICD-10-CM

## 2019-09-20 DIAGNOSIS — I25.10 CORONARY ARTERY DISEASE INVOLVING NATIVE CORONARY ARTERY OF NATIVE HEART WITHOUT ANGINA PECTORIS: ICD-10-CM

## 2019-09-20 DIAGNOSIS — I25.10 CORONARY ARTERY DISEASE INVOLVING NATIVE CORONARY ARTERY OF NATIVE HEART, ANGINA PRESENCE UNSPECIFIED: ICD-10-CM

## 2019-09-20 DIAGNOSIS — Z00.00 ENCOUNTER FOR MEDICARE ANNUAL WELLNESS EXAM: ICD-10-CM

## 2019-09-20 DIAGNOSIS — G47.00 INSOMNIA, UNSPECIFIED TYPE: ICD-10-CM

## 2019-09-20 DIAGNOSIS — N52.9 ERECTILE DYSFUNCTION, UNSPECIFIED ERECTILE DYSFUNCTION TYPE: ICD-10-CM

## 2019-09-20 DIAGNOSIS — Z00.00 ENCOUNTER FOR ROUTINE ADULT HEALTH EXAMINATION WITHOUT ABNORMAL FINDINGS: Primary | ICD-10-CM

## 2019-09-20 PROCEDURE — G0438 PPPS, INITIAL VISIT: HCPCS | Performed by: INTERNAL MEDICINE

## 2019-09-20 RX ORDER — SILDENAFIL CITRATE 20 MG/1
40 TABLET ORAL DAILY PRN
Qty: 30 TABLET | Refills: 11 | Status: SHIPPED | OUTPATIENT
Start: 2019-09-20 | End: 2020-05-01

## 2019-09-20 RX ORDER — METOPROLOL SUCCINATE 50 MG/1
50 TABLET, EXTENDED RELEASE ORAL DAILY
Qty: 90 TABLET | Refills: 3 | Status: SHIPPED | OUTPATIENT
Start: 2019-09-20 | End: 2020-08-07

## 2019-09-20 RX ORDER — RANOLAZINE 500 MG/1
TABLET, EXTENDED RELEASE ORAL
Qty: 180 TABLET | Refills: 3 | Status: SHIPPED | OUTPATIENT
Start: 2019-09-20 | End: 2020-08-07

## 2019-09-20 RX ORDER — HYDROCODONE BITARTRATE AND ACETAMINOPHEN 5; 325 MG/1; MG/1
1 TABLET ORAL EVERY 6 HOURS PRN
Qty: 10 TABLET | Refills: 0 | Status: SHIPPED | OUTPATIENT
Start: 2019-09-20 | End: 2019-11-21

## 2019-09-20 RX ORDER — TEMAZEPAM 30 MG
30 CAPSULE ORAL
Qty: 15 CAPSULE | Refills: 0 | Status: SHIPPED | OUTPATIENT
Start: 2019-09-20 | End: 2020-05-01

## 2019-09-20 RX ORDER — TAMSULOSIN HYDROCHLORIDE 0.4 MG/1
0.4 CAPSULE ORAL DAILY
COMMUNITY
Start: 2019-09-20 | End: 2020-12-15

## 2019-09-20 RX ORDER — AMLODIPINE BESYLATE 2.5 MG/1
2.5 TABLET ORAL DAILY
Qty: 90 TABLET | Refills: 3 | Status: SHIPPED | OUTPATIENT
Start: 2019-09-20 | End: 2020-08-07

## 2019-09-20 RX ORDER — ROSUVASTATIN CALCIUM 20 MG/1
20 TABLET, COATED ORAL DAILY
Qty: 90 TABLET | Refills: 3 | Status: SHIPPED | OUTPATIENT
Start: 2019-09-20 | End: 2020-08-07

## 2019-09-20 RX ORDER — ISOSORBIDE MONONITRATE 60 MG/1
60 TABLET, EXTENDED RELEASE ORAL DAILY
Qty: 90 TABLET | Refills: 3 | Status: SHIPPED | OUTPATIENT
Start: 2019-09-20 | End: 2020-08-07

## 2019-09-20 ASSESSMENT — ENCOUNTER SYMPTOMS
PALPITATIONS: 0
ABDOMINAL PAIN: 0
HEMATURIA: 0
CONSTIPATION: 0
COUGH: 0
MYALGIAS: 0
SORE THROAT: 0
PARESTHESIAS: 0
SHORTNESS OF BREATH: 0
EYE PAIN: 0
FEVER: 0
FREQUENCY: 0
HEMATOCHEZIA: 0
DYSURIA: 0
HEADACHES: 0
DIARRHEA: 0
ARTHRALGIAS: 0
JOINT SWELLING: 0
HEARTBURN: 0
NAUSEA: 0
NERVOUS/ANXIOUS: 0
WEAKNESS: 0
CHILLS: 0
DIZZINESS: 0

## 2019-09-20 ASSESSMENT — ACTIVITIES OF DAILY LIVING (ADL): CURRENT_FUNCTION: NO ASSISTANCE NEEDED

## 2019-09-20 ASSESSMENT — MIFFLIN-ST. JEOR: SCORE: 1910.66

## 2019-09-20 NOTE — PROGRESS NOTES
"SUBJECTIVE:   Hood Arizmendi is a 74 year old male who presents for Preventive Visit.      Are you in the first 12 months of your Medicare coverage?  No    Healthy Habits:     In general, how would you rate your overall health?  Good    Frequency of exercise:  1 day/week    Duration of exercise:  15-30 minutes    Do you usually eat at least 4 servings of fruit and vegetables a day, include whole grains    & fiber and avoid regularly eating high fat or \"junk\" foods?  No    Taking medications regularly:  Yes    Medication side effects:  None    Ability to successfully perform activities of daily living:  No assistance needed    Home Safety:  Throw rugs in the hallway and lack of grab bars in the bathroom    Hearing Impairment:  Difficulty following a conversation in a noisy restaurant or crowded room    In the past 6 months, have you been bothered by leaking of urine?  No    In general, how would you rate your overall mental or emotional health?  Excellent      PHQ-2 Total Score: 0    Additional concerns today:  Yes    Do you feel safe in your environment? Yes    Do you have a Health Care Directive? Yes: Advance Directive has been received and scanned.    Has been doing xrt; finished (for his prostate cancer).  No further treatments to be given; is on flomax (tamsulosin).      Has been seeing eye specialists for glaucoma and macular degeneration.  Is to have monthly injections coming up.     WOuld like to discuss:  Scalp itches and face and shoulder skin itch at times.     Was on medical cannibis for his colitis and sleeping. Would like to substitute temazepam when he travels this spring.       Kidney stones;  Wondering about having some hydrocodone/acetaminophen on hand.      Would like some viagra for his erectile dysfunction: less firm than he'd like.       Fall risk  Fallen 2 or more times in the past year?: No  Any fall with injury in the past year?: No    Cognitive Screening   1) Repeat 3 items (Leader, Season, " Table)    2) Clock draw: NORMAL  3) 3 item recall: Recalls 2 objects   Results: ABNORMAL clock, 1-2 items recalled: PROBABLE COGNITIVE IMPAIRMENT, **INFORM PROVIDER**    Mini-CogTM Copyright BETH Pepe. Licensed by the author for use in [facility] [employer]; reprinted with permission (harmony@[facility]). All rights reserved.      Do you have sleep apnea, excessive snoring or daytime drowsiness?: yes    Reviewed and updated as needed this visit by clinical staff  Tobacco  Allergies  Med Hx  Surg Hx  Fam Hx  Soc Hx        Reviewed and updated as needed this visit by Provider        Social History     Tobacco Use     Smoking status: Never Smoker     Smokeless tobacco: Never Used   Substance Use Topics     Alcohol use: Yes     Alcohol/week: 4.2 oz     Types: 7 Standard drinks or equivalent per week     Comment: 1 per day     If you drink alcohol do you typically have >3 drinks per day or >7 drinks per week? Not applicable    Alcohol Use 9/17/2019   Prescreen: >3 drinks/day or >7 drinks/week? No   Prescreen: >3 drinks/day or >7 drinks/week? -   No flowsheet data found.            Current providers sharing in care for this patient include:   Patient Care Team:  Michael Oliver MD as PCP - General (Internal Medicine)  Michael Oliver MD as Assigned PCP  William Terrell MD as MD (Urology)    The following health maintenance items are reviewed in Epic and correct as of today:  Health Maintenance   Topic Date Due     ANNUAL REVIEW OF HM ORDERS  1945     MEDICARE ANNUAL WELLNESS VISIT  05/15/2019     FALL RISK ASSESSMENT  05/15/2019     INFLUENZA VACCINE (1) 09/01/2019     ADVANCE CARE PLANNING  06/14/2022     LIPID  05/15/2023     DTAP/TDAP/TD IMMUNIZATION (3 - Td) 05/15/2028     COLONOSCOPY  10/10/2028     HEPATITIS C SCREENING  Completed     PHQ-2  Completed     PNEUMOCOCCAL IMMUNIZATION 65+ LOW/MEDIUM RISK  Completed     ZOSTER IMMUNIZATION  Completed     AORTIC ANEURYSM SCREENING (SYSTEM ASSIGNED)   Completed     IPV IMMUNIZATION  Aged Out     MENINGITIS IMMUNIZATION  Aged Out     Lab work is in process  Labs reviewed in EPIC  BP Readings from Last 3 Encounters:   09/20/19 98/60   07/23/18 118/74   05/25/18 126/78    Wt Readings from Last 3 Encounters:   09/20/19 110.7 kg (244 lb)   07/31/18 113.4 kg (250 lb)   07/23/18 113.4 kg (250 lb)                  Patient Active Problem List   Diagnosis     Ulcerative colitis (H)     Hyperlipidemia with target LDL less than 100     DARYL (obstructive sleep apnea)     Kidney stones     Seasonal allergic rhinitis     Vitamin D deficiency     Carotid artery stenosis     Insomnia, unspecified type     Essential hypertension     Coronary artery disease involving native coronary artery of native heart without angina pectoris     ACP (advance care planning)     Elevated prostate specific antigen (PSA)     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), wet (H)     Prostate cancer (H)     Past Surgical History:   Procedure Laterality Date     APPENDECTOMY       CORONARY ANGIOGRAPHY ADULT ORDER  2011, 2016    med tx, small vessels     CYSTOSCOPY       EYE SURGERY  03/2017    retinal detachment      SURGICAL HISTORY OF -       tonsils     SURGICAL HISTORY OF -       cystoscopy for kidney stones     SURGICAL HISTORY OF -       nasal surgery       Social History     Tobacco Use     Smoking status: Never Smoker     Smokeless tobacco: Never Used   Substance Use Topics     Alcohol use: Yes     Alcohol/week: 4.2 oz     Types: 7 Standard drinks or equivalent per week     Comment: 1 per day     Family History   Problem Relation Age of Onset     C.A.D. Mother      C.A.D. Maternal Grandfather      Cancer Father         lung cancer, smoker.          Current Outpatient Medications   Medication Sig Dispense Refill     amLODIPine (NORVASC) 2.5 MG tablet Take 1 tablet (2.5 mg) by mouth daily 90 tablet 3     aspirin 81 MG tablet Take 81 mg by mouth 2 times daily       balsalazide (COLAZAL) 750 MG capsule  Take 2 tablets twice daily       cetirizine (ZYRTEC) 10 MG tablet Take 10 mg by mouth daily  30 tablet 1     HYDROcodone-acetaminophen (NORCO) 5-325 MG tablet Take 1 tablet by mouth every 6 hours as needed for severe pain 10 tablet 0     isosorbide mononitrate (IMDUR) 60 MG 24 hr tablet Take 1 tablet (60 mg) by mouth daily 90 tablet 3     medical cannabis liquid (This is NOT a prescription, and does not certify that the patient has a qualifying medical condition for medical cannabis.  The purpose of this order is  to document that the patient reports taking medical cannabis.)       melatonin 3 MG tablet Take 1 tablet (3 mg) by mouth nightly as needed for sleep       metoprolol succinate ER (TOPROL-XL) 50 MG 24 hr tablet Take 1 tablet (50 mg) by mouth daily 90 tablet 3     nitroglycerin (NITROLINGUAL) 0.4 MG/SPRAY spray For chest pain spray 1 spray under tongue every 5 minutes for 3 doses. If symptoms persist 5 minutes after 1st dose call 911. 4.9 g 0     polyethylene glycol (MIRALAX) powder Take 17 g by mouth daily 510 g 1     psyllium (METAMUCIL SMOOTH TEXTURE) 63 % POWD Take 2 teaspoonful by mouth daily Mix in 8 ounces of water 1 Bottle 11     ranolazine (RANEXA) 500 MG 12 hr tablet TAKE 1 TABLET EVERY 12 HOURS 180 tablet 3     rosuvastatin (CRESTOR) 20 MG tablet Take 1 tablet (20 mg) by mouth daily 90 tablet 3     sildenafil (REVATIO) 20 MG tablet Take 2 tablets (40 mg) by mouth daily as needed (ED) Do not take on days you've taken flomax (tamsulosin) or isosorbid 30 tablet 11     tamsulosin (FLOMAX) 0.4 MG capsule Take 1 capsule (0.4 mg) by mouth daily       temazepam (RESTORIL) 30 MG capsule Take 1 capsule (30 mg) by mouth nightly as needed for sleep 15 capsule 0     ORDER FOR DME Equipment being ordered: CPAP and supplies 1 each 0     Allergies   Allergen Reactions     Sulfa Drugs Hives         Review of Systems   Constitutional: Negative for chills and fever.   HENT: Negative for congestion, ear pain, hearing  "loss and sore throat.    Eyes: Positive for visual disturbance. Negative for pain.   Respiratory: Negative for cough and shortness of breath.    Cardiovascular: Negative for chest pain, palpitations and peripheral edema.   Gastrointestinal: Negative for abdominal pain, constipation, diarrhea, heartburn, hematochezia and nausea.   Genitourinary: Positive for impotence and urgency. Negative for discharge, dysuria, frequency, genital sores and hematuria.   Musculoskeletal: Negative for arthralgias, joint swelling and myalgias.   Skin: Negative for rash.   Neurological: Negative for dizziness, weakness, headaches and paresthesias.   Psychiatric/Behavioral: Negative for mood changes. The patient is not nervous/anxious.      CONSTITUTIONAL: NEGATIVE for fever, chills, change in weight  INTEGUMENTARY/SKIN: NEGATIVE for worrisome rashes, moles or lesions  EYES: NEGATIVE for vision changes or irritation  ENT/MOUTH: NEGATIVE for ear, mouth and throat problems  RESP: NEGATIVE for significant cough or SOB  BREAST: NEGATIVE for masses, tenderness or discharge  CV: NEGATIVE for chest pain, palpitations or peripheral edema  GI: NEGATIVE for nausea, abdominal pain, heartburn, or change in bowel habits  : NEGATIVE for frequency, dysuria, or hematuria  MUSCULOSKELETAL: NEGATIVE for significant arthralgias or myalgia  NEURO: NEGATIVE for weakness, dizziness or paresthesias  ENDOCRINE: NEGATIVE for temperature intolerance, skin/hair changes  HEME: NEGATIVE for bleeding problems  PSYCHIATRIC: NEGATIVE for changes in mood or affect    OBJECTIVE:   BP 98/60 (BP Location: Right arm, Patient Position: Sitting)   Pulse 66   Ht 1.854 m (6' 1\")   Wt 110.7 kg (244 lb)   BMI 32.19 kg/m   Estimated body mass index is 32.19 kg/m  as calculated from the following:    Height as of this encounter: 1.854 m (6' 1\").    Weight as of this encounter: 110.7 kg (244 lb).  Physical Exam  GENERAL: healthy, alert and no distress  EYES: Eyes grossly " normal to inspection, PERRL and conjunctivae and sclerae normal  HENT: ear canals and TM's normal, nose and mouth without ulcers or lesions  NECK: no adenopathy, no asymmetry, masses, or scars and thyroid normal to palpation  RESP: lungs clear to auscultation - no rales, rhonchi or wheezes  CV: regular rate and rhythm, normal S1 S2, no S3 or S4, no murmur, click or rub, no peripheral edema and peripheral pulses strong  ABDOMEN: soft, nontender, no hepatosplenomegaly, no masses and bowel sounds normal  MS: no gross musculoskeletal defects noted, no edema  SKIN: no suspicious lesions or rashes  NEURO: Normal strength and tone, mentation intact and speech normal  PSYCH: mentation appears normal, affect normal/bright    Diagnostic Test Results:  Labs reviewed in Epic    ASSESSMENT / PLAN:   1. Kidney stones  Refilled for as needed use on upcoming trip only.   - HYDROcodone-acetaminophen (NORCO) 5-325 MG tablet; Take 1 tablet by mouth every 6 hours as needed for severe pain  Dispense: 10 tablet; Refill: 0    2. Insomnia, unspecified type  As above.   - temazepam (RESTORIL) 30 MG capsule; Take 1 capsule (30 mg) by mouth nightly as needed for sleep  Dispense: 15 capsule; Refill: 0    3. Coronary artery disease involving native coronary artery of native heart without angina pectoris  Secondary risk factor modification.    - amLODIPine (NORVASC) 2.5 MG tablet; Take 1 tablet (2.5 mg) by mouth daily  Dispense: 90 tablet; Refill: 3  - metoprolol succinate ER (TOPROL-XL) 50 MG 24 hr tablet; Take 1 tablet (50 mg) by mouth daily  Dispense: 90 tablet; Refill: 3  - ranolazine (RANEXA) 500 MG 12 hr tablet; TAKE 1 TABLET EVERY 12 HOURS  Dispense: 180 tablet; Refill: 3  - rosuvastatin (CRESTOR) 20 MG tablet; Take 1 tablet (20 mg) by mouth daily  Dispense: 90 tablet; Refill: 3    4. Coronary artery disease involving native coronary artery of native heart, angina presence unspecified  Parameters well controlled.  Continue secondary risk  "factor modification for BP, cholesterol, anticoagulation, and smoking cessation.   - isosorbide mononitrate (IMDUR) 60 MG 24 hr tablet; Take 1 tablet (60 mg) by mouth daily  Dispense: 90 tablet; Refill: 3    5. Encounter for routine adult health examination without abnormal findings  Discussed diet, exercise, testicular self exam, blood pressure, cholesterol, and need for cancer surveillance at appropriate ages.   - Comprehensive metabolic panel  - Lipid panel reflex to direct LDL Fasting  - CBC with platelets and differential    6. Erectile dysfunction, unspecified erectile dysfunction type  Patient desires treatment.  Dicussed risks and benefits; he will stop his imdur and his flomax (tamsulosin) on the AM he plans to have sex at night, and start only with a 20 mg dose.   - sildenafil (REVATIO) 20 MG tablet; Take 2 tablets (40 mg) by mouth daily as needed (ED) Do not take on days you've taken flomax (tamsulosin) or isosorbid  Dispense: 30 tablet; Refill: 11        COUNSELING:  Reviewed preventive health counseling, as reflected in patient instructions       Regular exercise       Healthy diet/nutrition       Vision screening       Hearing screening       Colon cancer screening       Prostate cancer screening    Estimated body mass index is 32.19 kg/m  as calculated from the following:    Height as of this encounter: 1.854 m (6' 1\").    Weight as of this encounter: 110.7 kg (244 lb).    Weight management plan: Patient was referred to their PCP to discuss a diet and exercise plan.     reports that he has never smoked. He has never used smokeless tobacco.      Appropriate preventive services were discussed with this patient, including applicable screening as appropriate for cardiovascular disease, diabetes, osteopenia/osteoporosis, and glaucoma.  As appropriate for age/gender, discussed screening for colorectal cancer, prostate cancer, breast cancer, and cervical cancer. Checklist reviewing preventive services " available has been given to the patient.    Reviewed patients plan of care and provided an AVS. The Complex Care Plan (for patients with higher acuity and needing more deliberate coordination of services) for [place] meets the Care Plan requirement. This Care Plan has been established and reviewed with the Patient.    Counseling Resources:  ATP IV Guidelines  Pooled Cohorts Equation Calculator  Breast Cancer Risk Calculator  FRAX Risk Assessment  ICSI Preventive Guidelines  Dietary Guidelines for Americans, 2010  [employer] MyPlate  ASA Prophylaxis  Lung CA Screening    Michael Oliver MD  [facility]    Identified Health Risks:    He is at risk for lack of exercise and has been provided with information to increase physical activity for the benefit of his well-being.  The patient was counseled and encouraged to consider modifying their diet and eating habits. He was provided with information on recommended healthy diet options.  The patient was provided with written information regarding signs of hearing loss.

## 2019-09-20 NOTE — PATIENT INSTRUCTIONS
Get flu shot next week.    Set up fasting blood work for 1-2 weeks.    If we start as needed viagra, take only 1 tab;  may not take with isosorbide and flomax (tamsulosin).     I filled a prescription for hydrocodone/acetaminophen and for temazepam to take with travel.       Patient Education   Personalized Prevention Plan  You are due for the preventive services outlined below.  Your care team is available to assist you in scheduling these services.  If you have already completed any of these items, please share that information with your care team to update in your medical record.  Health Maintenance Due   Topic Date Due     ANNUAL REVIEW OF HM ORDERS  1945     Annual Wellness Visit  05/15/2019     FALL RISK ASSESSMENT  05/15/2019     Flu Vaccine (1) 09/01/2019       Exercise for a Healthier Heart     Exercise with a friend. When activity is fun, you're more likely to stick with it.   You may wonder how you can improve the health of your heart. If you re thinking about exercise, you re on the right track. You don t need to become an athlete, but you do need a certain amount of brisk exercise to help strengthen your heart. If you have been diagnosed with a heart condition, your doctor may recommend exercise to help stabilize your condition. To help make exercise a habit, choose safe, fun activities.  Be sure to check with your healthcare provider before starting an exercise program.   Why exercise?  Exercising regularly offers many healthy rewards. It can help you do all of the following:    Improve your blood cholesterol level to help prevent further heart trouble    Lower your blood pressure to help prevent a stroke or heart attack    Control diabetes, or reduce your risk of getting this disease    Improve your heart and lung function    Reach and maintain a healthy weight    Make your muscles stronger and more limber so you can stay active    Prevent falls and fractures by slowing the loss of bone mass  (osteoporosis)    Manage stress better    Reduce your blood pressure    Improve your sense of self and your body image  Exercise tips  Ease into your routine. Set small goals. Then build on them.  Exercise on most days. Aim for a total of 150 or more minutes of moderate to  vigorous intensity activity each week. Consider 40 minutes, 3 to 4 times a week. For best results, activity should last for 40 minutes on average. It is OK to work up to the 40 minute period over time. Examples of moderate-intensity activity is walking 1 mile in 15 minutes or 30 to 45 minutes of yard work.  Step up your daily activity level. Along with your exercise program, try being more active throughout the day. Walk instead of drive. Do more household tasks or yard work.  Choose one or more activities you enjoy. Walking is one of the easiest things you can do. You can also try swimming, riding a bike, dancing, or taking an exercise class.  Stop exercising and call your doctor if you:    Have chest pain or feel dizzy or lightheaded    Feel burning, tightness, pressure, or heaviness in your chest, neck, shoulders, back, or arms    Have unusual shortness of breath    Have increased joint or muscle pain    Have palpitations or an irregular heartbeat   Date Last Reviewed: 5/1/2016 2000-2018 [employer]. [address] [place] [address]. All rights reserved. This information is not intended as a substitute for professional medical care. Always follow your healthcare professional's instructions.          Understanding USDA MyPlate  The USDA (U.S. Department of Agriculture) has guidelines to help you make healthy food choices. These are called MyPlate. MyPlate shows the food groups that make up healthy meals using the image of a place setting. Before you eat, think about the healthiest choices for what to put onto your plate or into your cup or bowl. To learn more about building a healthy plate, visit  www.choosemyplate.gov.    The food groups    Fruits. Any fruit or 100% fruit juice counts as part of the Fruit Group. Fruits may be fresh, canned, frozen, or dried, and may be whole, cut-up, or pureed. Make half your plate fruits and vegetables.    Vegetables. Any vegetable or 100% vegetable juice counts as a member of the Vegetable Group. Vegetables may be fresh, frozen, canned, or dried. They can be served raw or cooked and may be whole, cut-up, or mashed. Make half your plate fruits and vegetables.    Grains. All foods made from grains are part of the Grains Group. These include wheat, rice, oats, cornmeal, and barley such as bread, pasta, oatmeal, cereal, tortillas, and grits. Grains should be no more than a quarter of your plate. At least half of your grains should be whole grains.    Protein. This group includes meat, poultry, seafood, beans and peas, eggs, processed soy products (like tofu), nuts (including nut butters), and seeds. Make protein choices no more than a quarter of your plate. Meat and poultry choices should be lean or low fat.    Dairy. All fluid milk products and foods made from milk that contain calcium, like yogurt and cheese, are part of the Dairy Group. (Foods that have little calcium, such as cream, butter, and cream cheese, are not part of the group.) Most dairy choices should be low-fat or fat-free.    Oils. These are fats that are liquid at room temperature. They include canola, corn, olive, soybean, and sunflower oil. Foods that are mainly oil include mayonnaise, certain salad dressings, and soft margarines. You should have only 5 to 7 teaspoons of oils a day. You probably already get this much from the food you eat.  Date Last Reviewed: 8/1/2017 2000-2018 [employer]. [address] [place] [address], [place] [address]. All rights reserved. This information is not intended as a substitute for professional medical care. Always follow your healthcare professional's  instructions.          Signs of Hearing Loss     Hearing much better with one ear can be a sign of hearing loss.     Hearing loss is a problem shared by many people. In fact, it is one of the most common health conditions, particularly as people age. Most people over age 65 have some hearing loss, and by age 80, almost everyone does. Because hearing loss usually occurs slowly over the years, you may not realize your hearing ability has gotten worse.  Have your hearing checked  Contact your healthcare provider if you:    Have to strain to hear normal conversation    Have to watch other people s faces very carefully to follow what they re saying    Need to ask people to repeat what they ve said    Often misunderstand what people are saying    Turn the volume of the television or radio up so high that others complain    Feel that people are mumbling when they re talking to you    Find that the effort to hear leaves you feeling tired and irritated    Notice, when using the phone, that you hear better with one ear than the other  Date Last Reviewed: 12/1/2016 2000-2018 [employer]. [address] [place] [address], [place] [address]. All rights reserved. This information is not intended as a substitute for professional medical care. Always follow your healthcare professional's instructions.

## 2019-09-23 ENCOUNTER — OFFICE VISIT (OUTPATIENT)
Dept: CARDIOLOGY | Facility: CLINIC | Age: 74
End: 2019-09-23
Payer: COMMERCIAL

## 2019-09-23 VITALS
DIASTOLIC BLOOD PRESSURE: 68 MMHG | WEIGHT: 247.6 LBS | HEIGHT: 73 IN | SYSTOLIC BLOOD PRESSURE: 110 MMHG | HEART RATE: 68 BPM | BODY MASS INDEX: 32.82 KG/M2

## 2019-09-23 DIAGNOSIS — I73.9 PAD (PERIPHERAL ARTERY DISEASE) (H): Primary | ICD-10-CM

## 2019-09-23 DIAGNOSIS — R09.89 BRUIT OF LEFT CAROTID ARTERY: ICD-10-CM

## 2019-09-23 DIAGNOSIS — I25.10 CORONARY ARTERY DISEASE INVOLVING NATIVE CORONARY ARTERY OF NATIVE HEART WITHOUT ANGINA PECTORIS: ICD-10-CM

## 2019-09-23 PROCEDURE — 99214 OFFICE O/P EST MOD 30 MIN: CPT | Performed by: INTERNAL MEDICINE

## 2019-09-23 ASSESSMENT — MIFFLIN-ST. JEOR: SCORE: 1926.99

## 2019-09-23 NOTE — LETTER
"9/23/2019    Michael Oliver MD  [address] [place] [address]    RE: Hood Arizmendi       Dear Colleague,    I had the pleasure of seeing Hood Arizmendi in the [facility].      Cardiology    I had the pleasure to follow up with your patient, Mr. Hood Arizmendi, in the Cardiovascular Medicine Clinic.  As you recall, he is a very pleasant 74-year-old gentleman who I got acquainted with when he moved to the [place] area.  He has known coronary artery disease, namely small vessel disease.  The posterolateral branch is 100% occluded with moderate disease elsewhere.  His coronary angiogram was performed in 2016, which confirms these findings.         From a cardiac perspective, this gentleman is doing well.  He has not noticed any chest pain, PND, orthopnea, syncope or any other cardiac related concerns.      Patient came back from a riverboat cruise in [facility].  He was active in [place] and walked without difficulty.  No cardiac symptoms on his current regimen.  He is going to go on additional riverboat cruise from [place] to [place] next year.  Here for a routine follow-up.     Carotid US:  IMPRESSION:   2018  1. Less than 50% diameter stenosis of the right ICA relative to the  distal ICA diameter. No change since the previous study.  2. Less than 50% diameter stenosis of the left ICA relative to the  distal ICA diameter. No change since the previous study.      PHYSICAL EXAMINATION:   VITAL SIGNS:  Blood pressure 110/68, pulse 68, height 1.854 m (6' 1\"), weight 112.3 kg (247 lb 9.6 oz).  GENERAL:  The patient is alert, oriented, no apparent distress.   HEENT:  Oropharynx clear, no sinus tenderness.   NECK:  No JVP.  No carotid bruits.   CARDIOVASCULAR:  Distant heart tones.  Normal S1, S2.  No murmurs, gallops or rubs.   LUNGS:  Coarse but clear.   ABDOMEN:  Soft, nontender, nondistended.   EXTREMITIES:  No clubbing, cyanosis or edema.      ASSESSMENT:   1.  Chest " discomfort with known single-vessel occlusive coronary artery disease, namely ostial posterolateral branch with ipsilateral collateral filling by angiogram in 2016.  Resolved  2.  Mild disease elsewhere.   3.  Normal LV systolic function.   4.  Mild carotid artery stenosis.   5.  Eye surgery    6.  Hypertension.   7.  Hyperlipidemia.   8.  Prostate Ca S/P radiotherapy     RECOMMENDATIONS:   1.  Atherosclerotic coronary artery disease, stable.  He is on appropriate guideline-directed medical therapy.  Let us continue with his stable cardiac regimen.  No return of symptoms.   2.  Peripheral arterial disease. Stable carotid ultrasound   3.  Hypertension, stable.   4.  Hyperlipidemia.  Let us continue with statin therapy.  He is due for cholesterol check this year.  I will leave this in your capable hands.   5.  We will have him return to clinic in 1 year's time with pre clinic carotid US and echocardiogram.      Charlotte Kay MD            Thank you for allowing me to participate in the care of your patient.      Sincerely,     Charlotte Kay MD     [facility]    cc:   Michael Oliver MD  [address] [address]

## 2019-09-23 NOTE — LETTER
"9/23/2019    Michael Oliver MD  [address] [place] [address]    RE: Hood Arizmendi       Dear Colleague,    I had the pleasure of seeing Hood Arizmendi in the [facility].      Cardiology    I had the pleasure to follow up with your patient, Mr. Hood Arizmendi, in the Cardiovascular Medicine Clinic.  As you recall, he is a very pleasant 74-year-old gentleman who I got acquainted with when he moved to the [place] area.  He has known coronary artery disease, namely small vessel disease.  The posterolateral branch is 100% occluded with moderate disease elsewhere.  His coronary angiogram was performed in 2016, which confirms these findings.         From a cardiac perspective, this gentleman is doing well.  He has not noticed any chest pain, PND, orthopnea, syncope or any other cardiac related concerns.      Patient came back from a riverboat cruise in [facility].  He was active in [place] and walked without difficulty.  No cardiac symptoms on his current regimen.  He is going to go on additional riverboat cruise from [place] to [place] next year.  Here for a routine follow-up.     Carotid US:  IMPRESSION:   2018  1. Less than 50% diameter stenosis of the right ICA relative to the  distal ICA diameter. No change since the previous study.  2. Less than 50% diameter stenosis of the left ICA relative to the  distal ICA diameter. No change since the previous study.      PHYSICAL EXAMINATION:   VITAL SIGNS:  Blood pressure 110/68, pulse 68, height 1.854 m (6' 1\"), weight 112.3 kg (247 lb 9.6 oz).  GENERAL:  The patient is alert, oriented, no apparent distress.   HEENT:  Oropharynx clear, no sinus tenderness.   NECK:  No JVP.  No carotid bruits.   CARDIOVASCULAR:  Distant heart tones.  Normal S1, S2.  No murmurs, gallops or rubs.   LUNGS:  Coarse but clear.   ABDOMEN:  Soft, nontender, nondistended.   EXTREMITIES:  No clubbing, cyanosis or edema.      ASSESSMENT:   1.  Chest " discomfort with known single-vessel occlusive coronary artery disease, namely ostial posterolateral branch with ipsilateral collateral filling by angiogram in 2016.  Resolved  2.  Mild disease elsewhere.   3.  Normal LV systolic function.   4.  Mild carotid artery stenosis.   5.  Eye surgery    6.  Hypertension.   7.  Hyperlipidemia.   8.  Prostate Ca S/P radiotherapy     RECOMMENDATIONS:   1.  Atherosclerotic coronary artery disease, stable.  He is on appropriate guideline-directed medical therapy.  Let us continue with his stable cardiac regimen.  No return of symptoms.   2.  Peripheral arterial disease. Stable carotid ultrasound   3.  Hypertension, stable.   4.  Hyperlipidemia.  Let us continue with statin therapy.  He is due for cholesterol check this year.  I will leave this in your capable hands.   5.  We will have him return to clinic in 1 year's time with pre clinic carotid US and echocardiogram.        Thank you for allowing me to participate in the care of your patient.    Sincerely,     Charlotte Kay MD     [facility]

## 2019-09-23 NOTE — PROGRESS NOTES
"  Cardiology    I had the pleasure to follow up with your patient, Mr. Hood Arizmendi, in the Cardiovascular Medicine Clinic.  As you recall, he is a very pleasant 74-year-old gentleman who I got acquainted with when he moved to the [place] area.  He has known coronary artery disease, namely small vessel disease.  The posterolateral branch is 100% occluded with moderate disease elsewhere.  His coronary angiogram was performed in 2016, which confirms these findings.         From a cardiac perspective, this gentleman is doing well.  He has not noticed any chest pain, PND, orthopnea, syncope or any other cardiac related concerns.      Patient came back from a riverboat cruise in [facility].  He was active in [place] and walked without difficulty.  No cardiac symptoms on his current regimen.  He is going to go on additional riverboat cruise from [place] to [place] next year.  Here for a routine follow-up.     Carotid US:  IMPRESSION:  2018  1. Less than 50% diameter stenosis of the right ICA relative to the  distal ICA diameter. No change since the previous study.  2. Less than 50% diameter stenosis of the left ICA relative to the  distal ICA diameter. No change since the previous study.      PHYSICAL EXAMINATION:   VITAL SIGNS:  Blood pressure 110/68, pulse 68, height 1.854 m (6' 1\"), weight 112.3 kg (247 lb 9.6 oz).  GENERAL:  The patient is alert, oriented, no apparent distress.   HEENT:  Oropharynx clear, no sinus tenderness.   NECK:  No JVP.  No carotid bruits.   CARDIOVASCULAR:  Distant heart tones.  Normal S1, S2.  No murmurs, gallops or rubs.   LUNGS:  Coarse but clear.   ABDOMEN:  Soft, nontender, nondistended.   EXTREMITIES:  No clubbing, cyanosis or edema.      ASSESSMENT:   1.  Chest discomfort with known single-vessel occlusive coronary artery disease, namely ostial posterolateral branch with ipsilateral collateral filling by angiogram in 2016.  Resolved  2.  Mild disease elsewhere.   3.  Normal LV systolic " function.   4.  Mild carotid artery stenosis.   5.  Eye surgery    6.  Hypertension.   7.  Hyperlipidemia.   8.  Prostate Ca S/P radiotherapy     RECOMMENDATIONS:   1.  Atherosclerotic coronary artery disease, stable.  He is on appropriate guideline-directed medical therapy.  Let us continue with his stable cardiac regimen.  No return of symptoms.   2.  Peripheral arterial disease. Stable carotid ultrasound   3.  Hypertension, stable.   4.  Hyperlipidemia.  Let us continue with statin therapy.  He is due for cholesterol check this year.  I will leave this in your capable hands.   5.  We will have him return to clinic in 1 year's time with pre clinic carotid US and echocardiogram.      Charlotte Kay MD

## 2019-09-24 DIAGNOSIS — Z00.00 ENCOUNTER FOR ROUTINE ADULT HEALTH EXAMINATION WITHOUT ABNORMAL FINDINGS: ICD-10-CM

## 2019-09-24 LAB
ALBUMIN SERPL-MCNC: 4.2 G/DL (ref 3.4–5)
ALP SERPL-CCNC: 67 U/L (ref 40–150)
ALT SERPL W P-5'-P-CCNC: 24 U/L (ref 0–70)
ANION GAP SERPL CALCULATED.3IONS-SCNC: 4 MMOL/L (ref 3–14)
AST SERPL W P-5'-P-CCNC: 15 U/L (ref 0–45)
BASOPHILS # BLD AUTO: 0 10E9/L (ref 0–0.2)
BASOPHILS NFR BLD AUTO: 0.9 %
BILIRUB SERPL-MCNC: 0.5 MG/DL (ref 0.2–1.3)
BUN SERPL-MCNC: 17 MG/DL (ref 7–30)
CALCIUM SERPL-MCNC: 9.1 MG/DL (ref 8.5–10.1)
CHLORIDE SERPL-SCNC: 108 MMOL/L (ref 94–109)
CHOLEST SERPL-MCNC: 160 MG/DL
CO2 SERPL-SCNC: 27 MMOL/L (ref 20–32)
CREAT SERPL-MCNC: 0.97 MG/DL (ref 0.66–1.25)
DIFFERENTIAL METHOD BLD: NORMAL
EOSINOPHIL # BLD AUTO: 0.1 10E9/L (ref 0–0.7)
EOSINOPHIL NFR BLD AUTO: 1.4 %
ERYTHROCYTE [DISTWIDTH] IN BLOOD BY AUTOMATED COUNT: 14.6 % (ref 10–15)
GFR SERPL CREATININE-BSD FRML MDRD: 77 ML/MIN/{1.73_M2}
GLUCOSE SERPL-MCNC: 104 MG/DL (ref 70–99)
HCT VFR BLD AUTO: 43.6 % (ref 40–53)
HDLC SERPL-MCNC: 48 MG/DL
HGB BLD-MCNC: 14.1 G/DL (ref 13.3–17.7)
LDLC SERPL CALC-MCNC: 76 MG/DL
LYMPHOCYTES # BLD AUTO: 1.3 10E9/L (ref 0.8–5.3)
LYMPHOCYTES NFR BLD AUTO: 30.5 %
MCH RBC QN AUTO: 29.6 PG (ref 26.5–33)
MCHC RBC AUTO-ENTMCNC: 32.3 G/DL (ref 31.5–36.5)
MCV RBC AUTO: 92 FL (ref 78–100)
MONOCYTES # BLD AUTO: 0.6 10E9/L (ref 0–1.3)
MONOCYTES NFR BLD AUTO: 12.7 %
NEUTROPHILS # BLD AUTO: 2.4 10E9/L (ref 1.6–8.3)
NEUTROPHILS NFR BLD AUTO: 54.5 %
NONHDLC SERPL-MCNC: 112 MG/DL
PLATELET # BLD AUTO: 217 10E9/L (ref 150–450)
POTASSIUM SERPL-SCNC: 4.2 MMOL/L (ref 3.4–5.3)
PROT SERPL-MCNC: 7 G/DL (ref 6.8–8.8)
RBC # BLD AUTO: 4.76 10E12/L (ref 4.4–5.9)
SODIUM SERPL-SCNC: 139 MMOL/L (ref 133–144)
TRIGL SERPL-MCNC: 182 MG/DL
WBC # BLD AUTO: 4.3 10E9/L (ref 4–11)

## 2019-09-24 PROCEDURE — 80053 COMPREHEN METABOLIC PANEL: CPT | Performed by: INTERNAL MEDICINE

## 2019-09-24 PROCEDURE — 80061 LIPID PANEL: CPT | Performed by: INTERNAL MEDICINE

## 2019-09-24 PROCEDURE — 36415 COLL VENOUS BLD VENIPUNCTURE: CPT | Performed by: INTERNAL MEDICINE

## 2019-09-24 PROCEDURE — 85025 COMPLETE CBC W/AUTO DIFF WBC: CPT | Performed by: INTERNAL MEDICINE

## 2019-09-27 ENCOUNTER — HEALTH MAINTENANCE LETTER (OUTPATIENT)
Age: 74
End: 2019-09-27

## 2019-11-20 ENCOUNTER — MYC MEDICAL ADVICE (OUTPATIENT)
Dept: PEDIATRICS | Facility: CLINIC | Age: 74
End: 2019-11-20

## 2019-11-20 DIAGNOSIS — N20.0 KIDNEY STONES: ICD-10-CM

## 2019-11-21 RX ORDER — HYDROCODONE BITARTRATE AND ACETAMINOPHEN 5; 325 MG/1; MG/1
1 TABLET ORAL EVERY 6 HOURS PRN
Qty: 10 TABLET | Refills: 0 | Status: SHIPPED | OUTPATIENT
Start: 2019-11-21 | End: 2020-12-15

## 2019-11-21 NOTE — TELEPHONE ENCOUNTER
Patient requesting Hydrocodone refill per last office visit 9/20:    1. Kidney stones  Refilled for as needed use on upcoming trip only.   - HYDROcodone-acetaminophen (NORCO) 5-325 MG tablet; Take 1 tablet by mouth every 6 hours as needed for severe pain  Dispense: 10 tablet; Refill: 0      Controlled Substance Refill Request for     Requested Prescriptions   Pending Prescriptions Disp Refills     HYDROcodone-acetaminophen (NORCO) 5-325 MG tablet 10 tablet 0     Sig: Take 1 tablet by mouth every 6 hours as needed for severe pain       There is no refill protocol information for this order        Problem List Complete:  No     PROVIDER TO CONSIDER COMPLETION OF PROBLEM LIST AND OVERVIEW/CONTROLLED SUBSTANCE AGREEMENT    Last Written Prescription Date:  9/20/2019  Last Fill Quantity: 10,   # refills: 0    THE MOST RECENT OFFICE VISIT MUST BE WITHIN THE PAST 3 MONTHS. AT LEAST ONE FACE TO FACE VISIT MUST OCCUR EVERY 6 MONTHS. ADDITIONAL VISITS CAN BE VIRTUAL.  (THIS STATEMENT SHOULD BE DELETED.)    Last Office Visit with [facility] primary care provider: 9/20/2019    Future Office visit: None    Controlled substance agreement:   Encounter-Level CSA:    There are no encounter-level csa.     Patient-Level CSA:    There are no patient-level csa.         Last Urine Drug Screen: No results found for: CDAUT, No results found for: COMDAT, No results found for: THC13, PCP13, COC13, MAMP13, OPI13, AMP13, BZO13, TCA13, MTD13, BAR13, OXY13, PPX13, BUP13     Processing:  Rx to be electronically transmitted to pharmacy by provider      [email or web address]       checked in past 3 months?  Yes 11/21/2019     [place] reviewed:  reviewed- no prescriptions on file    [place]  [email or web address]

## 2019-12-02 DIAGNOSIS — Z00.6 EXAMINATION OF PARTICIPANT IN CLINICAL TRIAL: Primary | ICD-10-CM

## 2019-12-02 PROCEDURE — 99001 SPECIMEN HANDLING PT-LAB: CPT

## 2020-01-27 ENCOUNTER — TRANSFERRED RECORDS (OUTPATIENT)
Dept: HEALTH INFORMATION MANAGEMENT | Facility: CLINIC | Age: 75
End: 2020-01-27

## 2020-01-31 ENCOUNTER — TRANSFERRED RECORDS (OUTPATIENT)
Dept: HEALTH INFORMATION MANAGEMENT | Facility: CLINIC | Age: 75
End: 2020-01-31

## 2020-02-20 ENCOUNTER — MYC MEDICAL ADVICE (OUTPATIENT)
Dept: PEDIATRICS | Facility: CLINIC | Age: 75
End: 2020-02-20

## 2020-02-20 DIAGNOSIS — G47.33 OSA (OBSTRUCTIVE SLEEP APNEA): Primary | ICD-10-CM

## 2020-02-20 NOTE — TELEPHONE ENCOUNTER
See pt's message regarding CPAP request  DME pended    LOV: 9/20/19     Please route back to MA/TC pool to notify pt and fax info to Radha at [facility] at [phone]    Thank you, Brii MCKEON

## 2020-02-24 NOTE — TELEPHONE ENCOUNTER
Patient responded saying they will need a new sleep study since the clinic they last had one done at only kept records for 7 years and it has been longer then that since their last sleep study. [facility] will ask if they have a preference for a location to have a sleep study done then pend orders for a referral.    Tylor Virgen at 10:53 AM on 2/24/2020  [facility] Health Guide  Phone [phone]

## 2020-02-27 ENCOUNTER — TELEPHONE (OUTPATIENT)
Dept: PEDIATRICS | Facility: CLINIC | Age: 75
End: 2020-02-27

## 2020-02-27 DIAGNOSIS — G47.33 OSA (OBSTRUCTIVE SLEEP APNEA): Primary | ICD-10-CM

## 2020-02-27 NOTE — TELEPHONE ENCOUNTER
Patient needs a new C-PAP machine but does not have access to findings from last sleep study due to how long ago it was. Needs new sleep study for C-PCP machine.    Tylor Virgen at 1:32 PM on 2/27/2020  [facility] Health Guide  Phone [phone]

## 2020-03-27 ENCOUNTER — TRANSFERRED RECORDS (OUTPATIENT)
Dept: HEALTH INFORMATION MANAGEMENT | Facility: CLINIC | Age: 75
End: 2020-03-27

## 2020-07-24 ENCOUNTER — TRANSFERRED RECORDS (OUTPATIENT)
Dept: HEALTH INFORMATION MANAGEMENT | Facility: CLINIC | Age: 75
End: 2020-07-24

## 2020-08-06 DIAGNOSIS — I25.10 CORONARY ARTERY DISEASE INVOLVING NATIVE CORONARY ARTERY OF NATIVE HEART WITHOUT ANGINA PECTORIS: ICD-10-CM

## 2020-08-06 DIAGNOSIS — I25.10 CORONARY ARTERY DISEASE INVOLVING NATIVE CORONARY ARTERY OF NATIVE HEART, ANGINA PRESENCE UNSPECIFIED: ICD-10-CM

## 2020-08-07 RX ORDER — ROSUVASTATIN CALCIUM 20 MG/1
TABLET, COATED ORAL
Qty: 90 TABLET | Refills: 0 | Status: SHIPPED | OUTPATIENT
Start: 2020-08-07 | End: 2020-10-16

## 2020-08-07 RX ORDER — AMLODIPINE BESYLATE 2.5 MG/1
TABLET ORAL
Qty: 90 TABLET | Refills: 0 | Status: SHIPPED | OUTPATIENT
Start: 2020-08-07 | End: 2020-10-16

## 2020-08-07 RX ORDER — METOPROLOL SUCCINATE 50 MG/1
TABLET, EXTENDED RELEASE ORAL
Qty: 90 TABLET | Refills: 0 | Status: SHIPPED | OUTPATIENT
Start: 2020-08-07 | End: 2020-10-16

## 2020-08-07 RX ORDER — RANOLAZINE 500 MG/1
TABLET, EXTENDED RELEASE ORAL
Qty: 180 TABLET | Refills: 3 | Status: SHIPPED | OUTPATIENT
Start: 2020-08-07 | End: 2021-09-02

## 2020-08-07 RX ORDER — ISOSORBIDE MONONITRATE 60 MG/1
TABLET, EXTENDED RELEASE ORAL
Qty: 90 TABLET | Refills: 0 | Status: SHIPPED | OUTPATIENT
Start: 2020-08-07 | End: 2020-10-16

## 2020-08-07 NOTE — TELEPHONE ENCOUNTER
Routing refill request to provider for review/approval because:  Drug ranolazine ER not on [place] [facility] [place]       Emelia Craven RN on 8/7/2020 at 9:08 AM

## 2020-08-07 NOTE — TELEPHONE ENCOUNTER
Prescription approved per [facility] Refill Protocol.    Emelia Craven RN on 8/7/2020 at 9:07 AM

## 2020-08-24 DIAGNOSIS — Z00.6 EXAMINATION OF PARTICIPANT IN CLINICAL TRIAL: Primary | ICD-10-CM

## 2020-09-01 ENCOUNTER — HOSPITAL ENCOUNTER (OUTPATIENT)
Dept: CARDIOLOGY | Facility: CLINIC | Age: 75
Discharge: HOME | End: 2020-09-01
Attending: INTERNAL MEDICINE | Admitting: INTERNAL MEDICINE
Payer: COMMERCIAL

## 2020-09-01 DIAGNOSIS — R09.89 BRUIT OF LEFT CAROTID ARTERY: ICD-10-CM

## 2020-09-01 PROCEDURE — 93880 EXTRACRANIAL BILAT STUDY: CPT | Mod: 26 | Performed by: INTERNAL MEDICINE

## 2020-09-01 PROCEDURE — 93880 EXTRACRANIAL BILAT STUDY: CPT

## 2020-09-21 ENCOUNTER — HOSPITAL ENCOUNTER (OUTPATIENT)
Dept: CARDIOLOGY | Facility: CLINIC | Age: 75
Discharge: HOME | End: 2020-09-21
Attending: INTERNAL MEDICINE | Admitting: INTERNAL MEDICINE
Payer: COMMERCIAL

## 2020-09-21 DIAGNOSIS — I25.10 CORONARY ARTERY DISEASE INVOLVING NATIVE CORONARY ARTERY OF NATIVE HEART WITHOUT ANGINA PECTORIS: ICD-10-CM

## 2020-09-21 PROCEDURE — 25500064 ZZH RX 255 OP 636: Performed by: INTERNAL MEDICINE

## 2020-09-21 PROCEDURE — 93306 TTE W/DOPPLER COMPLETE: CPT

## 2020-09-21 PROCEDURE — 93306 TTE W/DOPPLER COMPLETE: CPT | Mod: 26 | Performed by: INTERNAL MEDICINE

## 2020-09-21 RX ADMIN — HUMAN ALBUMIN MICROSPHERES AND PERFLUTREN 3 ML: 10; .22 INJECTION, SOLUTION INTRAVENOUS at 13:56

## 2020-10-01 ENCOUNTER — VIRTUAL VISIT (OUTPATIENT)
Dept: CARDIOLOGY | Facility: CLINIC | Age: 75
End: 2020-10-01
Payer: COMMERCIAL

## 2020-10-01 DIAGNOSIS — I25.10 CORONARY ARTERY DISEASE INVOLVING NATIVE CORONARY ARTERY OF NATIVE HEART WITHOUT ANGINA PECTORIS: Primary | ICD-10-CM

## 2020-10-01 PROCEDURE — 99214 OFFICE O/P EST MOD 30 MIN: CPT | Mod: 95 | Performed by: INTERNAL MEDICINE

## 2020-10-01 NOTE — LETTER
"10/1/2020    Michael Oliver MD  [address] [place] [address]    RE: Hood Arizmendi       Dear Colleague,    I had the pleasure of seeing Hood Arizmendi in the [facility].    Hood Arizmendi is a 75 year old male who is being evaluated via a billable video visit.      The patient has been notified of following:     \"This video visit will be conducted via a call between you and your physician/provider. We have found that certain health care needs can be provided without the need for an in-person physical exam.  This service lets us provide the care you need with a video conversation.  If a prescription is necessary we can send it directly to your pharmacy.  If lab work is needed we can place an order for that and you can then stop by our lab to have the test done at a later time.    Video visits are billed at different rates depending on your insurance coverage.  Please reach out to your insurance provider with any questions.    If during the course of the call the physician/provider feels a video visit is not appropriate, you will not be charged for this service.\"    Patient has given verbal consent for Video visit? Yes  How would you like to obtain your AVS? MyChart  If you are dropped from the video visit, the video invite should be resent to: Text to cell phone: [phone]  Will anyone else be joining your video visit? No      Vitals - Patient Reported 10/1/2020   Height (Patient Reported) 6' 1\"   Weight (Patient Reported) 240 lb   BMI (Based on Pt Reported Ht/Wt) 31.66 kg/m2   Systolic (Patient Reported) 135   Diastolic (Patient Reported) 85   Pulse (Patient Reported) 67         Review Of Systems  Skin: NEGATIVE  Eyes:Ears/Nose/Throat: Glasses  Respiratory: NEGATIVE  Cardiovascular:NEGATIVE  Gastrointestinal: NEGATIVE  Genitourinary:NEGATIVE   Musculoskeletal: NEGATIVE  Neurologic: NEGATIVE  Psychiatric: NEGATIVE  Hematologic/Lymphatic/Immunologic: " NEGATIVE  Endocrine:  NEGATIVE    Katie Yuan [facility]    Video-Visit Details    Type of service:  Video Visit    Video Start Time: 10:52 AM  Video End Time: 11:05 AM    Originating Location (pt. Location): Home    Distant Location (provider location):  [facility]     Platform used for Video Visit: Bartolo Kay MD        Cardiology    I had the pleasure to follow up with your patient, Mr. Hood Arizmendi, in the Cardiovascular Medicine Clinic.  As you recall, he is a very pleasant 75-year-old gentleman who I got acquainted with when he moved to the [place] area.  He has known coronary artery disease, namely small vessel disease.  The posterolateral branch is 100% occluded with moderate disease elsewhere.  His coronary angiogram was performed in 2016, which confirms these findings.         From a cardiac perspective, this gentleman is doing well.  He has not noticed any chest pain, PND, orthopnea, syncope or any other cardiac related concerns.           Echo 2020  1. Normal left ventricular size and systolic function. LVEF 55-60%.  2. No regional wall motion abnormalities.  3. Normal right ventricular size and systolic function.  4. No significant valve disease.      Carotid US: 2020    Right side:      Plaque Morphology: Echogenic, smooth         Proximal CCA: 124/28 cm/sec     Mid-distal CCA: 91/22 cm/sec     External CA: 117/16 cm/sec     Proximal ICA: 76/14 cm/sec     Mid ICA: 57/22 cm/sec     Distal ICA: 59/17 cm/sec     Vertebral artery: 36/14 cm/sec, antegrade     ICA/CCA ratio: 0.8     Left side:      Plaque Morphology: Echogenic, smooth        Proximal CCA: 95/25 cm/sec     Mid-distal CCA: 93/29 cm/sec     External CA: 75/17 cm/sec     Proximal ICA: 90/23 cm/sec     Mid ICA: 56/22 cm/sec     Distal ICA: 60/24 cm/sec     Vertebral artery: 34/8 cm/sec, antegrade     ICA/CCA ratio: 1.0                                                                       Impression:     1. Degree of stenosis of the internal carotid artery: Mild echogenic  smooth plaque. By velocity criteria there is <50% diameter stenosis of  the internal carotid artery. Similar to prior study dated 4/9/2018.     2. Left side: Mild echogenic smooth plaque. By velocity criteria there  is <50% diameter stenosis of the internal carotid artery. Similar to  prior study dated 4/9/2018.        PHYSICAL EXAMINATION:   VITAL SIGNS:  There were no vitals taken for this visit.  GENERAL: Healthy, alert and no distress  EYES: Eyes grossly normal to inspection.  No discharge or erythema, or obvious scleral/conjunctival abnormalities.  RESP: No audible wheeze, cough, or visible cyanosis.  No visible retractions or increased work of breathing.    SKIN: Visible skin clear. No significant rash, abnormal pigmentation or lesions.  NEURO: Cranial nerves grossly intact.  Mentation and speech appropriate for age.  PSYCH: Mentation appears normal, affect normal/bright, judgement and insight intact, normal speech and appearance well-groomed.        ASSESSMENT:   1.  Chest discomfort with known single-vessel occlusive coronary artery disease, namely ostial posterolateral branch with ipsilateral collateral filling by angiogram in 2016.  Resolved  2.  Mild disease elsewhere.   3.  Normal LV systolic function.   4.  Mild carotid artery stenosis.   5.  Eye surgery    6.  Hypertension.   7.  Hyperlipidemia.   8.  Prostate Ca S/P radiotherapy     RECOMMENDATIONS:   1.  Atherosclerotic coronary artery disease, stable.  He is on appropriate guideline-directed medical therapy.  Let us continue with his stable cardiac regimen.  No return of symptoms.   2.  Peripheral arterial disease. Stable carotid ultrasound   3.  Hypertension, stable.   4.  Hyperlipidemia.  Let us continue with statin therapy.  He is due for cholesterol check this year.  I will leave this in your capable hands.   5.  We will have him return to clinic in 1 year's  time        Thank you for allowing me to participate in the care of your patient.    Sincerely,     Charlotte Kay MD     [facility]

## 2020-10-01 NOTE — PROGRESS NOTES
"Hood Arizmendi is a 75 year old male who is being evaluated via a billable video visit.      The patient has been notified of following:     \"This video visit will be conducted via a call between you and your physician/provider. We have found that certain health care needs can be provided without the need for an in-person physical exam.  This service lets us provide the care you need with a video conversation.  If a prescription is necessary we can send it directly to your pharmacy.  If lab work is needed we can place an order for that and you can then stop by our lab to have the test done at a later time.    Video visits are billed at different rates depending on your insurance coverage.  Please reach out to your insurance provider with any questions.    If during the course of the call the physician/provider feels a video visit is not appropriate, you will not be charged for this service.\"    Patient has given verbal consent for Video visit? Yes  How would you like to obtain your AVS? MyChart  If you are dropped from the video visit, the video invite should be resent to: Text to cell phone: [phone]  Will anyone else be joining your video visit? No      Vitals - Patient Reported 10/1/2020   Height (Patient Reported) 6' 1\"   Weight (Patient Reported) 240 lb   BMI (Based on Pt Reported Ht/Wt) 31.66 kg/m2   Systolic (Patient Reported) 135   Diastolic (Patient Reported) 85   Pulse (Patient Reported) 67         Review Of Systems  Skin: NEGATIVE  Eyes:Ears/Nose/Throat: Glasses  Respiratory: NEGATIVE  Cardiovascular:NEGATIVE  Gastrointestinal: NEGATIVE  Genitourinary:NEGATIVE   Musculoskeletal: NEGATIVE  Neurologic: NEGATIVE  Psychiatric: NEGATIVE  Hematologic/Lymphatic/Immunologic: NEGATIVE  Endocrine:  NEGATIVE    Katie LORENZ    Video-Visit Details    Type of service:  Video Visit    Video Start Time: 10:52 AM  Video End Time: 11:05 AM    Originating Location (pt. Location): Home    Distant Location " (provider location):  [facility]     Platform used for Video Visit: Bartolo Kay MD        Cardiology    I had the pleasure to follow up with your patient, Mr. Hood Arizmendi, in the Cardiovascular Medicine Clinic.  As you recall, he is a very pleasant 75-year-old gentleman who I got acquainted with when he moved to the [place] area.  He has known coronary artery disease, namely small vessel disease.  The posterolateral branch is 100% occluded with moderate disease elsewhere.  His coronary angiogram was performed in 2016, which confirms these findings.         From a cardiac perspective, this gentleman is doing well.  He has not noticed any chest pain, PND, orthopnea, syncope or any other cardiac related concerns.           Echo 2020  1. Normal left ventricular size and systolic function. LVEF 55-60%.  2. No regional wall motion abnormalities.  3. Normal right ventricular size and systolic function.  4. No significant valve disease.      Carotid US: 2020    Right side:      Plaque Morphology: Echogenic, smooth         Proximal CCA: 124/28 cm/sec     Mid-distal CCA: 91/22 cm/sec     External CA: 117/16 cm/sec     Proximal ICA: 76/14 cm/sec     Mid ICA: 57/22 cm/sec     Distal ICA: 59/17 cm/sec     Vertebral artery: 36/14 cm/sec, antegrade     ICA/CCA ratio: 0.8     Left side:      Plaque Morphology: Echogenic, smooth        Proximal CCA: 95/25 cm/sec     Mid-distal CCA: 93/29 cm/sec     External CA: 75/17 cm/sec     Proximal ICA: 90/23 cm/sec     Mid ICA: 56/22 cm/sec     Distal ICA: 60/24 cm/sec     Vertebral artery: 34/8 cm/sec, antegrade     ICA/CCA ratio: 1.0                                                                      Impression:     1. Degree of stenosis of the internal carotid artery: Mild echogenic  smooth plaque. By velocity criteria there is <50% diameter stenosis of  the internal carotid artery. Similar to prior study dated 4/9/2018.     2. Left  side: Mild echogenic smooth plaque. By velocity criteria there  is <50% diameter stenosis of the internal carotid artery. Similar to  prior study dated 4/9/2018.        PHYSICAL EXAMINATION:   VITAL SIGNS:  There were no vitals taken for this visit.  GENERAL: Healthy, alert and no distress  EYES: Eyes grossly normal to inspection.  No discharge or erythema, or obvious scleral/conjunctival abnormalities.  RESP: No audible wheeze, cough, or visible cyanosis.  No visible retractions or increased work of breathing.    SKIN: Visible skin clear. No significant rash, abnormal pigmentation or lesions.  NEURO: Cranial nerves grossly intact.  Mentation and speech appropriate for age.  PSYCH: Mentation appears normal, affect normal/bright, judgement and insight intact, normal speech and appearance well-groomed.         ASSESSMENT:   1.  Chest discomfort with known single-vessel occlusive coronary artery disease, namely ostial posterolateral branch with ipsilateral collateral filling by angiogram in 2016.  Resolved  2.  Mild disease elsewhere.   3.  Normal LV systolic function.   4.  Mild carotid artery stenosis.   5.  Eye surgery    6.  Hypertension.   7.  Hyperlipidemia.   8.  Prostate Ca S/P radiotherapy     RECOMMENDATIONS:   1.  Atherosclerotic coronary artery disease, stable.  He is on appropriate guideline-directed medical therapy.  Let us continue with his stable cardiac regimen.  No return of symptoms.   2.  Peripheral arterial disease. Stable carotid ultrasound   3.  Hypertension, stable.   4.  Hyperlipidemia.  Let us continue with statin therapy.  He is due for cholesterol check this year.  I will leave this in your capable hands.   5.  We will have him return to clinic in 1 year's time      Charlotte Kay MD

## 2020-10-12 ENCOUNTER — TRANSFERRED RECORDS (OUTPATIENT)
Dept: HEALTH INFORMATION MANAGEMENT | Facility: CLINIC | Age: 75
End: 2020-10-12

## 2020-10-16 ENCOUNTER — TELEPHONE (OUTPATIENT)
Dept: PEDIATRICS | Facility: CLINIC | Age: 75
End: 2020-10-16

## 2020-10-16 DIAGNOSIS — C61 PROSTATE CANCER (H): ICD-10-CM

## 2020-10-16 DIAGNOSIS — Z00.00 ENCOUNTER FOR ROUTINE ADULT HEALTH EXAMINATION WITHOUT ABNORMAL FINDINGS: Primary | ICD-10-CM

## 2020-10-21 NOTE — TELEPHONE ENCOUNTER
Lab orders pended for PCP.    Tylor Virgen at 5:49 PM on 10/21/2020  [facility] Health Guide  Phone [phone]

## 2020-12-04 ENCOUNTER — TRANSFERRED RECORDS (OUTPATIENT)
Dept: HEALTH INFORMATION MANAGEMENT | Facility: CLINIC | Age: 75
End: 2020-12-04

## 2020-12-08 ENCOUNTER — MEDICAL CORRESPONDENCE (OUTPATIENT)
Dept: HEALTH INFORMATION MANAGEMENT | Facility: CLINIC | Age: 75
End: 2020-12-08

## 2020-12-10 ASSESSMENT — ENCOUNTER SYMPTOMS
WEAKNESS: 0
DYSURIA: 0
MYALGIAS: 0
PARESTHESIAS: 0
FREQUENCY: 0
CHILLS: 0
SHORTNESS OF BREATH: 0
ARTHRALGIAS: 0
ABDOMINAL PAIN: 0
CONSTIPATION: 0
HEARTBURN: 0
PALPITATIONS: 0
SORE THROAT: 0
NERVOUS/ANXIOUS: 0
NAUSEA: 0
HEMATURIA: 0
EYE PAIN: 0
FEVER: 0
JOINT SWELLING: 0
DIZZINESS: 0
HEMATOCHEZIA: 0
HEADACHES: 0
DIARRHEA: 0
COUGH: 0

## 2020-12-10 ASSESSMENT — ACTIVITIES OF DAILY LIVING (ADL): CURRENT_FUNCTION: NO ASSISTANCE NEEDED

## 2020-12-11 DIAGNOSIS — Z00.00 ENCOUNTER FOR ROUTINE ADULT HEALTH EXAMINATION WITHOUT ABNORMAL FINDINGS: ICD-10-CM

## 2020-12-11 DIAGNOSIS — K51.90 ULCERATIVE COLITIS (H): Primary | ICD-10-CM

## 2020-12-11 LAB
ALBUMIN SERPL-MCNC: 4.7 G/DL (ref 3.4–5)
ALP SERPL-CCNC: 80 U/L (ref 40–150)
ALT SERPL W P-5'-P-CCNC: 27 U/L (ref 0–70)
ANION GAP SERPL CALCULATED.3IONS-SCNC: 7 MMOL/L (ref 3–14)
AST SERPL W P-5'-P-CCNC: 17 U/L (ref 0–45)
BASOPHILS # BLD AUTO: 0.1 10E9/L (ref 0–0.2)
BASOPHILS NFR BLD AUTO: 1.1 %
BILIRUB SERPL-MCNC: 0.5 MG/DL (ref 0.2–1.3)
BUN SERPL-MCNC: 21 MG/DL (ref 7–30)
CALCIUM SERPL-MCNC: 9.4 MG/DL (ref 8.5–10.1)
CHLORIDE SERPL-SCNC: 106 MMOL/L (ref 94–109)
CHOLEST SERPL-MCNC: 139 MG/DL
CO2 SERPL-SCNC: 26 MMOL/L (ref 20–32)
CREAT SERPL-MCNC: 1.08 MG/DL (ref 0.66–1.25)
DIFFERENTIAL METHOD BLD: NORMAL
EOSINOPHIL # BLD AUTO: 0 10E9/L (ref 0–0.7)
EOSINOPHIL NFR BLD AUTO: 0.9 %
ERYTHROCYTE [DISTWIDTH] IN BLOOD BY AUTOMATED COUNT: 13 % (ref 10–15)
GFR SERPL CREATININE-BSD FRML MDRD: 67 ML/MIN/{1.73_M2}
GLUCOSE SERPL-MCNC: 100 MG/DL (ref 70–99)
HCT VFR BLD AUTO: 48 % (ref 40–53)
HDLC SERPL-MCNC: 41 MG/DL
HGB BLD-MCNC: 16 G/DL (ref 13.3–17.7)
LDLC SERPL CALC-MCNC: 75 MG/DL
LYMPHOCYTES # BLD AUTO: 1.4 10E9/L (ref 0.8–5.3)
LYMPHOCYTES NFR BLD AUTO: 30 %
MCH RBC QN AUTO: 30.3 PG (ref 26.5–33)
MCHC RBC AUTO-ENTMCNC: 33.3 G/DL (ref 31.5–36.5)
MCV RBC AUTO: 91 FL (ref 78–100)
MONOCYTES # BLD AUTO: 0.6 10E9/L (ref 0–1.3)
MONOCYTES NFR BLD AUTO: 12.6 %
NEUTROPHILS # BLD AUTO: 2.6 10E9/L (ref 1.6–8.3)
NEUTROPHILS NFR BLD AUTO: 55.4 %
NONHDLC SERPL-MCNC: 98 MG/DL
PLATELET # BLD AUTO: 244 10E9/L (ref 150–450)
POTASSIUM SERPL-SCNC: 4.4 MMOL/L (ref 3.4–5.3)
PROT SERPL-MCNC: 8.3 G/DL (ref 6.8–8.8)
RBC # BLD AUTO: 5.28 10E12/L (ref 4.4–5.9)
SODIUM SERPL-SCNC: 139 MMOL/L (ref 133–144)
TRIGL SERPL-MCNC: 117 MG/DL
TSH SERPL DL<=0.005 MIU/L-ACNC: 1.68 MU/L (ref 0.4–4)
WBC # BLD AUTO: 4.6 10E9/L (ref 4–11)

## 2020-12-11 PROCEDURE — 84443 ASSAY THYROID STIM HORMONE: CPT | Performed by: INTERNAL MEDICINE

## 2020-12-11 PROCEDURE — 85025 COMPLETE CBC W/AUTO DIFF WBC: CPT | Performed by: INTERNAL MEDICINE

## 2020-12-11 PROCEDURE — 36415 COLL VENOUS BLD VENIPUNCTURE: CPT | Performed by: INTERNAL MEDICINE

## 2020-12-11 PROCEDURE — 80061 LIPID PANEL: CPT | Performed by: INTERNAL MEDICINE

## 2020-12-11 PROCEDURE — 80053 COMPREHEN METABOLIC PANEL: CPT | Performed by: INTERNAL MEDICINE

## 2020-12-11 NOTE — PATIENT INSTRUCTIONS
Preventive Health Recommendations:     See your health care provider every year to    Review health changes.     Discuss preventive care.      Review your medicines if your doctor has prescribed any.      Talk with your health care provider about whether you should have a test to screen for prostate cancer (PSA).    Every 3 years, have a diabetes test (fasting glucose). If you are at risk for diabetes, you should have this test more often.    Every 5 years, have a cholesterol test. Have this test more often if you are at risk for high cholesterol or heart disease.     Every 10 years, have a colonoscopy. Or, have a yearly FIT test (stool test). These exams will check for colon cancer.    Talk to with your health care provider about screening for Abdominal Aortic Aneurysm if you have a family history of AAA or have a history of smoking.    Shots:     Get a flu shot each year.     Get a tetanus shot every 10 years.     Talk to your doctor about your pneumonia vaccines. There are now two you should receive - Pneumovax (PPSV 23) and Prevnar (PCV 13).     Talk to your pharmacist about a shingles vaccine.     Talk to your doctor about the hepatitis B vaccine.  Nutrition:     Eat at least 5 servings of fruits and vegetables each day.     Eat whole-grain bread, whole-wheat pasta and brown rice instead of white grains and rice.     Get adequate Calcium and Vitamin D.   Lifestyle    Exercise for at least 150 minutes a week (30 minutes a day, 5 days a week). This will help you control your weight and prevent disease.     Limit alcohol to one drink per day.     No smoking.     Wear sunscreen to prevent skin cancer.    See your dentist every six months for an exam and cleaning.    See your eye doctor every 1 to 2 years to screen for conditions such as glaucoma, macular degeneration, cataracts, etc.    Personalized Prevention Plan  You are due for the preventive services outlined below.  Your care team is available to assist  you in scheduling these services.  If you have already completed any of these items, please share that information with your care team to update in your medical record.  Health Maintenance Due   Topic Date Due     ANNUAL REVIEW OF HM ORDERS  1945     Flu Vaccine (1) 09/01/2020     FALL RISK ASSESSMENT  09/20/2020     Patient Education   Personalized Prevention Plan  You are due for the preventive services outlined below.  Your care team is available to assist you in scheduling these services.  If you have already completed any of these items, please share that information with your care team to update in your medical record.  Health Maintenance Due   Topic Date Due     FALL RISK ASSESSMENT  09/20/2020       Understanding [employer] MyPlate  The USDA (U.S. Department of Agriculture) has guidelines to help you make healthy food choices. These are called MyPlate. MyPlate shows the food groups that make up healthy meals using the image of a place setting. Before you eat, think about the healthiest choices for what to put onto your plate or into your cup or bowl. To learn more about building a healthy plate, visit www.choosemyplate.gov.    The food groups    Fruits. Any fruit or 100% fruit juice counts as part of the Fruit Group. Fruits may be fresh, canned, frozen, or dried, and may be whole, cut-up, or pureed. Make half your plate fruits and vegetables.    Vegetables. Any vegetable or 100% vegetable juice counts as a member of the Vegetable Group. Vegetables may be fresh, frozen, canned, or dried. They can be served raw or cooked and may be whole, cut-up, or mashed. Make half your plate fruits and vegetables.    Grains. All foods made from grains are part of the Grains Group. These include wheat, rice, oats, cornmeal, and barley such as bread, pasta, oatmeal, cereal, tortillas, and grits. Grains should be no more than a quarter of your plate. At least half of your grains should be whole grains.    Protein. This group  includes meat, poultry, seafood, beans and peas, eggs, processed soy products (like tofu), nuts (including nut butters), and seeds. Make protein choices no more than a quarter of your plate. Meat and poultry choices should be lean or low fat.    Dairy. All fluid milk products and foods made from milk that contain calcium, like yogurt and cheese, are part of the Dairy Group. (Foods that have little calcium, such as cream, butter, and cream cheese, are not part of the group.) Most dairy choices should be low-fat or fat-free.    Oils. These are fats that are liquid at room temperature. They include canola, corn, olive, soybean, and sunflower oil. Foods that are mainly oil include mayonnaise, certain salad dressings, and soft margarines. You should have only 5 to 7 teaspoons of oils a day. You probably already get this much from the food you eat.  [employer] last reviewed this educational content on 8/1/2017 2000-2020 [employer]. [address] [place] [address]. All rights reserved. This information is not intended as a substitute for professional medical care. Always follow your healthcare professional's instructions.          Signs of Hearing Loss      Hearing much better with one ear can be a sign of hearing loss.   Hearing loss is a problem shared by many people. In fact, it is one of the most common health problems, particularly as people age. Most people age 65 and older have some hearing loss. By age 80, almost everyone does. Hearing loss often occurs slowly over the years. So you may not realize your hearing has gotten worse.  Have your hearing checked  Call your healthcare provider if you:    Have to strain to hear normal conversation    Have to watch other people s faces very carefully to follow what they re saying    Need to ask people to repeat what they ve said    Often misunderstand what people are saying    Turn the volume of the television or radio up so high that others  complain    Feel that people are mumbling when they re talking to you    Find that the effort to hear leaves you feeling tired and irritated    Notice, when using the phone, that you hear better with one ear than the other  [employer] last reviewed this educational content on 1/1/2020 2000-2020 [employer]. [address] [place] [address], [place] [address]. All rights reserved. This information is not intended as a substitute for professional medical care. Always follow your healthcare professional's instructions.

## 2020-12-11 NOTE — PROGRESS NOTES
"SUBJECTIVE:   Hood Arizmendi is a 75 year old male who presents for Preventive Visit.      Patient has been advised of split billing requirements and indicates understanding: Yes     Are you in the first 12 months of your Medicare coverage?  No    Healthy Habits:     In general, how would you rate your overall health?  Good    Frequency of exercise:  4-5 days/week    Duration of exercise:  15-30 minutes    Do you usually eat at least 4 servings of fruit and vegetables a day, include whole grains    & fiber and avoid regularly eating high fat or \"junk\" foods?  No    Taking medications regularly:  Yes    Medication side effects:  None    Ability to successfully perform activities of daily living:  No assistance needed    Home Safety:  No safety concerns identified    Hearing Impairment:  Difficulty following a conversation in a noisy restaurant or crowded room    In the past 6 months, have you been bothered by leaking of urine?  No    In general, how would you rate your overall mental or emotional health?  Excellent      PHQ-2 Total Score: 0    Additional concerns today:  No    Do you feel safe in your environment? Yes    Have you ever done Advance Care Planning? (For example, a Health Directive, POLST, or a discussion with a medical provider or your loved ones about your wishes): Yes, advance care planning is on file.      Fall risk  Fallen 2 or more times in the past year?: No  Any fall with injury in the past year?: No  click delete button to remove this line now  Cognitive Screening   1) Repeat 3 items (Leader, Season, Table)    2) Clock draw: NORMAL  3) 3 item recall: Recalls 3 objects  Results: 3 items recalled: COGNITIVE IMPAIRMENT LESS LIKELY    Mini-CogTM Copyright BETH Pepe. Licensed by the author for use in [facility]; reprinted with permission (harmony@.[facility]). All rights reserved.      Do you have sleep apnea, excessive snoring or daytime drowsiness?: yes    Reviewed and updated as needed this " visit by clinical staff                 Reviewed and updated as needed this visit by Provider                Social History     Tobacco Use     Smoking status: Never Smoker     Smokeless tobacco: Never Used   Substance Use Topics     Alcohol use: Yes     Alcohol/week: 7.0 standard drinks     Types: 7 Standard drinks or equivalent per week     Comment: 1 per day     If you drink alcohol do you typically have >3 drinks per day or >7 drinks per week? No    Alcohol Use 12/10/2020   Prescreen: >3 drinks/day or >7 drinks/week? No   Prescreen: >3 drinks/day or >7 drinks/week? -           Hypertension Follow-up      Do you check your blood pressure regularly outside of the clinic? Yes, monthly at eye doctors     Are you following a low salt diet? No    Are your blood pressures ever more than 140 on the top number (systolic) OR more   than 90 on the bottom number (diastolic), for example 140/90? No      Current providers sharing in care for this patient include:   Patient Care Team:  Michael Oliver MD as PCP - General (Internal Medicine)  Michael Oliver MD as Assigned PCP  William Terrell MD as MD (Urology)  Tylor Virgen as Personal Advocate & Liaison (PAL)  Anand Maxwell MD as MD (Internal Medicine - Sleep Medicine)  Charlotte Kay MD as Assigned Heart and Vascular Provider    The following health maintenance items are reviewed in Epic and correct as of today:  Health Maintenance   Topic Date Due     ANNUAL REVIEW OF HM ORDERS  1945     INFLUENZA VACCINE (1) 09/01/2020     FALL RISK ASSESSMENT  09/20/2020     MEDICARE ANNUAL WELLNESS VISIT  12/15/2021     ADVANCE CARE PLANNING  06/14/2022     LIPID  12/11/2025     DTAP/TDAP/TD IMMUNIZATION (3 - Td) 05/15/2028     COLORECTAL CANCER SCREENING  10/12/2030     HEPATITIS C SCREENING  Completed     PHQ-2  Completed     Pneumococcal Vaccine: 65+ Years  Completed     ZOSTER IMMUNIZATION  Completed     AORTIC ANEURYSM  SCREENING (SYSTEM ASSIGNED)  Completed     Pneumococcal Vaccine: Pediatrics (0 to 5 Years) and At-Risk Patients (6 to 64 Years)  Aged Out     IPV IMMUNIZATION  Aged Out     MENINGITIS IMMUNIZATION  Aged Out     HEPATITIS B IMMUNIZATION  Aged Out     Lab work is in process  Labs reviewed in EPIC  BP Readings from Last 3 Encounters:   12/15/20 102/64   09/23/19 110/68   09/20/19 98/60    Wt Readings from Last 3 Encounters:   12/15/20 113.4 kg (250 lb)   09/23/19 112.3 kg (247 lb 9.6 oz)   09/20/19 110.7 kg (244 lb)                  Patient Active Problem List   Diagnosis     Ulcerative colitis (H)     Hyperlipidemia with target LDL less than 100     DARYL (obstructive sleep apnea)     Kidney stones     Seasonal allergic rhinitis     Vitamin D deficiency     Carotid artery stenosis     Insomnia, unspecified type     Essential hypertension     Coronary artery disease involving native coronary artery of native heart without angina pectoris     ACP (advance care planning)     Elevated prostate specific antigen (PSA)     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), wet (H)     Prostate cancer (H)     Past Surgical History:   Procedure Laterality Date     APPENDECTOMY       CORONARY ANGIOGRAPHY ADULT ORDER  2011, 2016    med tx, small vessels     CYSTOSCOPY       EYE SURGERY  03/2017    retinal detachment      SURGICAL HISTORY OF -       tonsils     SURGICAL HISTORY OF -       cystoscopy for kidney stones     SURGICAL HISTORY OF -       nasal surgery       Social History     Tobacco Use     Smoking status: Never Smoker     Smokeless tobacco: Never Used   Substance Use Topics     Alcohol use: Yes     Alcohol/week: 7.0 standard drinks     Types: 7 Standard drinks or equivalent per week     Comment: 1 per day     Family History   Problem Relation Age of Onset     C.A.D. Mother      C.A.D. Maternal Grandfather      Cancer Father         lung cancer, smoker.          Current Outpatient Medications   Medication Sig Dispense Refill      amLODIPine (NORVASC) 2.5 MG tablet TAKE 1 TABLET DAILY 90 tablet 0     aspirin 81 MG tablet Take 81 mg by mouth 2 times daily       balsalazide (COLAZAL) 750 MG capsule Take 2 tablets twice daily       isosorbide mononitrate (IMDUR) 60 MG 24 hr tablet TAKE 1 TABLET DAILY 90 tablet 0     medical cannabis liquid (This is NOT a prescription, and does not certify that the patient has a qualifying medical condition for medical cannabis.  The purpose of this order is  to document that the patient reports taking medical cannabis.)       melatonin 3 MG tablet Take 1 tablet (3 mg) by mouth nightly as needed for sleep       metoprolol succinate ER (TOPROL-XL) 50 MG 24 hr tablet TAKE 1 TABLET DAILY 90 tablet 0     polyethylene glycol (MIRALAX) powder Take 17 g by mouth daily 510 g 1     psyllium (METAMUCIL SMOOTH TEXTURE) 63 % POWD Take 2 teaspoonful by mouth daily Mix in 8 ounces of water 1 Bottle 11     ranolazine (RANEXA) 500 MG 12 hr tablet TAKE 1 TABLET EVERY 12 HOURS 180 tablet 3     rosuvastatin (CRESTOR) 20 MG tablet TAKE 1 TABLET DAILY 90 tablet 0     tamsulosin (FLOMAX) 0.4 MG capsule Take 1 capsule (0.4 mg) by mouth daily       timolol hemihydrate (BETIMOL) 0.5 % ophthalmic solution Place 1 drop into both eyes 2 times daily       nitroglycerin (NITROLINGUAL) 0.4 MG/SPRAY spray For chest pain spray 1 spray under tongue every 5 minutes for 3 doses. If symptoms persist 5 minutes after 1st dose call 911. 4.9 g 0     ORDER FOR DME Equipment being ordered: CPAP and supplies 1 each 0     Allergies   Allergen Reactions     Sulfa Drugs Hives         Review of Systems   Constitutional: Negative for chills and fever.   HENT: Negative for congestion, ear pain, hearing loss and sore throat.    Eyes: Negative for pain and visual disturbance.   Respiratory: Negative for cough and shortness of breath.    Cardiovascular: Negative for chest pain, palpitations and peripheral edema.   Gastrointestinal: Negative for abdominal pain,  "constipation, diarrhea, heartburn, hematochezia and nausea.   Genitourinary: Positive for urgency. Negative for discharge, dysuria, frequency, genital sores, hematuria and impotence.   Musculoskeletal: Negative for arthralgias, joint swelling and myalgias.   Skin: Negative for rash.   Neurological: Negative for dizziness, weakness, headaches and paresthesias.   Psychiatric/Behavioral: Negative for mood changes. The patient is not nervous/anxious.      CONSTITUTIONAL: NEGATIVE for fever, chills, change in weight  INTEGUMENTARY/SKIN: NEGATIVE for worrisome rashes, moles or lesions  EYES: NEGATIVE for vision changes or irritation  ENT/MOUTH: NEGATIVE for ear, mouth and throat problems  RESP: NEGATIVE for significant cough or SOB  BREAST: NEGATIVE for masses, tenderness or discharge  CV: NEGATIVE for chest pain, palpitations or peripheral edema  GI: NEGATIVE for nausea, abdominal pain, heartburn, or change in bowel habits  : NEGATIVE for frequency, dysuria, or hematuria  MUSCULOSKELETAL: NEGATIVE for significant arthralgias or myalgia  NEURO: NEGATIVE for weakness, dizziness or paresthesias  ENDOCRINE: NEGATIVE for temperature intolerance, skin/hair changes  HEME: NEGATIVE for bleeding problems  PSYCHIATRIC: NEGATIVE for changes in mood or affect    OBJECTIVE:   /64 (BP Location: Right arm, Patient Position: Sitting, Cuff Size: Adult Large)   Pulse 58   Temp 97.6  F (36.4  C) (Tympanic)   Resp 16   Ht 1.866 m (6' 1.47\")   Wt 113.4 kg (250 lb)   SpO2 94%   BMI 32.57 kg/m   Estimated body mass index is 32.57 kg/m  as calculated from the following:    Height as of this encounter: 1.866 m (6' 1.47\").    Weight as of this encounter: 113.4 kg (250 lb).  Physical Exam  GENERAL: healthy, alert and no distress  EYES: Eyes grossly normal to inspection, PERRL and conjunctivae and sclerae normal  HENT: ear canals and TM's normal, nose and mouth without ulcers or lesions  NECK: no adenopathy, no asymmetry, masses, or " scars and thyroid normal to palpation  RESP: lungs clear to auscultation - no rales, rhonchi or wheezes  CV: regular rate and rhythm, normal S1 S2, no S3 or S4, no murmur, click or rub, no peripheral edema and peripheral pulses strong  ABDOMEN: soft, nontender, no hepatosplenomegaly, no masses and bowel sounds normal   (male): normal male genitalia without lesions or urethral discharge, no hernia  MS: no gross musculoskeletal defects noted, no edema  SKIN: no suspicious lesions or rashes  NEURO: Normal strength and tone, mentation intact and speech normal  PSYCH: mentation appears normal, affect normal/bright    Diagnostic Test Results:  Labs reviewed in Epic    ASSESSMENT / PLAN:   1. Routine general medical examination at a health care facility  Discussed diet, exercise, testicular self exam, blood pressure, cholesterol, and need for cancer surveillance at appropriate ages.   - REVIEW OF HEALTH MAINTENANCE PROTOCOL ORDERS    2. Kidney stones  Refilled for as needed use.   - HYDROcodone-acetaminophen (NORCO) 5-325 MG tablet; Take 1 tablet by mouth every 6 hours as needed for severe pain  Dispense: 10 tablet; Refill: 0  - tamsulosin (FLOMAX) 0.4 MG capsule; Take 1 capsule (0.4 mg) by mouth daily  Dispense: 90 capsule; Refill: 3    3. Coronary artery disease involving native coronary artery of native heart without angina pectoris  Parameters well controlled.  Continue secondary risk factor modification for BP, cholesterol, anticoagulation, and smoking cessation.   - amLODIPine (NORVASC) 2.5 MG tablet; Take 1 tablet (2.5 mg) by mouth daily  Dispense: 90 tablet; Refill: 3  - metoprolol succinate ER (TOPROL-XL) 50 MG 24 hr tablet; Take 1 tablet (50 mg) by mouth daily  Dispense: 90 tablet; Refill: 3  - rosuvastatin (CRESTOR) 20 MG tablet; Take 1 tablet (20 mg) by mouth daily  Dispense: 90 tablet; Refill: 3    4. Coronary artery disease involving native coronary artery of native heart, angina presence  "unspecified  Asymptomatic.   - isosorbide mononitrate (IMDUR) 60 MG 24 hr tablet; Take 1 tablet (60 mg) by mouth daily  Dispense: 90 tablet; Refill: 0    5. Benign prostatic hyperplasia with lower urinary tract symptoms, symptom details unspecified  Refilled for daily use.  Symptoms stable.   - tamsulosin (FLOMAX) 0.4 MG capsule; Take 1 capsule (0.4 mg) by mouth daily  Dispense: 90 capsule; Refill: 3    6. Exudative age-related macular degeneration, unspecified laterality, unspecified stage (H)  Management per ophthalmology.  Sees them every 3-6 months.     7. Ulcerative colitis with complication, unspecified location (H)  Management per gastroenterology; asymptomatic.     8. PAD (peripheral artery disease) (H)  Secondary risk factor modification.     9. Prostate cancer (H)  Management per urology at [place].     10. Encounter for Medicare annual wellness exam  Discussed diet, exercise, testicular self exam, blood pressure, cholesterol, and need for cancer surveillance at appropriate ages.       Patient has been advised of split billing requirements and indicates understanding: Yes  COUNSELING:  Reviewed preventive health counseling, as reflected in patient instructions       Regular exercise       Healthy diet/nutrition       Vision screening       Hearing screening       Colon cancer screening       Prostate cancer screening    Estimated body mass index is 32.57 kg/m  as calculated from the following:    Height as of this encounter: 1.866 m (6' 1.47\").    Weight as of this encounter: 113.4 kg (250 lb).    Weight management plan: Patient was referred to their PCP to discuss a diet and exercise plan.    He reports that he has never smoked. He has never used smokeless tobacco.      Appropriate preventive services were discussed with this patient, including applicable screening as appropriate for cardiovascular disease, diabetes, osteopenia/osteoporosis, and glaucoma.  As appropriate for age/gender, discussed screening " for colorectal cancer, prostate cancer, breast cancer, and cervical cancer. Checklist reviewing preventive services available has been given to the patient.    Reviewed patients plan of care and provided an AVS. The Basic Care Plan (routine screening as documented in Health Maintenance) for Hood meets the Care Plan requirement. This Care Plan has been established and reviewed with the Patient.    Counseling Resources:  ATP IV Guidelines  Pooled Cohorts Equation Calculator  Breast Cancer Risk Calculator  Breast Cancer: Medication to Reduce Risk  FRAX Risk Assessment  ICSI Preventive Guidelines  Dietary Guidelines for Americans, 2010  [employer] MyPlate  ASA Prophylaxis  Lung CA Screening    Michael Oliver MD  [facility]    Identified Health Risks:    The patient was counseled and encouraged to consider modifying their diet and eating habits. He was provided with information on recommended healthy diet options.  The patient was provided with written information regarding signs of hearing loss.

## 2020-12-15 ENCOUNTER — TRANSFERRED RECORDS (OUTPATIENT)
Dept: HEALTH INFORMATION MANAGEMENT | Facility: CLINIC | Age: 75
End: 2020-12-15

## 2020-12-15 ENCOUNTER — OFFICE VISIT (OUTPATIENT)
Dept: PEDIATRICS | Facility: CLINIC | Age: 75
End: 2020-12-15
Payer: COMMERCIAL

## 2020-12-15 VITALS
WEIGHT: 250 LBS | TEMPERATURE: 97.6 F | BODY MASS INDEX: 33.13 KG/M2 | DIASTOLIC BLOOD PRESSURE: 64 MMHG | SYSTOLIC BLOOD PRESSURE: 102 MMHG | HEART RATE: 58 BPM | HEIGHT: 73 IN | OXYGEN SATURATION: 94 % | RESPIRATION RATE: 16 BRPM

## 2020-12-15 DIAGNOSIS — Z00.00 ENCOUNTER FOR MEDICARE ANNUAL WELLNESS EXAM: ICD-10-CM

## 2020-12-15 DIAGNOSIS — H35.3290 EXUDATIVE AGE-RELATED MACULAR DEGENERATION, UNSPECIFIED LATERALITY, UNSPECIFIED STAGE (H): ICD-10-CM

## 2020-12-15 DIAGNOSIS — K51.919 ULCERATIVE COLITIS WITH COMPLICATION, UNSPECIFIED LOCATION (H): ICD-10-CM

## 2020-12-15 DIAGNOSIS — I25.10 CORONARY ARTERY DISEASE INVOLVING NATIVE CORONARY ARTERY OF NATIVE HEART, ANGINA PRESENCE UNSPECIFIED: ICD-10-CM

## 2020-12-15 DIAGNOSIS — I73.9 PAD (PERIPHERAL ARTERY DISEASE) (H): ICD-10-CM

## 2020-12-15 DIAGNOSIS — Z00.00 ROUTINE GENERAL MEDICAL EXAMINATION AT A HEALTH CARE FACILITY: Primary | ICD-10-CM

## 2020-12-15 DIAGNOSIS — N20.0 KIDNEY STONES: ICD-10-CM

## 2020-12-15 DIAGNOSIS — C61 PROSTATE CANCER (H): ICD-10-CM

## 2020-12-15 DIAGNOSIS — I25.10 CORONARY ARTERY DISEASE INVOLVING NATIVE CORONARY ARTERY OF NATIVE HEART WITHOUT ANGINA PECTORIS: ICD-10-CM

## 2020-12-15 DIAGNOSIS — N40.1 BENIGN PROSTATIC HYPERPLASIA WITH LOWER URINARY TRACT SYMPTOMS, SYMPTOM DETAILS UNSPECIFIED: ICD-10-CM

## 2020-12-15 PROCEDURE — 99397 PER PM REEVAL EST PAT 65+ YR: CPT | Performed by: INTERNAL MEDICINE

## 2020-12-15 RX ORDER — METOPROLOL SUCCINATE 50 MG/1
50 TABLET, EXTENDED RELEASE ORAL DAILY
Qty: 90 TABLET | Refills: 3 | Status: SHIPPED | OUTPATIENT
Start: 2020-12-15 | End: 2021-11-03

## 2020-12-15 RX ORDER — ROSUVASTATIN CALCIUM 20 MG/1
20 TABLET, COATED ORAL DAILY
Qty: 90 TABLET | Refills: 3 | Status: SHIPPED | OUTPATIENT
Start: 2020-12-15 | End: 2022-03-18

## 2020-12-15 RX ORDER — ISOSORBIDE MONONITRATE 60 MG/1
60 TABLET, EXTENDED RELEASE ORAL DAILY
Qty: 90 TABLET | Refills: 0 | Status: SHIPPED | OUTPATIENT
Start: 2020-12-15 | End: 2021-06-25

## 2020-12-15 RX ORDER — HYDROCODONE BITARTRATE AND ACETAMINOPHEN 5; 325 MG/1; MG/1
1 TABLET ORAL EVERY 6 HOURS PRN
Qty: 10 TABLET | Refills: 0 | Status: SHIPPED | OUTPATIENT
Start: 2020-12-15 | End: 2021-11-03

## 2020-12-15 RX ORDER — TAMSULOSIN HYDROCHLORIDE 0.4 MG/1
0.4 CAPSULE ORAL DAILY
Qty: 90 CAPSULE | Refills: 3 | Status: SHIPPED | OUTPATIENT
Start: 2020-12-15 | End: 2022-04-14

## 2020-12-15 RX ORDER — AMLODIPINE BESYLATE 2.5 MG/1
2.5 TABLET ORAL DAILY
Qty: 90 TABLET | Refills: 3 | Status: SHIPPED | OUTPATIENT
Start: 2020-12-15 | End: 2022-03-18

## 2020-12-15 ASSESSMENT — ENCOUNTER SYMPTOMS
CHILLS: 0
PALPITATIONS: 0
SHORTNESS OF BREATH: 0
PARESTHESIAS: 0
COUGH: 0
NERVOUS/ANXIOUS: 0
FEVER: 0
HEMATOCHEZIA: 0
HEADACHES: 0
CONSTIPATION: 0
HEMATURIA: 0
MYALGIAS: 0
DIARRHEA: 0
ABDOMINAL PAIN: 0
DYSURIA: 0
HEARTBURN: 0
FREQUENCY: 0
EYE PAIN: 0
NAUSEA: 0
WEAKNESS: 0
DIZZINESS: 0
JOINT SWELLING: 0
SORE THROAT: 0
ARTHRALGIAS: 0

## 2020-12-15 ASSESSMENT — MIFFLIN-ST. JEOR: SCORE: 1940.25

## 2020-12-15 ASSESSMENT — ACTIVITIES OF DAILY LIVING (ADL): CURRENT_FUNCTION: NO ASSISTANCE NEEDED

## 2020-12-24 DIAGNOSIS — I25.10 CORONARY ARTERY DISEASE INVOLVING NATIVE CORONARY ARTERY OF NATIVE HEART, ANGINA PRESENCE UNSPECIFIED: ICD-10-CM

## 2020-12-24 DIAGNOSIS — I25.10 CORONARY ARTERY DISEASE INVOLVING NATIVE CORONARY ARTERY OF NATIVE HEART WITHOUT ANGINA PECTORIS: ICD-10-CM

## 2020-12-24 RX ORDER — ISOSORBIDE MONONITRATE 60 MG/1
TABLET, EXTENDED RELEASE ORAL
Qty: 90 TABLET | Refills: 3 | OUTPATIENT
Start: 2020-12-24

## 2020-12-24 RX ORDER — AMLODIPINE BESYLATE 2.5 MG/1
TABLET ORAL
Qty: 90 TABLET | Refills: 3 | OUTPATIENT
Start: 2020-12-24

## 2020-12-24 RX ORDER — METOPROLOL SUCCINATE 50 MG/1
TABLET, EXTENDED RELEASE ORAL
Qty: 90 TABLET | Refills: 3 | OUTPATIENT
Start: 2020-12-24

## 2020-12-24 RX ORDER — ROSUVASTATIN CALCIUM 20 MG/1
TABLET, COATED ORAL
Qty: 90 TABLET | Refills: 3 | OUTPATIENT
Start: 2020-12-24

## 2021-01-08 ENCOUNTER — TRANSFERRED RECORDS (OUTPATIENT)
Dept: HEALTH INFORMATION MANAGEMENT | Facility: CLINIC | Age: 76
End: 2021-01-08

## 2021-03-01 ENCOUNTER — IMMUNIZATION (OUTPATIENT)
Dept: NURSING | Facility: CLINIC | Age: 76
End: 2021-03-01
Payer: COMMERCIAL

## 2021-03-01 PROCEDURE — 0011A PR COVID VAC MODERNA 100 MCG/0.5 ML IM: CPT

## 2021-03-01 PROCEDURE — 91301 PR COVID VAC MODERNA 100 MCG/0.5 ML IM: CPT

## 2021-03-29 ENCOUNTER — IMMUNIZATION (OUTPATIENT)
Dept: NURSING | Facility: CLINIC | Age: 76
End: 2021-03-29
Attending: INTERNAL MEDICINE
Payer: COMMERCIAL

## 2021-03-29 PROCEDURE — 91301 PR COVID VAC MODERNA 100 MCG/0.5 ML IM: CPT

## 2021-03-29 PROCEDURE — 0012A PR COVID VAC MODERNA 100 MCG/0.5 ML IM: CPT

## 2021-07-02 ENCOUNTER — TRANSFERRED RECORDS (OUTPATIENT)
Dept: HEALTH INFORMATION MANAGEMENT | Facility: CLINIC | Age: 76
End: 2021-07-02

## 2021-09-02 DIAGNOSIS — I25.10 CORONARY ARTERY DISEASE INVOLVING NATIVE CORONARY ARTERY OF NATIVE HEART WITHOUT ANGINA PECTORIS: ICD-10-CM

## 2021-09-02 RX ORDER — RANOLAZINE 500 MG/1
TABLET, EXTENDED RELEASE ORAL
Qty: 180 TABLET | Refills: 3 | Status: SHIPPED | OUTPATIENT
Start: 2021-09-02 | End: 2022-09-15

## 2021-09-13 ENCOUNTER — LAB (OUTPATIENT)
Dept: LAB | Facility: CLINIC | Age: 76
End: 2021-09-13
Payer: COMMERCIAL

## 2021-09-13 DIAGNOSIS — C61 MALIGNANT NEOPLASM OF PROSTATE (H): Primary | ICD-10-CM

## 2021-09-17 ENCOUNTER — TRANSFERRED RECORDS (OUTPATIENT)
Dept: HEALTH INFORMATION MANAGEMENT | Facility: CLINIC | Age: 76
End: 2021-09-17

## 2021-10-14 ENCOUNTER — MYC MEDICAL ADVICE (OUTPATIENT)
Dept: SLEEP MEDICINE | Facility: CLINIC | Age: 76
End: 2021-10-14

## 2021-10-20 ASSESSMENT — SLEEP AND FATIGUE QUESTIONNAIRES
HOW LIKELY ARE YOU TO NOD OFF OR FALL ASLEEP WHILE SITTING INACTIVE IN A PUBLIC PLACE: WOULD NEVER DOZE
HOW LIKELY ARE YOU TO NOD OFF OR FALL ASLEEP IN A CAR, WHILE STOPPED FOR A FEW MINUTES IN TRAFFIC: WOULD NEVER DOZE
HOW LIKELY ARE YOU TO NOD OFF OR FALL ASLEEP WHILE SITTING QUIETLY AFTER LUNCH WITHOUT ALCOHOL: WOULD NEVER DOZE
HOW LIKELY ARE YOU TO NOD OFF OR FALL ASLEEP WHILE LYING DOWN TO REST IN THE AFTERNOON WHEN CIRCUMSTANCES PERMIT: WOULD NEVER DOZE
HOW LIKELY ARE YOU TO NOD OFF OR FALL ASLEEP WHEN YOU ARE A PASSENGER IN A CAR FOR AN HOUR WITHOUT A BREAK: WOULD NEVER DOZE
HOW LIKELY ARE YOU TO NOD OFF OR FALL ASLEEP WHILE WATCHING TV: SLIGHT CHANCE OF DOZING
HOW LIKELY ARE YOU TO NOD OFF OR FALL ASLEEP WHILE SITTING AND READING: SLIGHT CHANCE OF DOZING
HOW LIKELY ARE YOU TO NOD OFF OR FALL ASLEEP WHILE SITTING AND TALKING TO SOMEONE: WOULD NEVER DOZE

## 2021-10-22 ENCOUNTER — TELEPHONE (OUTPATIENT)
Dept: SLEEP MEDICINE | Facility: CLINIC | Age: 76
End: 2021-10-22

## 2021-10-22 NOTE — TELEPHONE ENCOUNTER
Pt's data card rev'd via mail here at [place].  Card information has been downloaded and report scanned into chart.    Data card has been mailed back to pt.    KELECHI Saldaña

## 2021-10-23 ENCOUNTER — HEALTH MAINTENANCE LETTER (OUTPATIENT)
Age: 76
End: 2021-10-23

## 2021-10-26 NOTE — PATIENT INSTRUCTIONS
Your BMI is There is no height or weight on file to calculate BMI.  Weight management is a personal decision.  If you are interested in exploring weight loss strategies, the following discussion covers the approaches that may be successful. Body mass index (BMI) is one way to tell whether you are at a healthy weight, overweight, or obese. It measures your weight in relation to your height.  A BMI of 18.5 to 24.9 is in the healthy range. A person with a BMI of 25 to 29.9 is considered overweight, and someone with a BMI of 30 or greater is considered obese. More than two-thirds of American adults are considered overweight or obese.  Being overweight or obese increases the risk for further weight gain. Excess weight may lead to heart disease and diabetes.  Creating and following plans for healthy eating and physical activity may help you improve your health.  Weight control is part of healthy lifestyle and includes exercise, emotional health, and healthy eating habits. Careful eating habits lifelong are the mainstay of weight control. Though there are significant health benefits from weight loss, long-term weight loss with diet alone may be very difficult to achieve- studies show long-term success with dietary management in less than 10% of people. Attaining a healthy weight may be especially difficult to achieve in those with severe obesity. In some cases, medications, devices and surgical management might be considered.  What can you do?  If you are overweight or obese and are interested in methods for weight loss, you should discuss this with your provider.     Consider reducing daily calorie intake by 500 calories.     Keep a food journal.     Avoiding skipping meals, consider cutting portions instead.    Diet combined with exercise helps maintain muscle while optimizing fat loss. Strength training is particularly important for building and maintaining muscle mass. Exercise helps reduce stress, increase energy,  "and improves fitness. Increasing exercise without diet control, however, may not burn enough calories to loose weight.       Start walking three days a week 10-20 minutes at a time    Work towards walking thirty minutes five days a week     Eventually, increase the speed of your walking for 1-2 minutes at time    In addition, we recommend that you review healthy lifestyles and methods for weight loss available through the National Institutes of Health patient information sites:  http://win.niddk.nih.gov/publications/index.htm    And look into health and wellness programs that may be available through your health insurance provider, employer, local community center, or [place] [employer].    Weight management plan: Patient was referred to their PCP to discuss a diet and exercise plan.          MY TREATMENT INFORMATION FOR SLEEP APNEA-  Hood Arizmendi    DOCTOR : Anand Maxwell MD    Am I having a sleep study at a sleep center?  --->Due to insurance clearance delays, you will be contacted within 2-4 weeks to schedule    Am I having a home sleep study?  --->Watch the video for the device you are using:    -/drop off device-   https://www.[employer].com/watch?v=yGGFBdELGhk    -Disposable device sent out require phone/computer application-   https://www.[employer].com/watch?v=BCce_vbiwxE      Frequently asked questions:  1. What is Obstructive Sleep Apnea (DARYL)? DARYL is the most common type of sleep apnea. Apnea means, \"without breath.\"  Apnea is most often caused by narrowing or collapse of the upper airway as muscles relax during sleep.   Almost everyone has occasional apneas. Most people with sleep apnea have had brief interruptions at night frequently for many years.  The severity of sleep apnea is related to how frequent and severe the events are.   2. What are the consequences of DARYL? Symptoms include: feeling sleepy during the day, snoring loudly, gasping or stopping of breathing, trouble sleeping, " and occasionally morning headaches or heartburn at night.  Sleepiness can be serious and even increase the risk of falling asleep while driving. Other health consequences may include development of high blood pressure and other cardiovascular disease in persons who are susceptible. Untreated DARYL  can contribute to heart disease, stroke and diabetes.   3. What are the treatment options? In most situations, sleep apnea is a lifelong disease that must be managed with daily therapy. Medications are not effective for sleep apnea and surgery is generally not considered until other therapies have been tried. Your treatment is your choice . Continuous Positive Airway (CPAP) works right away and is the therapy that is effective in nearly everyone. An oral device to hold your jaw forward is usually the next most reliable option. Other options include postioning devices (to keep you off your back), weight loss, and surgery including a tongue pacing device. There is more detail about some of these options below.  4. Are my sleep studies covered by insurance? Although we will request verification of coverage, we advise you also check in advance of the study to ensure there is coverage.    Important tips for those choosing CPAP and similar devices   Know your equipment:  CPAP is continuous positive airway pressure that prevents obstructive sleep apnea by keeping the throat from collapsing while you are sleeping. In most cases, the device is  smart  and can slowly self-adjusts if your throat collapses and keeps a record every day of how well you are treated-this information is available to you and your care team.  BPAP is bilevel positive airway pressure that keeps your throat open and also assists each breath with a pressure boost to maintain adequate breathing.  Special kinds of BPAP are used in patients who have inadequate breathing from lung or heart disease. In most cases, the device is  smart  and can slowly self-adjusts to  assist breathing. Like CPAP, the device keeps a record of how well you are treated.  Your mask is your connection to the device. You get to choose what feels most comfortable and the staff will help to make sure if fits. Here: are some examples of the different masks that are available:       Key points to remember on your journey with sleep apnea:  1. Sleep study.  PAP devices often need to be adjusted during a sleep study to show that they are effective and adjusted right.  2. Good tips to remember: Try wearing just the mask during a quiet time during the day so your body adapts to wearing it. A humidifier is recommended for comfort in most cases to prevent drying of your nose and throat. Allergy medication from your provider may help you if you are having nasal congestion.  3. Getting settled-in. It takes more than one night for most of us to get used to wearing a mask. Try wearing just the mask during a quiet time during the day so your body adapts to wearing it. A humidifier is recommended for comfort in most cases. Our team will work with you carefully on the first day and will be in contact within 4 days and again at 2 and 4 weeks for advice and remote device adjustments. Your therapy is evaluated by the device each day.   4. Use it every night. The more you are able to sleep naturally for 7-8 hours, the more likely you will have good sleep and to prevent health risks or symptoms from sleep apnea. Even if you use it 4 hours it helps. Occasionally all of us are unable to use a medical therapy, in sleep apnea, it is not dangerous to miss one night.   5. Communicate. Call our skilled team on the number provided on the first day if your visit for problems that make it difficult to wear the device. Over 2 out of 3 patients can learn to wear the device long-term with help from our team. Remember to call our team or your sleep providers if you are unable to wear the device as we may have other solutions for those  who cannot adapt to mask CPAP therapy. It is recommended that you sleep your sleep provider within the first 3 months and yearly after that if you are not having problems.   6. Use it for your health. We encourage use of CPAP masks during daytime quiet periods to allow your face and brain to adapt to the sensation of CPAP so that it will be a more natural sensation to awaken to at night or during naps. This can be very useful during the first few weeks or months of adapting to CPAP though it does not help medically to wear CPAP during wakefulness and  should not be used as a strategy just to meet guidelines.  7. Take care of your equipment. Make sure you clean your mask and tubing using directions every day and that your filter and mask are replaced as recommended or if they are not working.     BESIDES CPAP, WHAT OTHER THERAPIES ARE THERE?    Positioning Device  Positioning devices are generally used when sleep apnea is mild and only occurs on your back.This example shows a pillow that straps around the waist. It may be appropriate for those whose sleep study shows milder sleep apnea that occurs primarily when lying flat on one's back. Preliminary studies have shown benefit but effectiveness at home may need to be verified by a home sleep test. These devices are generally not covered by medical insurance.  Examples of devices that maintain sleeping on the back to prevent snoring and mild sleep apnea.    Belt type body positioner  http://ComCam.Liqu[employer]ins/    Electronic reminder  http://nightshifttherapy.com/  http://www.MindiepoClosetbox.[employer].au/      Oral Appliance  What is oral appliance therapy?  An oral appliance device fits on your teeth at night like a retainer used after having braces. The device is made by a specialized dentist and requires several visits over 1-2 months before a manufactured device is made to fit your teeth and is adjusted to prevent your sleep apnea. Once an oral device is working properly, snoring  should be improved. A home sleep test may be recommended at that time if to determine whether the sleep apnea is adequately treated.       Some things to remember:  -Oral devices are often, but not always, covered by your medical insurance. Be sure to check with your insurance provider.   -If you are referred for oral therapy, you will be given a list of specialized dentists to consider or you may choose to visit the Web site of the American Academy of Dental Sleep Medicine  -Oral devices are less likely to work if you have severe sleep apnea or are extremely overweight.     More detailed information  An oral appliance is a small acrylic device that fits over the upper and lower teeth  (similar to a retainer or a mouth guard). This device slightly moves jaw forward, which moves the base of the tongue forward, opens the airway, improves breathing for effective treat snoring and obstructive sleep apnea in perhaps 7 out of 10 people .  The best working devices are custom-made by a dental device  after a mold is made of the teeth 1, 2, 3.  When is an oral appliance indicated?  Oral appliance therapy is recommended as a first-line treatment for patients with primary snoring, mild sleep apnea, and for patients with moderate sleep apnea who prefer appliance therapy to use of CPAP4, 5. Severity of sleep apnea is determined by sleep testing and is based on the number of respiratory events per hour of sleep.   How successful is oral appliance therapy?  The success rate of oral appliance therapy in patients with mild sleep apnea is 75-80% while in patients with moderate sleep apnea it is 50-70%. The chance of success in patients with severe sleep apnea is 40-50%. The research also shows that oral appliances have a beneficial effect on the cardiovascular health of DARYL patients at the same magnitude as CPAP therapy7.  Oral appliances should be a second-line treatment in cases of severe sleep apnea, but if not  completely successful then a combination therapy utilizing CPAP plus oral appliance therapy may be effective. Oral appliances tend to be effective in a broad range of patients although studies show that the patients who have the highest success are females, younger patients, those with milder disease, and less severe obesity. 3, 6.   Finding a dentist that practices dental sleep medicine  Specific training is available through the American Academy of Dental Sleep Medicine for dentists interested in working in the field of sleep. To find a dentist who is educated in the field of sleep and the use of oral appliances, near you, visit the Web site of the American Academy of Dental Sleep Medicine.    References  1. Ling et al. Objectively measured vs self-reported compliance during oral appliance therapy for sleep-disordered breathing. Chest 2013; 144(5): 7061-8150.  2. Mary Ann et al. Objective measurement of compliance during oral appliance therapy for sleep-disordered breathing. Thorax 2013; 68(1): 91-96.  3. Hermilo, et al. Mandibular advancement devices in 620 men and women with DARYL and snoring: tolerability and predictors of treatment success. Chest 2004; 125: 2953-0901.  4. Stephanie, et al. Oral appliances for snoring and DARYL: a review. Sleep 2006; 29: 244-262.  5. Helga, et al. Oral appliance treatment for DARYL: an update. J Clin Sleep Med 2014; 10(2): 215-227.  6. Amol et al. Predictors of OSAH treatment outcome. J Dent Res 2007; 86: 5524-8079.      Weight Loss:    Weight loss is a long-term strategy that may improve sleep apnea in some patients.    Weight management is a personal decision and the decision should be based on your interest and the potential benefits.  If you are interested in exploring weight loss strategies, the following discussion covers the impact on weight loss on sleep apnea and the approaches that may be successful.    Being overweight does not necessarily mean you will  have health consequences.  Those who have BMI over 35 or over 27 with existing medical conditions carries greater risk.   Weight loss decreases severity of sleep apnea in most people with obesity. For those with mild obesity who have developed snoring with weight gain, even 15-30 pound weight loss can improve and occasionally eliminate sleep apnea.  Structured and life-long dietary and health habits are necessary to lose weight and keep healthier weight levels.     Though there may be significant health benefits from weight loss, long-term weight loss is very difficult to achieve- studies show success with dietary management in less than 10% of people. In addition, substantial weight loss may require years of dietary control and may be difficult if patients have severe obesity. In these cases, surgical management may be considered.  Finally, older individuals who have tolerated obesity without health complications may be less likely to benefit from weight loss strategies.        Your BMI is There is no height or weight on file to calculate BMI.  Weight management is a personal decision.  If you are interested in exploring weight loss strategies, the following discussion covers the approaches that may be successful. Body mass index (BMI) is one way to tell whether you are at a healthy weight, overweight, or obese. It measures your weight in relation to your height.  A BMI of 18.5 to 24.9 is in the healthy range. A person with a BMI of 25 to 29.9 is considered overweight, and someone with a BMI of 30 or greater is considered obese. More than two-thirds of American adults are considered overweight or obese.  Being overweight or obese increases the risk for further weight gain. Excess weight may lead to heart disease and diabetes.  Creating and following plans for healthy eating and physical activity may help you improve your health.  Weight control is part of healthy lifestyle and includes exercise, emotional health,  and healthy eating habits. Careful eating habits lifelong are the mainstay of weight control. Though there are significant health benefits from weight loss, long-term weight loss with diet alone may be very difficult to achieve- studies show long-term success with dietary management in less than 10% of people. Attaining a healthy weight may be especially difficult to achieve in those with severe obesity. In some cases, medications, devices and surgical management might be considered.  What can you do?  If you are overweight or obese and are interested in methods for weight loss, you should discuss this with your provider.     Consider reducing daily calorie intake by 500 calories.     Keep a food journal.     Avoiding skipping meals, consider cutting portions instead.    Diet combined with exercise helps maintain muscle while optimizing fat loss. Strength training is particularly important for building and maintaining muscle mass. Exercise helps reduce stress, increase energy, and improves fitness. Increasing exercise without diet control, however, may not burn enough calories to loose weight.       Start walking three days a week 10-20 minutes at a time    Work towards walking thirty minutes five days a week     Eventually, increase the speed of your walking for 1-2 minutes at time    In addition, we recommend that you review healthy lifestyles and methods for weight loss available through the National Institutes of Health patient information sites:  http://win.niddk.nih.gov/publications/index.htm    And look into health and wellness programs that may be available through your health insurance provider, employer, local community center, or [place] [employer].    Surgery:    Surgery for obstructive sleep apnea is considered generally only when other therapies fail to work. Surgery may be discussed with you if you are having a difficult time tolerating CPAP and or when there is an abnormal structure that requires surgical  correction.  Nose and throat surgeries often enlarge the airway to prevent collapse.  Most of these surgeries create pain for 1-2 weeks and up to half of the most common surgeries are not effective throughout life.  You should carefully discuss the benefits and drawbacks to surgery with your sleep provider and surgeon to determine if it is the best solution for you.   More information  Surgery for DARYL is directed at areas that are responsible for narrowing or complete obstruction of the airway during sleep.  There are a wide range of procedures available to enlarge and/or stabilize the airway to prevent blockage of breathing in the three major areas where it can occur: the palate, tongue, and nasal regions.  Successful surgical treatment depends on the accurate identification of the factors responsible for obstructive sleep apnea in each person.  A personalized approach is required because there is no single treatment that works well for everyone.  Because of anatomic variation, consultation with an examination by a sleep surgeon is a critical first step in determining what surgical options are best for each patient.  In some cases, examination during sedation may be recommended in order to guide the selection of procedures.  Patients will be counseled about risks and benefits as well as the typical recovery course after surgery. Surgery is typically not a cure for a person s DARYL.  However, surgery will often significantly improve one s DARYL severity (termed  success rate ).  Even in the absence of a cure, surgery will decrease the cardiovascular risk associated with OSA7; improve overall quality of life8 (sleepiness, functionality, sleep quality, etc).      Palate Procedures:  Patients with DARYL often have narrowing of their airway in the region of their tonsils and uvula.  The goals of palate procedures are to widen the airway in this region as well as to help the tissues resist collapse.  Modern palate procedure  techniques focus on tissue conservation and soft tissue rearrangement, rather than tissue removal.  Often the uvula is preserved in this procedure. Residual sleep apnea is common in patient after pharyngoplasty with an average reduction in sleep apnea events of 33%2.      Tongue Procedures:  ExamWhile patients are awake, the muscles that surround the throat are active and keep this region open for breathing. These muscles relax during sleep, allowing the tongue and other structures to collapse and block breathing.  There are several different tongue procedures available.  Selection of a tongue base procedure depends on characteristics seen on physical exam.  Generally, procedures are aimed at removing bulky tissues in this area or preventing the back of the tongue from falling back during sleep.  Success rates for tongue surgery range from 50-62%3.    Hypoglossal Nerve Stimulation:  Hypoglossal nerve stimulation has recently received approval from the [place] Food and Drug Administration for the treatment of obstructive sleep apnea.  This is based on research showing that the system was safe and effective in treating sleep apnea6.  Results showed that the median AHI score decreased 68%, from 29.3 to 9.0. This therapy uses an implant system that senses breathing patterns and delivers mild stimulation to airway muscles, which keeps the airway open during sleep.  The system consists of three fully implanted components: a small generator (similar in size to a pacemaker), a breathing sensor, and a stimulation lead.  Using a small handheld remote, a patient turns the therapy on before bed and off upon awakening.    Candidates for this device must be greater than 22 years of age, have moderate to severe DARYL (AHI between 20-65), BMI less than 32, have tried CPAP/oral appliance without success, and have appropriate upper airway anatomy (determined by a sleep endoscopy performed by Dr. Funk).    Hypoglossal Nerve  Stimulation Pathway:    The sleep surgeon s office will work with the patient through the insurance prior-authorization process (including communications and appeals).    Nasal Procedures:  Nasal obstruction can interfere with nasal breathing during the day and night.  Studies have shown that relief of nasal obstruction can improve the ability of some patients to tolerate positive airway pressure therapy for obstructive sleep apnea1.  Treatment options include medications such as nasal saline, topical corticosteroid and antihistamine sprays, and oral medications such as antihistamines or decongestants. Non-surgical treatments can include external nasal dilators for selected patients. If these are not successful by themselves, surgery can improve the nasal airway either alone or in combination with these other options.      Combination Procedures:  Combination of surgical procedures and other treatments may be recommended, particularly if patients have more than one area of narrowing or persistent positional disease.  The success rate of combination surgery ranges from 66-80%2,3.    References  1. Marquise DYSON. The Role of the Nose in Snoring and Obstructive Sleep Apnoea: An Update.  Eur Arch Otorhinolaryngol. 2011; 268: 1365-73.  2.  Wing SM; Rafi JA; Derrick JR; Pallanch JF; Ruddy MB; Sunshine SG; Zeina MENJIVAR. Surgical modifications of the upper airway for obstructive sleep apnea in adults: a systematic review and meta-analysis. SLEEP 2010;33(10):0902-2836. Jaymie DEVINE. Hypopharyngeal surgery in obstructive sleep apnea: an evidence-based medicine review.  Arch Otolaryngol Head Neck Surg. 2006 Feb;132(2):206-13.  3. Colt YH1, Muna Y, Angel BUDDY. The efficacy of anatomically based multilevel surgery for obstructive sleep apnea. Otolaryngol Head Neck Surg. 2003 Oct;129(4):327-35.  4. Jaymie DEVINE, Goldberg A. Hypopharyngeal Surgery in Obstructive Sleep Apnea: An Evidence-Based Medicine Review. Arch Otolaryngol Head Neck  Surg. 2006 Feb;132(2):206-13.  5. Saadia PJ et al. Upper-Airway Stimulation for Obstructive Sleep Apnea.  N Engl J Med. 2014 Jan 9;370(2):139-49.  6. Ace Y et al. Increased Incidence of Cardiovascular Disease in Middle-aged Men with Obstructive Sleep Apnea. Am J Respir Crit Care Med; 2002 166: 159-165  7. Mariana EM et al. Studying Life Effects and Effectiveness of Palatopharyngoplasty (SLEEP) study: Subjective Outcomes of Isolated Uvulopalatopharyngoplasty. Otolaryngol Head Neck Surg. 2011; 144: 623-631.    Drive Safe... Drive Alive     Sleep health profoundly affects your health, mood, and your safety.  Thirty three percent of the population (one in three of us) is not getting enough sleep and many have a sleep disorder. Not getting enough sleep or having an untreated / undertreated sleep condition may make us sleepy without even knowing it. In fact, our driving could be dramatically impaired due to our sleep health. As your provider, here are some things I would like you to know about driving:     Here are some warning signs for impairment and dangerous drowsy driving:              -Having been awake more than 16 hours               -Looking tired               -Eyelid drooping              -Head nodding (it could be too late at this point)              -Driving for more than 30 minutes     Some things you could do to make the driving safer if you are experiencing some drowsiness:              -Stop driving and rest              -Call for transportation              -Make sure your sleep disorder is adequately treated     Some things that have been shown NOT to work when experiencing drowsiness while driving:              -Turning on the radio              -Opening windows              -Eating any  distracting  /  entertaining  foods (e.g., sunflower seeds, candy, or any other)              -Talking on the phone      Your decision may not only impact your life, but also the life of others. Please, remember to  drive safe for yourself and all of us.  Please  bring your old  DeVilbiss CPAP device to [facility]([phone]) to make sure that the pressure settings on the device range between 8-15 cmH2O.   Please do not to use the CPAP on the night of the home sleep study for better accuracy and you can resume the use after the sleep study is completed.  Plan is to communicate results of sleep study via My Chart within 1 week and  provide prescription for  auto CPAP 8-15 cmH2O which was found to be effective per the compliance download. Due to the wait list with the current device shortage, please keep using your old CPAP until you are able to obtain the replacement equipment.  Please schedule follow up video visit  to review compliance measures if it is required by your insurance in approximately 6 to 8 weeks after obtaining the replacement equipment.   Otherwise plan to follow-up at sleep clinic in 1 year or sooner if any concerns.

## 2021-10-26 NOTE — PROGRESS NOTES
Harrison is a 76 year old who is being evaluated via a billable video visit.      How would you like to obtain your AVS? MyChart  If the video visit is dropped, the invitation should be resent by: Text to cell phone: [phone]  Will anyone else be joining your video visit? No        Video-Visit Details    Type of service:  Video Visit    Video Start Time: Start: 10/27/2021 03:05 pm  Stop: 10/27/2021 03:34 pm  Originating Location (pt. Location): Home    Distant Location (provider location):  [facility] [place]     Platform used for Video Visit: [facility]  Sleep Consultation Note     Date on this visit: 10/27/2021    Hood NILSON Page is sent by Michael Dong regarding obtaining prescription for replacement CPAP equipment    Primary Physician: Michael Oliver     Chief complaint: Needs prescription for replacement CPAP equipment    Hood DEVINE Page 76 year old male with PMH DARYL, coronary artery disease, hypertension, hyperlipidemia, carotid artery stenosis, who presents to sleep clinic today for a virtual visit requesting prescription to obtain  replacement CPAP equipment.  He reports that he had a previous sleep study through a sleep center in the [place], [place] between 2001/2002 and the reports are unavailable. (According to our clinic staff there is no copy of the record of the sleep study, [facility] AND [facility] IN [place] AND NONE OF THE LOCATIONS HAVE A COPY OF THE SLEEP STUDY). He initially obtained a DeVilbiss CPAP device and the current CPAP that he has is the John Respironics device(pressure settings 8-15 cm water) that has been recalled, which is the replacement device he obtained more than 8 years ago while he was still living in [place].  He has been getting the supplies for the CPAP through [facility].    He stopped using his John Respironics CPAP device since August 29, 2021 after receiving the notification about the device recall.  He registered his  device through the [employer] on August 29, 2021. He switched to using his old CPAP device which is the  DeVilbiss and reports that the pressure settings on that device is between 6 to 12 cm of water.   He would like to obtain a replacement CPAP equipment.    He reports using the DeVilbiss CPAP device regularly during sleep. CPAP compliance download from the [employer] cannot be obtained.  He reports that the device is working fine.  He denies air hunger. Pressure settings feel comfortable. He denies reports of snoring or awakenings gasping for air with the device use.  His sleep quality is better with the CPAP use.      DOWNLOADABLE COMPLIANCE DATA:   From July 30, 2021 through August 28, 2021 reveals that he used the device for 30 out of 30 days with an average use of 8.11 hours on the days used. There was no significant air leak . Residual AHI was 0.4 per hour. Mean pressure settings: 8.4 ; peak average pressure: 9.1 and device pressure<= 90% of time: 9.4 cm water    He goes to bed around 12 midnight and wakes up at 8 AM.  He wakes up once to use the bathroom and occasionally has difficulty resuming sleep following the awakening.  He does not nap during the day. He denies fatigue or excessive daytime sleepiness.  He denies drowsiness while driving.    SLEEP SCALES:  Patient's Gunter Sleepiness score 2/24   Insomnia severity index:7    Hood does not complain of restlessness feelings in the legs.    He denies any night time behaviors - sleep walking, sleep talking, sleep eating.  He does not complain acting out dreams.      Allergies:    Allergies   Allergen Reactions     Sulfa Drugs Hives       Medications:    Current Outpatient Medications   Medication Sig Dispense Refill     amLODIPine (NORVASC) 2.5 MG tablet Take 1 tablet (2.5 mg) by mouth daily 90 tablet 3     aspirin 81 MG tablet Take 81 mg by mouth 2 times daily       balsalazide (COLAZAL) 750 MG capsule Take 2 tablets twice daily        HYDROcodone-acetaminophen (NORCO) 5-325 MG tablet Take 1 tablet by mouth every 6 hours as needed for severe pain 10 tablet 0     isosorbide mononitrate (IMDUR) 60 MG 24 hr tablet Take 1 tablet (60 mg) by mouth daily 90 tablet 1     medical cannabis liquid (This is NOT a prescription, and does not certify that the patient has a qualifying medical condition for medical cannabis.  The purpose of this order is  to document that the patient reports taking medical cannabis.)       metoprolol succinate ER (TOPROL-XL) 50 MG 24 hr tablet Take 1 tablet (50 mg) by mouth daily 90 tablet 3     nitroglycerin (NITROLINGUAL) 0.4 MG/SPRAY spray For chest pain spray 1 spray under tongue every 5 minutes for 3 doses. If symptoms persist 5 minutes after 1st dose call 911. 4.9 g 0     ORDER FOR DME Equipment being ordered: CPAP and supplies 1 each 0     polyethylene glycol (MIRALAX) powder Take 17 g by mouth daily 510 g 1     psyllium (METAMUCIL SMOOTH TEXTURE) 63 % POWD Take 2 teaspoonful by mouth daily Mix in 8 ounces of water 1 Bottle 11     ranolazine (RANEXA) 500 MG 12 hr tablet TAKE 1 TABLET EVERY 12 HOURS 180 tablet 3     rosuvastatin (CRESTOR) 20 MG tablet Take 1 tablet (20 mg) by mouth daily 90 tablet 3     tamsulosin (FLOMAX) 0.4 MG capsule Take 1 capsule (0.4 mg) by mouth daily 90 capsule 3     timolol hemihydrate (BETIMOL) 0.5 % ophthalmic solution Place 1 drop into both eyes 2 times daily         Problem List:  Patient Active Problem List    Diagnosis Date Noted     PAD (peripheral artery disease) (H) 12/15/2020     Priority: Medium     Prostate cancer (H) 08/04/2018     Priority: Medium     Management per urology.       Elevated prostate specific antigen (PSA) 06/30/2018     Priority: Medium     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), wet (H) 05/01/2018     Priority: Medium     ACP (advance care planning) 06/14/2017     Priority: Medium     Advance Care Planning 6/14/2017: Recommend Code Status in chart and patient's  Advance Care Planning documents be reviewed to ensure alignment with patient's current wishes.  Most recent Advance Care Planning document is a Health Care Directive dated 12/6/16.  Mirta Aguilar,  Advance Care Planning Liaison         Insomnia, unspecified type 07/25/2016     Priority: Medium     Essential hypertension 07/25/2016     Priority: Medium     Coronary artery disease involving native coronary artery of native heart without angina pectoris 07/25/2016     Priority: Medium     Carotid artery stenosis      Priority: Medium     Ulcerative colitis (H) 08/28/2014     Priority: Medium     Followed by [facility]; every 2 years colonoscopy       Hyperlipidemia with target LDL less than 100 08/28/2014     Priority: Medium     Diagnosis updated by automated process. Provider to review and confirm.       DARYL (obstructive sleep apnea) 08/28/2014     Priority: Medium     On CPAP 6/12, and temazepam alternating with zolpidem       Kidney stones 08/28/2014     Priority: Medium     Seasonal allergic rhinitis 08/28/2014     Priority: Medium     Vitamin D deficiency 08/28/2014     Priority: Medium     Problem list name updated by automated process. Provider to review          Past Medical/Surgical History:  Past Medical History:   Diagnosis Date     CAD (coronary artery disease)     small vessel disease     Carotid artery stenosis      Chronic stable angina (H)      HTN (hypertension)      Hyperlipidemia      Mumps      Past Surgical History:   Procedure Laterality Date     APPENDECTOMY       APPENDECTOMY       CORONARY ANGIOGRAPHY ADULT ORDER  2011, 2016    med tx, small vessels     CYSTOSCOPY       EYE SURGERY  03/2017    retinal detachment      EYE SURGERY      lasix     NOSE SURGERY       SURGICAL HISTORY OF -       tonsils     SURGICAL HISTORY OF -       cystoscopy for kidney stones     SURGICAL HISTORY OF -       nasal surgery     TONSILLECTOMY         Social History:  Social History     Socioeconomic History      Marital status:      Spouse name: Not on file     Number of children: Not on file     Years of education: Not on file     Highest education level: Not on file   Occupational History     Not on file   Tobacco Use     Smoking status: Never Smoker     Smokeless tobacco: Never Used   Substance and Sexual Activity     Alcohol use: Yes     Alcohol/week: 7.0 standard drinks     Types: 7 Standard drinks or equivalent per week     Comment: 1 per day     Drug use: Yes     Types: Marijuana     Comment: nightly help them sleep     Sexual activity: Yes     Partners: Female   Other Topics Concern      Service Not Asked     Blood Transfusions Not Asked     Caffeine Concern No     Comment: 1 cup      Occupational Exposure Not Asked     Hobby Hazards Not Asked     Sleep Concern Yes     Comment: sleep apnea , c-pap      Stress Concern Not Asked     Weight Concern Not Asked     Special Diet No     Back Care Not Asked     Exercise No     Bike Helmet Not Asked     Seat Belt Not Asked     Self-Exams Not Asked     Parent/sibling w/ CABG, MI or angioplasty before 65F 55M? Yes     Comment: unknown   Social History Narrative     Not on file     Social Determinants of Health     Financial Resource Strain:      Difficulty of Paying Living Expenses:    Food Insecurity:      Worried About Running Out of Food in the Last Year:      Ran Out of Food in the Last Year:    Transportation Needs:      Lack of Transportation (Medical):      Lack of Transportation (Non-Medical):    Physical Activity:      Days of Exercise per Week:      Minutes of Exercise per Session:    Stress:      Feeling of Stress :    Social Connections:      Frequency of Communication with Friends and Family:      Frequency of Social Gatherings with Friends and Family:      Attends Confucianism Services:      Active Member of Clubs or Organizations:      Attends Club or Organization Meetings:      Marital Status:    Intimate Partner Violence:      Fear of Current or  Ex-Partner:      Emotionally Abused:      Physically Abused:      Sexually Abused:        Family History:  Family History   Problem Relation Age of Onset     C.A.D. Mother      C.A.D. Maternal Grandfather      Cancer Father         lung cancer, smoker.        Review of Systems:   Answers for HPI/ROS submitted by the patient  General Symptoms: No  Skin Symptoms: No  HENT Symptoms: No  EYE SYMPTOMS: No  HEART SYMPTOMS: No  LUNG SYMPTOMS: No  INTESTINAL SYMPTOMS: No  URINARY SYMPTOMS: No  REPRODUCTIVE SYMPTOMS: No  SKELETAL SYMPTOMS: No  BLOOD SYMPTOMS: No  NERVOUS SYSTEM SYMPTOMS: No  MENTAL HEALTH SYMPTOMS: No        Physical Examination:  Vitals: There were no vitals taken for this visit.  BMI= There is no height or weight on file to calculate BMI.    Per epic December 15, 2020: Height: 6 feet 1.4 inches; weight: 250 pounds; BMI: 33 kg/m2    Kimberly Total Score 10/20/2021   Total score - Kimberly 2     General: No apparent distress, appropriately groomed  Head: Normocephalic, atraumatic  Chest: No cough, no audible wheezing, able to talk in full sentences  Psych: coherent speech, normal rate and volume, able to articulate logical thoughts, able   to abstract reason, no tangential thoughts, no hallucinations   or delusions  His affect is normal  Neuro:  Mental status: Alert and  Oriented X 3  Speech: normal       OTHER TESTS/LABS:   I have reviewed the labs and made my comment in the assessment and plan.  Last Comprehensive Metabolic Panel:  Sodium   Date Value Ref Range Status   12/11/2020 139 133 - 144 mmol/L Final     Potassium   Date Value Ref Range Status   12/11/2020 4.4 3.4 - 5.3 mmol/L Final     Chloride   Date Value Ref Range Status   12/11/2020 106 94 - 109 mmol/L Final     Carbon Dioxide   Date Value Ref Range Status   12/11/2020 26 20 - 32 mmol/L Final     Anion Gap   Date Value Ref Range Status   12/11/2020 7 3 - 14 mmol/L Final     Glucose   Date Value Ref Range Status   12/11/2020 100 (H) 70 - 99 mg/dL  Final     Urea Nitrogen   Date Value Ref Range Status   12/11/2020 21 7 - 30 mg/dL Final     Creatinine   Date Value Ref Range Status   12/11/2020 1.08 0.66 - 1.25 mg/dL Final     GFR Estimate   Date Value Ref Range Status   12/11/2020 67 >60 mL/min/[1.73_m2] Final     Comment:     Non  GFR Calc  Starting 12/18/2018, serum creatinine based estimated GFR (eGFR) will be   calculated using the Chronic Kidney Disease Epidemiology Collaboration   (CKD-EPI) equation.       Calcium   Date Value Ref Range Status   12/11/2020 9.4 8.5 - 10.1 mg/dL Final     Bilirubin Total   Date Value Ref Range Status   12/11/2020 0.5 0.2 - 1.3 mg/dL Final     Alkaline Phosphatase   Date Value Ref Range Status   12/11/2020 80 40 - 150 U/L Final     ALT   Date Value Ref Range Status   12/11/2020 27 0 - 70 U/L Final     AST   Date Value Ref Range Status   12/11/2020 17 0 - 45 U/L Final     TSH   Date Value Ref Range Status   12/11/2020 1.68 0.40 - 4.00 mU/L Final         Impression/Plan:    Previously diagnosed sleep apnea (severity unknown since PSG report is unavailable), with several medical comorbidties including obesity, hypertension, hyperlipidemia, coronary artery disease, carotid artery stenosis, peripheral arterial disease.    He has been compliant with CPAP treatment  and reports positive benefits with CPAP use.  Based on the compliance measures from the John Respironics auto CPAP device(8-15 cm H2O) that he was using until August 28, 2021, his DARYL is well controlled.  His CPAP device has been recalled and is more than 8 years old and he needs replacement CPAP equipment.  Since previous sleep study report is not available, he will need a sleep study in order to qualify for CPAP.  We discussed the option of HST versus in lab sleep study.  He prefers the HST.  Orders generated for HST.  If his insurance does not cover HST will obtain in lab sleep study. in the interim he will continue using his old DeVilbiss device.   "Patient was instructed to bring the DeVilbiss device to [facility] to make sure that the pressure settings on the device range between 8-15 cmH2O. Patient was instructed to hold off from using the CPAP device for a couple nights preceding the sleep study and he was apprehensive about his ability to sleep without the device.  He was recommended not to use the CPAP on the night of the home sleep study for better accuracy and can resume the use after the HST is completed.  Plan is to communicate results of sleep study via My Chart within 1 week and  provide prescription for  auto CPAP 8-15 cmH2O which was found to be effective per the compliance download.  We discussed about the wait list with the current device shortage. He will follow up at sleep clinic to review compliance measures if it is required by his insurance in approximately 6 to 8 weeks after he obtains the replacement equipment.   Otherwise he will follow-up at sleep clinic in 1 year or sooner if any concerns.    Obesity: We discussed weight management with healthy diet, and exercise.    Patient was strongly advised to avoid driving, operating any heavy machinery or other hazardous situations while drowsy or sleepy.  Patient was counseled on the importance of driving while alert, to pull over if drowsy, or nap before getting into the vehicle if sleepy.        CC: No ref. provider found    The above note was dictated using voice recognition software. Although reviewed after completion, some word and grammatical error may remain . Please contact the author for any clarifications.    \"I spent a total of 60  Minutes with Hood Arizmendi during today's video visit most  of this time was spent counseling the patient and  coordinating care regarding  DARYL, HST/PSG, CPAP treatment , weight management , going over PAP download  and chart review, including documentation and further activities as noted above.\"     MD REGINALDO Bryan " [facility]  [id number] [place] [id number] [phone]  Dept: [phone]

## 2021-10-27 ENCOUNTER — VIRTUAL VISIT (OUTPATIENT)
Dept: SLEEP MEDICINE | Facility: CLINIC | Age: 76
End: 2021-10-27
Payer: COMMERCIAL

## 2021-10-27 DIAGNOSIS — G47.33 OSA ON CPAP: Primary | ICD-10-CM

## 2021-10-27 PROCEDURE — 99215 OFFICE O/P EST HI 40 MIN: CPT | Mod: 95 | Performed by: INTERNAL MEDICINE

## 2021-10-28 ENCOUNTER — TELEPHONE (OUTPATIENT)
Dept: SLEEP MEDICINE | Facility: CLINIC | Age: 76
End: 2021-10-28

## 2021-10-28 NOTE — NURSING NOTE
Reach out to patient via phone and he stated he preferred to do the [employer]. Patient informed that [facility] can not be his DME company they don't accept Medicare HST.  Message routed to Callie Valdivia.       [facility]

## 2021-10-28 NOTE — TELEPHONE ENCOUNTER
Received message from Dr Stevens to check and adjust pressures on patient's Devilbiss CPAP to 8-15 cm H2O. Left patient a message to return call and let us know if he needs to transfer DME providers.

## 2021-11-03 ENCOUNTER — OFFICE VISIT (OUTPATIENT)
Dept: CARDIOLOGY | Facility: CLINIC | Age: 76
End: 2021-11-03
Payer: COMMERCIAL

## 2021-11-03 VITALS
WEIGHT: 224 LBS | HEART RATE: 65 BPM | HEIGHT: 74 IN | SYSTOLIC BLOOD PRESSURE: 100 MMHG | DIASTOLIC BLOOD PRESSURE: 65 MMHG | BODY MASS INDEX: 28.75 KG/M2

## 2021-11-03 DIAGNOSIS — I25.10 CORONARY ARTERY DISEASE INVOLVING NATIVE CORONARY ARTERY OF NATIVE HEART WITHOUT ANGINA PECTORIS: ICD-10-CM

## 2021-11-03 PROCEDURE — 99214 OFFICE O/P EST MOD 30 MIN: CPT | Performed by: INTERNAL MEDICINE

## 2021-11-03 RX ORDER — METOPROLOL SUCCINATE 25 MG/1
25 TABLET, EXTENDED RELEASE ORAL DAILY
Qty: 90 TABLET | Refills: 3 | Status: SHIPPED | OUTPATIENT
Start: 2021-11-03 | End: 2022-09-16

## 2021-11-03 RX ORDER — ISOSORBIDE MONONITRATE 30 MG/1
30 TABLET, EXTENDED RELEASE ORAL DAILY
Qty: 90 TABLET | Refills: 3 | Status: SHIPPED | OUTPATIENT
Start: 2021-11-03 | End: 2022-09-16

## 2021-11-03 ASSESSMENT — MIFFLIN-ST. JEOR: SCORE: 1825.81

## 2021-11-03 NOTE — PROGRESS NOTES
Cardiology    I had the pleasure to follow up with your patient, Mr. Hood Arizmendi, in the Cardiovascular Medicine Clinic.  As you recall, he is a very pleasant 76-year-old gentleman who I got acquainted with when he moved to the [place] area.  He has known coronary artery disease, namely small vessel disease.  The posterolateral branch is 100% occluded with moderate disease elsewhere.  His coronary angiogram was performed in 2016, which confirms these findings.         From a cardiac perspective, this gentleman is doing well.  He has not noticed any chest pain, PND, orthopnea, syncope or any other cardiac related concerns.    He has intentionally lost over 20 pounds over the past year.  Here for his routine follow-up visit.         Echo 2020  1. Normal left ventricular size and systolic function. LVEF 55-60%.  2. No regional wall motion abnormalities.  3. Normal right ventricular size and systolic function.  4. No significant valve disease.      Carotid US: 2020    Right side:      Plaque Morphology: Echogenic, smooth         Proximal CCA: 124/28 cm/sec     Mid-distal CCA: 91/22 cm/sec     External CA: 117/16 cm/sec     Proximal ICA: 76/14 cm/sec     Mid ICA: 57/22 cm/sec     Distal ICA: 59/17 cm/sec     Vertebral artery: 36/14 cm/sec, antegrade     ICA/CCA ratio: 0.8     Left side:      Plaque Morphology: Echogenic, smooth        Proximal CCA: 95/25 cm/sec     Mid-distal CCA: 93/29 cm/sec     External CA: 75/17 cm/sec     Proximal ICA: 90/23 cm/sec     Mid ICA: 56/22 cm/sec     Distal ICA: 60/24 cm/sec     Vertebral artery: 34/8 cm/sec, antegrade     ICA/CCA ratio: 1.0                                                                      Impression:     1. Degree of stenosis of the internal carotid artery: Mild echogenic  smooth plaque. By velocity criteria there is <50% diameter stenosis of  the internal carotid artery. Similar to prior study dated 4/9/2018.     2. Left side: Mild echogenic smooth plaque.  "By velocity criteria there  is <50% diameter stenosis of the internal carotid artery. Similar to  prior study dated 4/9/2018.        PHYSICAL EXAMINATION:   VITAL SIGNS:  /65   Pulse 65   Ht 1.88 m (6' 2\")   Wt 101.6 kg (224 lb)   BMI 28.76 kg/m      GENERAL: Healthy, alert and no distress  EYES: Eyes grossly normal to inspection.  No discharge or erythema, or obvious scleral/conjunctival abnormalities.  RESP: No audible wheeze, cough, or visible cyanosis.  No visible retractions or increased work of breathing.    SKIN: Visible skin clear. No significant rash, abnormal pigmentation or lesions.  NEURO: Cranial nerves grossly intact.  Mentation and speech appropriate for age.  PSYCH: Mentation appears normal, affect normal/bright, judgement and insight intact, normal speech and appearance well-groomed.         ASSESSMENT:   1.  Chest discomfort with known single-vessel occlusive coronary artery disease, namely ostial posterolateral branch with ipsilateral collateral filling by angiogram in 2016. Resolved  2.  Mild disease elsewhere.   3.  Normal LV systolic function.   4.  Mild carotid artery stenosis.   5.  Eye surgery    6.  Hypertension.   7.  Hyperlipidemia.   8.  Prostate Ca S/P radiotherapy     RECOMMENDATIONS:   1.  Atherosclerotic coronary artery disease, stable.  He is on appropriate guideline-directed medical therapy.  L  No return of symptoms.   2.  Peripheral arterial disease. Stable carotid ultrasound   3.  Hypertension: Given his weight loss we will decrease his Toprol to 25 mg daily along with decreasing his isosorbide mononitrate to 30 mg daily.  4.  Hyperlipidemia.  Let us continue with statin therapy.  He is due for cholesterol check this year.  I will leave this in your capable hands.   5.  We will have him return to clinic in 1 year's time with preclinic echocardiogram      Charlotte Kay MD            "

## 2021-11-03 NOTE — LETTER
11/3/2021    Michael Oliver MD  [address] [place] [address]    RE: Hood Arizmendi       Dear Colleague,    I had the pleasure of seeing Hood Arizmendi in the [facility].    Cardiology    I had the pleasure to follow up with your patient, Mr. Hood Arizmendi, in the Cardiovascular Medicine Clinic.  As you recall, he is a very pleasant 76-year-old gentleman who I got acquainted with when he moved to the [place] area.  He has known coronary artery disease, namely small vessel disease.  The posterolateral branch is 100% occluded with moderate disease elsewhere.  His coronary angiogram was performed in 2016, which confirms these findings.         From a cardiac perspective, this gentleman is doing well.  He has not noticed any chest pain, PND, orthopnea, syncope or any other cardiac related concerns.    He has intentionally lost over 20 pounds over the past year.  Here for his routine follow-up visit.         Echo 2020  1. Normal left ventricular size and systolic function. LVEF 55-60%.  2. No regional wall motion abnormalities.  3. Normal right ventricular size and systolic function.  4. No significant valve disease.      Carotid US: 2020    Right side:      Plaque Morphology: Echogenic, smooth         Proximal CCA: 124/28 cm/sec     Mid-distal CCA: 91/22 cm/sec     External CA: 117/16 cm/sec     Proximal ICA: 76/14 cm/sec     Mid ICA: 57/22 cm/sec     Distal ICA: 59/17 cm/sec     Vertebral artery: 36/14 cm/sec, antegrade     ICA/CCA ratio: 0.8     Left side:      Plaque Morphology: Echogenic, smooth        Proximal CCA: 95/25 cm/sec     Mid-distal CCA: 93/29 cm/sec     External CA: 75/17 cm/sec     Proximal ICA: 90/23 cm/sec     Mid ICA: 56/22 cm/sec     Distal ICA: 60/24 cm/sec     Vertebral artery: 34/8 cm/sec, antegrade     ICA/CCA ratio: 1.0                                                                      Impression:     1. Degree of  "stenosis of the internal carotid artery: Mild echogenic  smooth plaque. By velocity criteria there is <50% diameter stenosis of  the internal carotid artery. Similar to prior study dated 4/9/2018.     2. Left side: Mild echogenic smooth plaque. By velocity criteria there  is <50% diameter stenosis of the internal carotid artery. Similar to  prior study dated 4/9/2018.        PHYSICAL EXAMINATION:   VITAL SIGNS:  /65   Pulse 65   Ht 1.88 m (6' 2\")   Wt 101.6 kg (224 lb)   BMI 28.76 kg/m      GENERAL: Healthy, alert and no distress  EYES: Eyes grossly normal to inspection.  No discharge or erythema, or obvious scleral/conjunctival abnormalities.  RESP: No audible wheeze, cough, or visible cyanosis.  No visible retractions or increased work of breathing.    SKIN: Visible skin clear. No significant rash, abnormal pigmentation or lesions.  NEURO: Cranial nerves grossly intact.  Mentation and speech appropriate for age.  PSYCH: Mentation appears normal, affect normal/bright, judgement and insight intact, normal speech and appearance well-groomed.         ASSESSMENT:   1.  Chest discomfort with known single-vessel occlusive coronary artery disease, namely ostial posterolateral branch with ipsilateral collateral filling by angiogram in 2016. Resolved  2.  Mild disease elsewhere.   3.  Normal LV systolic function.   4.  Mild carotid artery stenosis.   5.  Eye surgery    6.  Hypertension.   7.  Hyperlipidemia.   8.  Prostate Ca S/P radiotherapy     RECOMMENDATIONS:   1.  Atherosclerotic coronary artery disease, stable.  He is on appropriate guideline-directed medical therapy.  L  No return of symptoms.   2.  Peripheral arterial disease. Stable carotid ultrasound   3.  Hypertension: Given his weight loss we will decrease his Toprol to 25 mg daily along with decreasing his isosorbide mononitrate to 30 mg daily.  4.  Hyperlipidemia.  Let us continue with statin therapy.  He is due for cholesterol check this year.  I " will leave this in your capable hands.   5.  We will have him return to clinic in 1 year's time with preclinic echocardiogram      Charlotte Kay MD

## 2021-11-05 ENCOUNTER — TRANSFERRED RECORDS (OUTPATIENT)
Dept: HEALTH INFORMATION MANAGEMENT | Facility: CLINIC | Age: 76
End: 2021-11-05
Payer: COMMERCIAL

## 2021-11-05 ENCOUNTER — OFFICE VISIT (OUTPATIENT)
Dept: PEDIATRICS | Facility: CLINIC | Age: 76
End: 2021-11-05
Payer: COMMERCIAL

## 2021-11-05 DIAGNOSIS — Z91.199 NO-SHOW FOR APPOINTMENT: Primary | ICD-10-CM

## 2021-11-05 NOTE — PROGRESS NOTES
This appointment was made by mistake.    PENNY Dinh at 3:59 PM on November 10, 2021  [facility] Guide  [phone]      Fax came in from [facility]. Sent to abstracting.    PENNY Dinh at 3:32 PM on November 5, 2021  [facility] Guide  [phone]      This patient was a no show for this scheduled appointment.

## 2021-11-10 ENCOUNTER — OFFICE VISIT (OUTPATIENT)
Dept: SLEEP MEDICINE | Facility: CLINIC | Age: 76
End: 2021-11-10
Attending: INTERNAL MEDICINE
Payer: COMMERCIAL

## 2021-11-10 DIAGNOSIS — G47.33 OSA ON CPAP: ICD-10-CM

## 2021-11-10 PROCEDURE — 95800 SLP STDY UNATTENDED: CPT | Performed by: INTERNAL MEDICINE

## 2021-11-16 ENCOUNTER — TRANSFERRED RECORDS (OUTPATIENT)
Dept: HEALTH INFORMATION MANAGEMENT | Facility: CLINIC | Age: 76
End: 2021-11-16
Payer: COMMERCIAL

## 2021-11-16 ENCOUNTER — DOCUMENTATION ONLY (OUTPATIENT)
Dept: SLEEP MEDICINE | Facility: CLINIC | Age: 76
End: 2021-11-16
Payer: COMMERCIAL

## 2021-11-16 DIAGNOSIS — G47.33 OSA ON CPAP: Primary | ICD-10-CM

## 2021-11-17 NOTE — PROGRESS NOTES
Watch pat has been scored using rule 1B, 4%.   Patient to follow up with provider to determine appropriate therapy.     Pat AHI: 7.0    Ordering Provider: Dr. Rodney Valdivia, [facility], [facility]  Sleep Technologist  11/17/21

## 2021-11-27 NOTE — PROCEDURES
"WatchPAT - HOME SLEEP STUDY INTERPRETATION    Patient: Hood Arizmendi  MRN: [id number]  YOB: 1945  Study Date: 11/16/2021  Referring Provider: Michael Oliver  Ordering Provider: Anand Maxwell MD    Chain of custody patient verification was not enabled.  Chain of custody verification was not present throughout the entire study.     Indications for Home Study: Hood Arizmendi is a 76 year old male with a history of DARYL, coronary artery disease, hypertension, hyperlipidemia, carotid artery stenosis,who needs replacement CPAP equipment. His CPAP device is the John Respironics device that has been  recalled and  he obtained it more than 8 years ago. Home sleep study is obtained in order to qualify for CPAP treatment.    Estimated body mass index is 28.76 kg/m  as calculated from the following:    Height as of 11/3/21: 1.88 m (6' 2\").    Weight as of 11/3/21: 101.6 kg (224 lb).  Total score - [place]: 2 (10/20/2021  9:01 AM)      Data: A full night home sleep study was performed recording the standard physiologic parameters including peripheral arterial tonometry (PAT), sound/snoring, body position,  movement, sound, and oxygen saturation by pulse oximetry. Pulse rate was estimated by oximetry recording. Sleep staging (wake, REM, light, and deep sleep) was derived from PAT signal.This study was considered adequate based on > 4 hours of quality oximetry and respiratory recording. As specified by the AASM Manual for the Scoring of Sleep and Associated events, version 2.3, Rule VIII.D 1B, 4% oxygen desaturation scoring for hypopneas is used as a standard of care on all home sleep apnea testing.    Total Recording Time: 7 hrs, 30 min  Total Sleep Time: 6 hrs, 06 min  % of Sleep Time REM: 8.0%    Respiratory:  Snoring: Snoring was present.  Respiratory events: The PAT respiratory disturbance index [pRDI] was 12.4 events per hour.  The PAT apnea/hypopnea index [pAHI] was 7.0 events per " hour.  DIANE was 6.5 events per hour.    Sleep Associated Hypoxemia: sustained hypoxemia was not present. Mean oxygen saturation was 91%.  Minimum was 85%.  Time with saturation at or less than 88% was 4.8 minutes.    Heart Rate: By pulse oximetry, the mean pulse rate was normal at 64 bpm.  The minimum pulse rate was 50 bpm and the maximum pulse rate was 91 bpm.      Position: Percent of time spent: supine -65.9%, prone -1.0%, on right -4.5%, on left -28.6%.  pAHI was 3.5 per hour supine, n/a prone, 45.1 per hour on right side, and 8.6 per hour on left side.     Assessment:     Mild obstructive sleep apnea.    Sleep associated hypoxemia was not present.    Recommendations:    Consider auto-CPAP at 8-15 cmH2O, at which DARYL was optimally controlled based on the recent compliance download data     Suggest optimizing sleep hygiene and avoiding sleep deprivation.    Weight management.    Diagnosis Code(s): Obstructive Sleep Apnea G47.33, Snoring R06.83    Anand Maxwell MD, November 27, 2021   Diplomate, American Board of Internal Medicine, Sleep Medicine

## 2021-11-30 ENCOUNTER — CARE COORDINATION (OUTPATIENT)
Dept: SLEEP MEDICINE | Facility: CLINIC | Age: 76
End: 2021-11-30
Payer: COMMERCIAL

## 2021-12-27 ENCOUNTER — TRANSFERRED RECORDS (OUTPATIENT)
Dept: HEALTH INFORMATION MANAGEMENT | Facility: CLINIC | Age: 76
End: 2021-12-27
Payer: COMMERCIAL

## 2022-01-05 DIAGNOSIS — G47.33 OBSTRUCTIVE SLEEP APNEA (ADULT) (PEDIATRIC): Primary | ICD-10-CM

## 2022-01-05 DIAGNOSIS — Z99.89 DEPENDENCE ON OTHER ENABLING MACHINE: ICD-10-CM

## 2022-01-21 ENCOUNTER — TRANSFERRED RECORDS (OUTPATIENT)
Dept: HEALTH INFORMATION MANAGEMENT | Facility: CLINIC | Age: 77
End: 2022-01-21
Payer: COMMERCIAL

## 2022-02-12 ENCOUNTER — HEALTH MAINTENANCE LETTER (OUTPATIENT)
Age: 77
End: 2022-02-12

## 2022-03-17 DIAGNOSIS — I25.10 CORONARY ARTERY DISEASE INVOLVING NATIVE CORONARY ARTERY OF NATIVE HEART WITHOUT ANGINA PECTORIS: ICD-10-CM

## 2022-03-18 RX ORDER — ROSUVASTATIN CALCIUM 20 MG/1
TABLET, COATED ORAL
Qty: 90 TABLET | Refills: 0 | Status: SHIPPED | OUTPATIENT
Start: 2022-03-18 | End: 2022-04-14

## 2022-03-18 RX ORDER — AMLODIPINE BESYLATE 2.5 MG/1
TABLET ORAL
Qty: 90 TABLET | Refills: 0 | Status: SHIPPED | OUTPATIENT
Start: 2022-03-18 | End: 2022-04-14

## 2022-03-18 NOTE — TELEPHONE ENCOUNTER
Routing refill request to provider for review/approval because:  Labs not current: creatinine, LDL    Visit is up to date. RN will issue one time 90 day allie refill. Please advise on labs.   Vin REYES RN

## 2022-03-25 ENCOUNTER — TRANSFERRED RECORDS (OUTPATIENT)
Dept: HEALTH INFORMATION MANAGEMENT | Facility: CLINIC | Age: 77
End: 2022-03-25
Payer: COMMERCIAL

## 2022-04-13 SDOH — ECONOMIC STABILITY: INCOME INSECURITY: HOW HARD IS IT FOR YOU TO PAY FOR THE VERY BASICS LIKE FOOD, HOUSING, MEDICAL CARE, AND HEATING?: NOT HARD AT ALL

## 2022-04-13 SDOH — ECONOMIC STABILITY: FOOD INSECURITY: WITHIN THE PAST 12 MONTHS, THE FOOD YOU BOUGHT JUST DIDN'T LAST AND YOU DIDN'T HAVE MONEY TO GET MORE.: NEVER TRUE

## 2022-04-13 SDOH — HEALTH STABILITY: PHYSICAL HEALTH: ON AVERAGE, HOW MANY MINUTES DO YOU ENGAGE IN EXERCISE AT THIS LEVEL?: 30 MIN

## 2022-04-13 SDOH — ECONOMIC STABILITY: INCOME INSECURITY: IN THE LAST 12 MONTHS, WAS THERE A TIME WHEN YOU WERE NOT ABLE TO PAY THE MORTGAGE OR RENT ON TIME?: NO

## 2022-04-13 SDOH — ECONOMIC STABILITY: FOOD INSECURITY: WITHIN THE PAST 12 MONTHS, YOU WORRIED THAT YOUR FOOD WOULD RUN OUT BEFORE YOU GOT MONEY TO BUY MORE.: NEVER TRUE

## 2022-04-13 SDOH — ECONOMIC STABILITY: TRANSPORTATION INSECURITY
IN THE PAST 12 MONTHS, HAS THE LACK OF TRANSPORTATION KEPT YOU FROM MEDICAL APPOINTMENTS OR FROM GETTING MEDICATIONS?: NO

## 2022-04-13 SDOH — HEALTH STABILITY: PHYSICAL HEALTH: ON AVERAGE, HOW MANY DAYS PER WEEK DO YOU ENGAGE IN MODERATE TO STRENUOUS EXERCISE (LIKE A BRISK WALK)?: 5 DAYS

## 2022-04-13 SDOH — ECONOMIC STABILITY: TRANSPORTATION INSECURITY
IN THE PAST 12 MONTHS, HAS LACK OF TRANSPORTATION KEPT YOU FROM MEETINGS, WORK, OR FROM GETTING THINGS NEEDED FOR DAILY LIVING?: NO

## 2022-04-13 ASSESSMENT — ENCOUNTER SYMPTOMS
HEMATURIA: 0
JOINT SWELLING: 0
ABDOMINAL PAIN: 0
HEADACHES: 0
MYALGIAS: 0
PALPITATIONS: 0
COUGH: 0
WEAKNESS: 0
DIARRHEA: 0
ARTHRALGIAS: 0
DIZZINESS: 0
HEARTBURN: 0
EYE PAIN: 0
SORE THROAT: 0
NERVOUS/ANXIOUS: 0
CHILLS: 0
CONSTIPATION: 0
FEVER: 0
DYSURIA: 0
FREQUENCY: 0
SHORTNESS OF BREATH: 0
NAUSEA: 0
PARESTHESIAS: 0
HEMATOCHEZIA: 0

## 2022-04-13 ASSESSMENT — LIFESTYLE VARIABLES
SKIP TO QUESTIONS 9-10: 1
HOW MANY STANDARD DRINKS CONTAINING ALCOHOL DO YOU HAVE ON A TYPICAL DAY: 1 OR 2
AUDIT-C TOTAL SCORE: 2
HOW OFTEN DO YOU HAVE A DRINK CONTAINING ALCOHOL: 2-4 TIMES A MONTH
HOW OFTEN DO YOU HAVE SIX OR MORE DRINKS ON ONE OCCASION: NEVER

## 2022-04-13 ASSESSMENT — SOCIAL DETERMINANTS OF HEALTH (SDOH)
DO YOU BELONG TO ANY CLUBS OR ORGANIZATIONS SUCH AS CHURCH GROUPS UNIONS, FRATERNAL OR ATHLETIC GROUPS, OR SCHOOL GROUPS?: YES
HOW OFTEN DO YOU GET TOGETHER WITH FRIENDS OR RELATIVES?: ONCE A WEEK
HOW OFTEN DO YOU ATTEND CHURCH OR RELIGIOUS SERVICES?: MORE THAN 4 TIMES PER YEAR
IN A TYPICAL WEEK, HOW MANY TIMES DO YOU TALK ON THE PHONE WITH FAMILY, FRIENDS, OR NEIGHBORS?: ONCE A WEEK

## 2022-04-13 ASSESSMENT — ACTIVITIES OF DAILY LIVING (ADL): CURRENT_FUNCTION: NO ASSISTANCE NEEDED

## 2022-04-14 ENCOUNTER — OFFICE VISIT (OUTPATIENT)
Dept: PEDIATRICS | Facility: CLINIC | Age: 77
End: 2022-04-14
Payer: COMMERCIAL

## 2022-04-14 VITALS
SYSTOLIC BLOOD PRESSURE: 102 MMHG | OXYGEN SATURATION: 94 % | WEIGHT: 229.5 LBS | DIASTOLIC BLOOD PRESSURE: 60 MMHG | HEIGHT: 74 IN | TEMPERATURE: 98.3 F | RESPIRATION RATE: 20 BRPM | BODY MASS INDEX: 29.45 KG/M2 | HEART RATE: 64 BPM

## 2022-04-14 DIAGNOSIS — N20.0 KIDNEY STONES: ICD-10-CM

## 2022-04-14 DIAGNOSIS — C61 PROSTATE CANCER (H): ICD-10-CM

## 2022-04-14 DIAGNOSIS — N40.1 BENIGN PROSTATIC HYPERPLASIA WITH LOWER URINARY TRACT SYMPTOMS, SYMPTOM DETAILS UNSPECIFIED: ICD-10-CM

## 2022-04-14 DIAGNOSIS — I73.9 PAD (PERIPHERAL ARTERY DISEASE) (H): ICD-10-CM

## 2022-04-14 DIAGNOSIS — E78.5 HYPERLIPIDEMIA WITH TARGET LDL LESS THAN 100: ICD-10-CM

## 2022-04-14 DIAGNOSIS — I65.29 STENOSIS OF CAROTID ARTERY, UNSPECIFIED LATERALITY: ICD-10-CM

## 2022-04-14 DIAGNOSIS — I25.10 CORONARY ARTERY DISEASE INVOLVING NATIVE CORONARY ARTERY OF NATIVE HEART WITHOUT ANGINA PECTORIS: ICD-10-CM

## 2022-04-14 DIAGNOSIS — I10 ESSENTIAL HYPERTENSION: ICD-10-CM

## 2022-04-14 DIAGNOSIS — K51.919 ULCERATIVE COLITIS WITH COMPLICATION, UNSPECIFIED LOCATION (H): ICD-10-CM

## 2022-04-14 DIAGNOSIS — H35.3290 EXUDATIVE AGE-RELATED MACULAR DEGENERATION, UNSPECIFIED LATERALITY, UNSPECIFIED STAGE (H): ICD-10-CM

## 2022-04-14 DIAGNOSIS — Z00.00 ENCOUNTER FOR MEDICARE ANNUAL WELLNESS EXAM: Primary | ICD-10-CM

## 2022-04-14 PROBLEM — Z71.89 ACP (ADVANCE CARE PLANNING): Chronic | Status: RESOLVED | Noted: 2017-06-14 | Resolved: 2022-04-14

## 2022-04-14 PROCEDURE — 80061 LIPID PANEL: CPT | Performed by: INTERNAL MEDICINE

## 2022-04-14 PROCEDURE — 80053 COMPREHEN METABOLIC PANEL: CPT | Performed by: INTERNAL MEDICINE

## 2022-04-14 PROCEDURE — 36415 COLL VENOUS BLD VENIPUNCTURE: CPT | Performed by: INTERNAL MEDICINE

## 2022-04-14 PROCEDURE — 99213 OFFICE O/P EST LOW 20 MIN: CPT | Mod: 25 | Performed by: INTERNAL MEDICINE

## 2022-04-14 PROCEDURE — G0439 PPPS, SUBSEQ VISIT: HCPCS | Performed by: INTERNAL MEDICINE

## 2022-04-14 RX ORDER — TAMSULOSIN HYDROCHLORIDE 0.4 MG/1
0.4 CAPSULE ORAL DAILY
Qty: 90 CAPSULE | Refills: 3 | Status: SHIPPED | OUTPATIENT
Start: 2022-04-14 | End: 2023-05-30

## 2022-04-14 RX ORDER — ROSUVASTATIN CALCIUM 20 MG/1
20 TABLET, COATED ORAL DAILY
Qty: 90 TABLET | Refills: 3 | Status: SHIPPED | OUTPATIENT
Start: 2022-04-14 | End: 2022-07-21

## 2022-04-14 RX ORDER — HYDROCODONE BITARTRATE AND ACETAMINOPHEN 5; 325 MG/1; MG/1
1 TABLET ORAL EVERY 6 HOURS PRN
Qty: 10 TABLET | Refills: 0 | Status: SHIPPED | OUTPATIENT
Start: 2022-04-14 | End: 2022-04-17

## 2022-04-14 RX ORDER — AMLODIPINE BESYLATE 2.5 MG/1
2.5 TABLET ORAL DAILY
Qty: 90 TABLET | Refills: 3 | Status: SHIPPED | OUTPATIENT
Start: 2022-04-14 | End: 2023-05-04

## 2022-04-14 ASSESSMENT — ENCOUNTER SYMPTOMS
MYALGIAS: 0
WEAKNESS: 0
ARTHRALGIAS: 0
NAUSEA: 0
NERVOUS/ANXIOUS: 0
JOINT SWELLING: 0
COUGH: 0
DIZZINESS: 0
PARESTHESIAS: 0
FEVER: 0
HEADACHES: 0
HEARTBURN: 0
PALPITATIONS: 0
CHILLS: 0
SHORTNESS OF BREATH: 0
SORE THROAT: 0
DIARRHEA: 0
EYE PAIN: 0
ABDOMINAL PAIN: 0
DYSURIA: 0
HEMATOCHEZIA: 0
CONSTIPATION: 0
HEMATURIA: 0
FREQUENCY: 0

## 2022-04-14 ASSESSMENT — ACTIVITIES OF DAILY LIVING (ADL): CURRENT_FUNCTION: NO ASSISTANCE NEEDED

## 2022-04-14 NOTE — PROGRESS NOTES
"SUBJECTIVE:   Hood Arizmendi is a 76 year old male who presents for Preventive Visit.    Are you in the first 12 months of your Medicare coverage?  No    Healthy Habits:     In general, how would you rate your overall health?  Excellent    Frequency of exercise:  4-5 days/week    Duration of exercise:  30-45 minutes    Do you usually eat at least 4 servings of fruit and vegetables a day, include whole grains    & fiber and avoid regularly eating high fat or \"junk\" foods?  No    Taking medications regularly:  Yes    Medication side effects:  None    Ability to successfully perform activities of daily living:  No assistance needed    Home Safety:  Throw rugs in the hallway    Hearing Impairment:  Difficulty understanding soft or whispered speech    In the past 6 months, have you been bothered by leaking of urine?  No    In general, how would you rate your overall mental or emotional health?  Excellent      PHQ-2 Total Score: 2    Additional concerns today:  No    Do you feel safe in your environment? Yes    Have you ever done Advance Care Planning? (For example, a Health Directive, POLST, or a discussion with a medical provider or your loved ones about your wishes): No, advance care planning information given to patient to review.  Advanced care planning was discussed at today's visit.    Fall risk  Fallen 2 or more times in the past year?: No  Any fall with injury in the past year?: No    Cognitive Screening   1) Repeat 3 items (Leader, Season, Table)    2) Clock draw: NORMAL  3) 3 item recall: Recalls 3 objects  Results: 3 items recalled: COGNITIVE IMPAIRMENT LESS LIKELY    Mini-CogTM Copyright BETH Pepe. Licensed by the author for use in [facility]; reprinted with permission (harmony@.[facility]). All rights reserved.      Do you have sleep apnea, excessive snoring or daytime drowsiness?: yes    Reviewed and updated as needed this visit by clinical staff   Tobacco  Allergies    Med Hx  Surg Hx  Fam Hx   "        Reviewed and updated as needed this visit by Provider                   Social History     Tobacco Use     Smoking status: Never Smoker     Smokeless tobacco: Never Used   Substance Use Topics     Alcohol use: Yes     Types: 7 Standard drinks or equivalent per week     Comment: 1 per day     Alcohol Use 4/13/2022   Prescreen: >3 drinks/day or >7 drinks/week? No   Prescreen: >3 drinks/day or >7 drinks/week? -     Prostate cancer;  Management per urology,.  S/p XRT; gets twice yearly prostate specific antigen (PSA). S/p hormonal therapy.     Sees cardiology regularly.   Last in NOvember:      ASSESSMENT:   1.  Chest discomfort with known single-vessel occlusive coronary artery disease, namely ostial posterolateral branch with ipsilateral collateral filling by angiogram in 2016. Resolved  2.  Mild disease elsewhere.   3.  Normal LV systolic function.   4.  Mild carotid artery stenosis.   5.  Eye surgery    6.  Hypertension.   7.  Hyperlipidemia.   8.  Prostate Ca S/P radiotherapy     RECOMMENDATIONS:   1.  Atherosclerotic coronary artery disease, stable.  He is on appropriate guideline-directed medical therapy.  L  No return of symptoms.   2.  Peripheral arterial disease. Stable carotid ultrasound   3.  Hypertension: Given his weight loss we will decrease his Toprol to 25 mg daily along with decreasing his isosorbide mononitrate to 30 mg daily.  4.  Hyperlipidemia.  Let us continue with statin therapy.  He is due for cholesterol check this year.  I will leave this in your capable hands.   5.  We will have him return to clinic in 1 year's time with preclinic echocardiogram         Current providers sharing in care for this patient include:   Patient Care Team:  Michael Oliver MD as PCP - General (Internal Medicine)  Michael Oliver MD as Assigned PCP  William Terrell MD as MD (Urology)  Anand Maxwell MD as MD (Internal Medicine - Sleep Medicine)  Charlotte Kay MD  as Assigned Heart and Vascular Provider  Anand Maxwell MD as Assigned Sleep Provider    The following health maintenance items are reviewed in Epic and correct as of today:  Health Maintenance Due   Topic Date Due     ANNUAL REVIEW OF HM ORDERS  12/15/2021     Lab work is in process  Labs reviewed in EPIC  BP Readings from Last 3 Encounters:   04/14/22 102/60   11/03/21 100/65   12/15/20 102/64    Wt Readings from Last 3 Encounters:   04/14/22 104.1 kg (229 lb 8 oz)   11/03/21 101.6 kg (224 lb)   12/15/20 113.4 kg (250 lb)                  Patient Active Problem List   Diagnosis     Ulcerative colitis (H)     Hyperlipidemia with target LDL less than 100     DARYL (obstructive sleep apnea)     Kidney stones     Seasonal allergic rhinitis     Vitamin D deficiency     Carotid artery stenosis     Insomnia, unspecified type     Essential hypertension     Coronary artery disease involving native coronary artery of native heart without angina pectoris     Elevated prostate specific antigen (PSA)     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), wet (H)     Prostate cancer (H)     PAD (peripheral artery disease) (H)     Past Surgical History:   Procedure Laterality Date     ABDOMEN SURGERY       APPENDECTOMY       APPENDECTOMY       BIOPSY  1980 on    colonoscopies     CARDIAC SURGERY  May 2011    Cardiac Cath     COLONOSCOPY  1980 on     CORONARY ANGIOGRAPHY ADULT ORDER  2011, 2016    med tx, small vessels     CYSTOSCOPY       EYE SURGERY  03/2017    retinal detachment      EYE SURGERY      lasix     HEAD & NECK SURGERY  Aug 11th & 19th, 2016    retina eye surgeries     NOSE SURGERY       SURGICAL HISTORY OF -       tonsils     SURGICAL HISTORY OF -       cystoscopy for kidney stones     SURGICAL HISTORY OF -       nasal surgery     TONSILLECTOMY         Social History     Tobacco Use     Smoking status: Never Smoker     Smokeless tobacco: Never Used   Substance Use Topics     Alcohol use: Yes     Types:  7 Standard drinks or equivalent per week     Comment: 1 per day     Family History   Problem Relation Age of Onset     C.A.D. Mother      Hypertension Mother      Hyperlipidemia Mother      Thyroid Disease Mother      C.A.D. Maternal Grandfather      Coronary Artery Disease Maternal Grandfather      Hyperlipidemia Maternal Grandfather      Cancer Father         lung cancer, smoker.          Current Outpatient Medications   Medication Sig Dispense Refill     amLODIPine (NORVASC) 2.5 MG tablet Take 1 tablet (2.5 mg) by mouth daily 90 tablet 3     aspirin 81 MG tablet Take 81 mg by mouth 2 times daily       balsalazide (COLAZAL) 750 MG capsule Take 2 tablets twice daily       HYDROcodone-acetaminophen (NORCO) 5-325 MG tablet Take 1 tablet by mouth every 6 hours as needed for severe pain 10 tablet 0     isosorbide mononitrate (IMDUR) 30 MG 24 hr tablet Take 1 tablet (30 mg) by mouth daily 90 tablet 3     metoprolol succinate ER (TOPROL XL) 25 MG 24 hr tablet Take 1 tablet (25 mg) by mouth daily 90 tablet 3     ranolazine (RANEXA) 500 MG 12 hr tablet TAKE 1 TABLET EVERY 12 HOURS 180 tablet 3     rosuvastatin (CRESTOR) 20 MG tablet Take 1 tablet (20 mg) by mouth daily 90 tablet 3     tamsulosin (FLOMAX) 0.4 MG capsule Take 1 capsule (0.4 mg) by mouth daily 90 capsule 3     timolol hemihydrate (BETIMOL) 0.5 % ophthalmic solution Place 1 drop into both eyes 2 times daily       medical cannabis liquid (This is NOT a prescription, and does not certify that the patient has a qualifying medical condition for medical cannabis.  The purpose of this order is  to document that the patient reports taking medical cannabis.)       nitroglycerin (NITROLINGUAL) 0.4 MG/SPRAY spray For chest pain spray 1 spray under tongue every 5 minutes for 3 doses. If symptoms persist 5 minutes after 1st dose call 911. 4.9 g 0     ORDER FOR DME Equipment being ordered: CPAP and supplies 1 each 0     polyethylene glycol (MIRALAX) powder Take 17 g by  mouth daily 510 g 1     psyllium (METAMUCIL SMOOTH TEXTURE) 63 % POWD Take 2 teaspoonful by mouth daily Mix in 8 ounces of water 1 Bottle 11     Allergies   Allergen Reactions     Sulfa Drugs Hives         Review of Systems   Constitutional: Negative for chills and fever.   HENT: Positive for congestion. Negative for ear pain, hearing loss and sore throat.    Eyes: Negative for pain and visual disturbance.   Respiratory: Negative for cough and shortness of breath.    Cardiovascular: Negative for chest pain, palpitations and peripheral edema.   Gastrointestinal: Negative for abdominal pain, constipation, diarrhea, heartburn, hematochezia and nausea.   Genitourinary: Positive for impotence. Negative for dysuria, frequency, genital sores, hematuria, penile discharge and urgency.   Musculoskeletal: Negative for arthralgias, joint swelling and myalgias.   Skin: Negative for rash.   Neurological: Negative for dizziness, weakness, headaches and paresthesias.   Psychiatric/Behavioral: Negative for mood changes. The patient is not nervous/anxious.      CONSTITUTIONAL: NEGATIVE for fever, chills, change in weight  INTEGUMENTARY/SKIN: NEGATIVE for worrisome rashes, moles or lesions  EYES: NEGATIVE for vision changes or irritation  ENT/MOUTH: NEGATIVE for ear, mouth and throat problems  RESP: NEGATIVE for significant cough or SOB  BREAST: NEGATIVE for masses, tenderness or discharge  CV: NEGATIVE for chest pain, palpitations or peripheral edema  GI: NEGATIVE for nausea, abdominal pain, heartburn, or change in bowel habits  : NEGATIVE for frequency, dysuria, or hematuria  MUSCULOSKELETAL: NEGATIVE for significant arthralgias or myalgia  NEURO: NEGATIVE for weakness, dizziness or paresthesias  ENDOCRINE: NEGATIVE for temperature intolerance, skin/hair changes  HEME: NEGATIVE for bleeding problems  PSYCHIATRIC: NEGATIVE for changes in mood or affect    OBJECTIVE:   /60 (BP Location: Right arm, Patient Position: Sitting,  "Cuff Size: Adult Large)   Pulse 64   Temp 98.3  F (36.8  C) (Oral)   Resp 20   Ht 1.88 m (6' 2\")   Wt 104.1 kg (229 lb 8 oz)   SpO2 94%   BMI 29.47 kg/m   Estimated body mass index is 29.47 kg/m  as calculated from the following:    Height as of this encounter: 1.88 m (6' 2\").    Weight as of this encounter: 104.1 kg (229 lb 8 oz).  Physical Exam  GENERAL: healthy, alert and no distress  EYES: Eyes grossly normal to inspection, PERRL and conjunctivae and sclerae normal  HENT: ear canals and TM's normal, nose and mouth without ulcers or lesions  NECK: no adenopathy, no asymmetry, masses, or scars and thyroid normal to palpation  RESP: lungs clear to auscultation - no rales, rhonchi or wheezes  CV: regular rate and rhythm, normal S1 S2, no S3 or S4, no murmur, click or rub, no peripheral edema and peripheral pulses strong  ABDOMEN: soft, nontender, no hepatosplenomegaly, no masses and bowel sounds normal   (male): normal male genitalia without lesions or urethral discharge, no hernia  MS: no gross musculoskeletal defects noted, no edema  SKIN: no suspicious lesions or rashes  NEURO: Normal strength and tone, mentation intact and speech normal  PSYCH: mentation appears normal, affect normal/bright    Diagnostic Test Results:  Labs reviewed in Epic    ASSESSMENT / PLAN:   (Z00.00) Encounter for Medicare annual wellness exam  (primary encounter diagnosis)  Comment: Discussed diet, exercise, testicular self exam, blood pressure, cholesterol, and need for cancer surveillance at appropriate ages.   Plan:     (H35.3290) Exudative age-related macular degeneration, unspecified laterality, unspecified stage (H)  Comment:   Plan: management per ophthalmology.     (K51.919) Ulcerative colitis with complication, unspecified location (H)  Comment:   Plan: stable.  Continue treatment per gastroenterology.     (I73.9) PAD (peripheral artery disease) (H)  Comment:   Plan: secondary risk factor modification.     (C61) Prostate " "cancer (H)  Comment:   Plan: PSA, tumor marker        Management per urolrogy.  Will now be getting  prostate specific antigen (PSA) standing orders here for twice yearly checks.     (E78.5) Hyperlipidemia with target LDL less than 100  Comment:   Plan:     (I10) Essential hypertension  Comment:   Plan: at goal.     (I25.10) Coronary artery disease involving native coronary artery of native heart without angina pectoris  Comment:   Plan: Comprehensive metabolic panel (BMP + Alb, Alk         Phos, ALT, AST, Total. Bili, TP), Lipid panel         reflex to direct LDL Fasting, amLODIPine         (NORVASC) 2.5 MG tablet, rosuvastatin (CRESTOR)        20 MG tablet        secondary risk factor modification.     (I65.29) Stenosis of carotid artery, unspecified laterality  Comment:   Plan: carotids this fall; cardiology has been ordering and following.     (N40.1) Benign prostatic hyperplasia with lower urinary tract symptoms, symptom details unspecified  Comment:   Plan: tamsulosin (FLOMAX) 0.4 MG capsule        Refilled; symptoms in remission s/o xrt.     (N20.0) Kidney stones  Comment:   Plan: HYDROcodone-acetaminophen (NORCO) 5-325 MG         tablet        Currently not active, but patient would like supply in case has a flare.       Patient has been advised of split billing requirements and indicates understanding: Yes    COUNSELING:  Reviewed preventive health counseling, as reflected in patient instructions       Regular exercise       Healthy diet/nutrition       Vision screening       Hearing screening       Colon cancer screening       Prostate cancer screening    Estimated body mass index is 29.47 kg/m  as calculated from the following:    Height as of this encounter: 1.88 m (6' 2\").    Weight as of this encounter: 104.1 kg (229 lb 8 oz).    Weight management plan: Patient was referred to their PCP to discuss a diet and exercise plan.    He reports that he has never smoked. He has never used smokeless " tobacco.      Appropriate preventive services were discussed with this patient, including applicable screening as appropriate for cardiovascular disease, diabetes, osteopenia/osteoporosis, and glaucoma.  As appropriate for age/gender, discussed screening for colorectal cancer, prostate cancer, breast cancer, and cervical cancer. Checklist reviewing preventive services available has been given to the patient.    Reviewed patients plan of care and provided an AVS. The Basic Care Plan (routine screening as documented in Health Maintenance) for Hood meets the Care Plan requirement. This Care Plan has been established and reviewed with the Patient.    Counseling Resources:  ATP IV Guidelines  Pooled Cohorts Equation Calculator  Breast Cancer Risk Calculator  Breast Cancer: Medication to Reduce Risk  FRAX Risk Assessment  ICSI Preventive Guidelines  Dietary Guidelines for Americans, 2010  [employer]'s MyPlate  ASA Prophylaxis  Lung CA Screening    Michael Oliver MD  [facility]    Identified Health Risks:

## 2022-04-14 NOTE — PATIENT INSTRUCTIONS
"Set up a September lab for prostate specific antigen (PSA)>    Follow-up with cardiology in the fall and get another carotid ultrasound.    Colonoscopy in 10/2022.    Lab work today:  We can do labs in the exam room today.    Refills all today.    Up to date on all shots.    Cardio exercise is probably the most helpful thing for your heart and future health.  Try to get about 30 minutes of sustained cardio exercise (biking, running, swimming, fast walking) every other day or even every day.  Keeping your heart rate at a \"target\" of (220 - your age) x 0.80 will be your goal.  For example, if you're 60 years old, this would be (220 - 60) x 0.80 = 128 beats per minute for those 30 minutes of exercise.     Try to limit complex carbs (rice, bread, pasta, potatoes) and simple carbs (pop, juice, alcohol.)  Eating more lean proteins (chicken, fish, eggs, beans, nuts) and unlimited vegetables and fruits can definitely help as well.      Patient Education   Personalized Prevention Plan  You are due for the preventive services outlined below.  Your care team is available to assist you in scheduling these services.  If you have already completed any of these items, please share that information with your care team to update in your medical record.  Health Maintenance Due   Topic Date Due    ANNUAL REVIEW OF HM ORDERS  12/15/2021        "

## 2022-04-15 LAB
ALBUMIN SERPL-MCNC: 4.1 G/DL (ref 3.4–5)
ALP SERPL-CCNC: 71 U/L (ref 40–150)
ALT SERPL W P-5'-P-CCNC: 29 U/L (ref 0–70)
ANION GAP SERPL CALCULATED.3IONS-SCNC: 7 MMOL/L (ref 3–14)
AST SERPL W P-5'-P-CCNC: 23 U/L (ref 0–45)
BILIRUB SERPL-MCNC: 0.6 MG/DL (ref 0.2–1.3)
BUN SERPL-MCNC: 18 MG/DL (ref 7–30)
CALCIUM SERPL-MCNC: 9.7 MG/DL (ref 8.5–10.1)
CHLORIDE BLD-SCNC: 106 MMOL/L (ref 94–109)
CHOLEST SERPL-MCNC: 156 MG/DL
CO2 SERPL-SCNC: 26 MMOL/L (ref 20–32)
CREAT SERPL-MCNC: 0.78 MG/DL (ref 0.66–1.25)
FASTING STATUS PATIENT QL REPORTED: NO
GFR SERPL CREATININE-BSD FRML MDRD: >90 ML/MIN/1.73M2
GLUCOSE BLD-MCNC: 112 MG/DL (ref 70–99)
HDLC SERPL-MCNC: 44 MG/DL
LDLC SERPL CALC-MCNC: 62 MG/DL
NONHDLC SERPL-MCNC: 112 MG/DL
POTASSIUM BLD-SCNC: 4.3 MMOL/L (ref 3.4–5.3)
PROT SERPL-MCNC: 6.9 G/DL (ref 6.8–8.8)
SODIUM SERPL-SCNC: 139 MMOL/L (ref 133–144)
TRIGL SERPL-MCNC: 249 MG/DL

## 2022-05-26 DIAGNOSIS — I25.10 CORONARY ARTERY DISEASE INVOLVING NATIVE CORONARY ARTERY OF NATIVE HEART WITHOUT ANGINA PECTORIS: ICD-10-CM

## 2022-05-27 RX ORDER — ROSUVASTATIN CALCIUM 20 MG/1
TABLET, COATED ORAL
Qty: 90 TABLET | Refills: 3 | OUTPATIENT
Start: 2022-05-27

## 2022-05-27 RX ORDER — AMLODIPINE BESYLATE 2.5 MG/1
TABLET ORAL
Qty: 90 TABLET | Refills: 3 | OUTPATIENT
Start: 2022-05-27

## 2022-05-31 DIAGNOSIS — I25.10 CORONARY ARTERY DISEASE INVOLVING NATIVE CORONARY ARTERY OF NATIVE HEART WITHOUT ANGINA PECTORIS: ICD-10-CM

## 2022-06-02 RX ORDER — ROSUVASTATIN CALCIUM 20 MG/1
20 TABLET, COATED ORAL DAILY
Qty: 90 TABLET | Refills: 3 | OUTPATIENT
Start: 2022-06-02

## 2022-07-21 ENCOUNTER — MYC MEDICAL ADVICE (OUTPATIENT)
Dept: PEDIATRICS | Facility: CLINIC | Age: 77
End: 2022-07-21

## 2022-07-21 DIAGNOSIS — I25.10 CORONARY ARTERY DISEASE INVOLVING NATIVE CORONARY ARTERY OF NATIVE HEART WITHOUT ANGINA PECTORIS: ICD-10-CM

## 2022-07-21 RX ORDER — ROSUVASTATIN CALCIUM 20 MG/1
20 TABLET, COATED ORAL DAILY
Qty: 90 TABLET | Refills: 2 | Status: SHIPPED | OUTPATIENT
Start: 2022-07-21 | End: 2023-05-04

## 2022-07-25 ENCOUNTER — TRANSFERRED RECORDS (OUTPATIENT)
Dept: PEDIATRICS | Facility: CLINIC | Age: 77
End: 2022-07-25

## 2022-07-30 ENCOUNTER — HEALTH MAINTENANCE LETTER (OUTPATIENT)
Age: 77
End: 2022-07-30

## 2022-09-06 ENCOUNTER — LAB (OUTPATIENT)
Dept: LAB | Facility: CLINIC | Age: 77
End: 2022-09-06
Payer: COMMERCIAL

## 2022-09-06 DIAGNOSIS — C61 PROSTATE CANCER (H): ICD-10-CM

## 2022-09-06 PROCEDURE — 36415 COLL VENOUS BLD VENIPUNCTURE: CPT

## 2022-09-06 PROCEDURE — 84153 ASSAY OF PSA TOTAL: CPT

## 2022-09-07 LAB — PSA SERPL-MCNC: 0.29 UG/L (ref 0–4)

## 2022-09-15 DIAGNOSIS — I25.10 CORONARY ARTERY DISEASE INVOLVING NATIVE CORONARY ARTERY OF NATIVE HEART WITHOUT ANGINA PECTORIS: ICD-10-CM

## 2022-09-15 RX ORDER — RANOLAZINE 500 MG/1
TABLET, EXTENDED RELEASE ORAL
Qty: 180 TABLET | Refills: 3 | Status: SHIPPED | OUTPATIENT
Start: 2022-09-15 | End: 2023-08-01

## 2022-10-09 ENCOUNTER — HEALTH MAINTENANCE LETTER (OUTPATIENT)
Age: 77
End: 2022-10-09

## 2022-10-14 ENCOUNTER — MYC MEDICAL ADVICE (OUTPATIENT)
Dept: SLEEP MEDICINE | Facility: CLINIC | Age: 77
End: 2022-10-14

## 2022-10-31 ENCOUNTER — HOSPITAL ENCOUNTER (OUTPATIENT)
Dept: CARDIOLOGY | Facility: CLINIC | Age: 77
Discharge: HOME | End: 2022-10-31
Attending: INTERNAL MEDICINE | Admitting: INTERNAL MEDICINE
Payer: COMMERCIAL

## 2022-10-31 DIAGNOSIS — I25.10 CORONARY ARTERY DISEASE INVOLVING NATIVE CORONARY ARTERY OF NATIVE HEART WITHOUT ANGINA PECTORIS: ICD-10-CM

## 2022-10-31 LAB — LVEF ECHO: NORMAL

## 2022-10-31 PROCEDURE — 93306 TTE W/DOPPLER COMPLETE: CPT

## 2022-10-31 PROCEDURE — 93306 TTE W/DOPPLER COMPLETE: CPT | Mod: 26 | Performed by: INTERNAL MEDICINE

## 2022-11-02 ENCOUNTER — OFFICE VISIT (OUTPATIENT)
Dept: CARDIOLOGY | Facility: CLINIC | Age: 77
End: 2022-11-02
Attending: INTERNAL MEDICINE
Payer: COMMERCIAL

## 2022-11-02 VITALS
HEART RATE: 62 BPM | HEIGHT: 74 IN | DIASTOLIC BLOOD PRESSURE: 64 MMHG | BODY MASS INDEX: 29.71 KG/M2 | WEIGHT: 231.5 LBS | SYSTOLIC BLOOD PRESSURE: 96 MMHG

## 2022-11-02 DIAGNOSIS — R09.89 BILATERAL CAROTID BRUITS: Primary | ICD-10-CM

## 2022-11-02 DIAGNOSIS — I25.10 CORONARY ARTERY DISEASE INVOLVING NATIVE CORONARY ARTERY OF NATIVE HEART WITHOUT ANGINA PECTORIS: ICD-10-CM

## 2022-11-02 PROCEDURE — 99214 OFFICE O/P EST MOD 30 MIN: CPT | Performed by: INTERNAL MEDICINE

## 2022-11-02 NOTE — LETTER
11/2/2022    Michael Oliver MD  [address] [place] [address]    RE: Hood Arizmendi       Dear Colleague,     I had the pleasure of seeing Hood Arizmendi in the [facility].        Cardiology    I had the pleasure to follow up with your patient, Mr. Hood Arizmendi, in the Cardiovascular Medicine Clinic.  As you recall, he is a very pleasant 77-year-old gentleman who I got acquainted with when he moved to the [place] area.  He has known coronary artery disease, namely small vessel disease.  The posterolateral branch is 100% occluded with moderate disease elsewhere.  His coronary angiogram was performed in 2016, which confirms these findings.         From a cardiac perspective, this gentleman is doing well.  He has not noticed any chest pain, PND, orthopnea, syncope or any other cardiac related concerns.    He has intentionally lost over 20 pounds over the past year.  Here for his routine follow-up visit.    Echo 2022    Left ventricular systolic function is normal.  No gross wall motion abnormalities noted, but overall assessment for this is  challenging given lack of contrast.  The right ventricle is normal in size and function.  No hemodynamically significant valvular disease.  The inferior vena cava was normal in size with preserved respiratory  variability.        Echo 2020  1. Normal left ventricular size and systolic function. LVEF 55-60%.  2. No regional wall motion abnormalities.  3. Normal right ventricular size and systolic function.  4. No significant valve disease.      Carotid US: 2020    Right side:      Plaque Morphology: Echogenic, smooth         Proximal CCA: 124/28 cm/sec     Mid-distal CCA: 91/22 cm/sec     External CA: 117/16 cm/sec     Proximal ICA: 76/14 cm/sec     Mid ICA: 57/22 cm/sec     Distal ICA: 59/17 cm/sec     Vertebral artery: 36/14 cm/sec, antegrade     ICA/CCA ratio: 0.8     Left side:      Plaque Morphology: Echogenic, smooth        Proximal CCA:  95/25 cm/sec     Mid-distal CCA: 93/29 cm/sec     External CA: 75/17 cm/sec     Proximal ICA: 90/23 cm/sec     Mid ICA: 56/22 cm/sec     Distal ICA: 60/24 cm/sec     Vertebral artery: 34/8 cm/sec, antegrade     ICA/CCA ratio: 1.0                                                                      Impression:     1. Degree of stenosis of the internal carotid artery: Mild echogenic  smooth plaque. By velocity criteria there is <50% diameter stenosis of  the internal carotid artery. Similar to prior study dated 4/9/2018.     2. Left side: Mild echogenic smooth plaque. By velocity criteria there  is <50% diameter stenosis of the internal carotid artery. Similar to  prior study dated 4/9/2018.        PHYSICAL EXAMINATION:   VITAL SIGNS:  There were no vitals taken for this visit.    GENERAL: Healthy, alert and no distress  EYES: Eyes grossly normal to inspection.  No discharge or erythema, or obvious scleral/conjunctival abnormalities.  RESP: No audible wheeze, cough, or visible cyanosis.  No visible retractions or increased work of breathing.    SKIN: Visible skin clear. No significant rash, abnormal pigmentation or lesions.  NEURO: Cranial nerves grossly intact.  Mentation and speech appropriate for age.  PSYCH: Mentation appears normal, affect normal/bright, judgement and insight intact, normal speech and appearance well-groomed.         ASSESSMENT:   1.  Chest discomfort with known single-vessel occlusive coronary artery disease, namely ostial posterolateral branch with ipsilateral collateral filling by angiogram in 2016. Resolved  2.  Mild disease elsewhere.   3.  Normal LV systolic function.   4.  Mild carotid artery stenosis.   5.  Eye surgery    6.  Hypertension.   7.  Hyperlipidemia.   8.  Prostate Ca S/P radiotherapy     RECOMMENDATIONS:   1.  Atherosclerotic coronary artery disease, stable.  He is on appropriate guideline-directed medical therapy.   No return of symptoms.  Echocardiogram reviewed and was  stable  2.  Peripheral arterial disease.  Risk factors are modified we will get a carotid ultrasound next year  3.  Hypertension: Stable  4.  Hyperlipidemia.  Let us continue with statin therapy.  LDL is at goal from this years check.  Continue with statin.    5.  We will have him return to clinic in 1 year's time with preclinic carotid ultrasound    Thank you for allowing me to participate in the care of your patient.      Sincerely,     Charlotte Kay MD     [facility]  cc:   Charlotte Kay MD  [address] [place] [id number]

## 2022-11-02 NOTE — PROGRESS NOTES
Cardiology    I had the pleasure to follow up with your patient, Mr. Hood Arizmendi, in the Cardiovascular Medicine Clinic.  As you recall, he is a very pleasant 77-year-old gentleman who I got acquainted with when he moved to the [place] area.  He has known coronary artery disease, namely small vessel disease.  The posterolateral branch is 100% occluded with moderate disease elsewhere.  His coronary angiogram was performed in 2016, which confirms these findings.         From a cardiac perspective, this gentleman is doing well.  He has not noticed any chest pain, PND, orthopnea, syncope or any other cardiac related concerns.    He has intentionally lost over 20 pounds over the past year.  Here for his routine follow-up visit.    Echo 2022    Left ventricular systolic function is normal.  No gross wall motion abnormalities noted, but overall assessment for this is  challenging given lack of contrast.  The right ventricle is normal in size and function.  No hemodynamically significant valvular disease.  The inferior vena cava was normal in size with preserved respiratory  variability.        Echo 2020  1. Normal left ventricular size and systolic function. LVEF 55-60%.  2. No regional wall motion abnormalities.  3. Normal right ventricular size and systolic function.  4. No significant valve disease.      Carotid US: 2020    Right side:      Plaque Morphology: Echogenic, smooth         Proximal CCA: 124/28 cm/sec     Mid-distal CCA: 91/22 cm/sec     External CA: 117/16 cm/sec     Proximal ICA: 76/14 cm/sec     Mid ICA: 57/22 cm/sec     Distal ICA: 59/17 cm/sec     Vertebral artery: 36/14 cm/sec, antegrade     ICA/CCA ratio: 0.8     Left side:      Plaque Morphology: Echogenic, smooth        Proximal CCA: 95/25 cm/sec     Mid-distal CCA: 93/29 cm/sec     External CA: 75/17 cm/sec     Proximal ICA: 90/23 cm/sec     Mid ICA: 56/22 cm/sec     Distal ICA: 60/24 cm/sec     Vertebral artery: 34/8 cm/sec,  antegrade     ICA/CCA ratio: 1.0                                                                      Impression:     1. Degree of stenosis of the internal carotid artery: Mild echogenic  smooth plaque. By velocity criteria there is <50% diameter stenosis of  the internal carotid artery. Similar to prior study dated 4/9/2018.     2. Left side: Mild echogenic smooth plaque. By velocity criteria there  is <50% diameter stenosis of the internal carotid artery. Similar to  prior study dated 4/9/2018.        PHYSICAL EXAMINATION:   VITAL SIGNS:  There were no vitals taken for this visit.    GENERAL: Healthy, alert and no distress  EYES: Eyes grossly normal to inspection.  No discharge or erythema, or obvious scleral/conjunctival abnormalities.  RESP: No audible wheeze, cough, or visible cyanosis.  No visible retractions or increased work of breathing.    SKIN: Visible skin clear. No significant rash, abnormal pigmentation or lesions.  NEURO: Cranial nerves grossly intact.  Mentation and speech appropriate for age.  PSYCH: Mentation appears normal, affect normal/bright, judgement and insight intact, normal speech and appearance well-groomed.         ASSESSMENT:   1.  Chest discomfort with known single-vessel occlusive coronary artery disease, namely ostial posterolateral branch with ipsilateral collateral filling by angiogram in 2016. Resolved  2.  Mild disease elsewhere.   3.  Normal LV systolic function.   4.  Mild carotid artery stenosis.   5.  Eye surgery    6.  Hypertension.   7.  Hyperlipidemia.   8.  Prostate Ca S/P radiotherapy     RECOMMENDATIONS:   1.  Atherosclerotic coronary artery disease, stable.  He is on appropriate guideline-directed medical therapy.   No return of symptoms.  Echocardiogram reviewed and was stable  2.  Peripheral arterial disease.  Risk factors are modified we will get a carotid ultrasound next year  3.  Hypertension: Stable  4.  Hyperlipidemia.  Let us continue with statin therapy.  LDL  is at goal from this years check.  Continue with statin.    5.  We will have him return to clinic in 1 year's time with preclinic carotid ultrasound      Charlotte Kay MD

## 2022-11-10 ENCOUNTER — TRANSFERRED RECORDS (OUTPATIENT)
Dept: HEALTH INFORMATION MANAGEMENT | Facility: CLINIC | Age: 77
End: 2022-11-10

## 2022-11-18 ENCOUNTER — MYC MEDICAL ADVICE (OUTPATIENT)
Dept: SLEEP MEDICINE | Facility: CLINIC | Age: 77
End: 2022-11-18

## 2022-11-18 DIAGNOSIS — G47.33 OSA ON CPAP: Primary | ICD-10-CM

## 2022-11-28 ENCOUNTER — DOCUMENTATION ONLY (OUTPATIENT)
Dept: SLEEP MEDICINE | Facility: CLINIC | Age: 77
End: 2022-11-28

## 2022-11-28 DIAGNOSIS — G47.33 OBSTRUCTIVE SLEEP APNEA (ADULT) (PEDIATRIC): Primary | ICD-10-CM

## 2022-11-29 DIAGNOSIS — I25.10 CORONARY ARTERY DISEASE INVOLVING NATIVE CORONARY ARTERY OF NATIVE HEART WITHOUT ANGINA PECTORIS: ICD-10-CM

## 2022-11-29 RX ORDER — METOPROLOL SUCCINATE 25 MG/1
TABLET, EXTENDED RELEASE ORAL
Qty: 90 TABLET | Refills: 3 | OUTPATIENT
Start: 2022-11-29

## 2022-11-29 RX ORDER — ISOSORBIDE MONONITRATE 30 MG/1
30 TABLET, EXTENDED RELEASE ORAL DAILY
Qty: 90 TABLET | Refills: 3 | Status: SHIPPED | OUTPATIENT
Start: 2022-11-29 | End: 2023-11-03

## 2022-11-29 RX ORDER — METOPROLOL SUCCINATE 25 MG/1
25 TABLET, EXTENDED RELEASE ORAL DAILY
Qty: 90 TABLET | Refills: 3 | Status: SHIPPED | OUTPATIENT
Start: 2022-11-29 | End: 2023-11-03

## 2022-11-29 RX ORDER — ISOSORBIDE MONONITRATE 30 MG/1
TABLET, EXTENDED RELEASE ORAL
Qty: 90 TABLET | Refills: 3 | OUTPATIENT
Start: 2022-11-29

## 2022-11-30 ENCOUNTER — MYC MEDICAL ADVICE (OUTPATIENT)
Dept: SLEEP MEDICINE | Facility: CLINIC | Age: 77
End: 2022-11-30

## 2022-11-30 NOTE — TELEPHONE ENCOUNTER
Is there anyway we can get this patient seen before his 12/15/22 DME appointment.  Needs updated progress notes from provider.

## 2022-12-01 ENCOUNTER — OFFICE VISIT (OUTPATIENT)
Dept: SLEEP MEDICINE | Facility: CLINIC | Age: 77
End: 2022-12-01
Payer: COMMERCIAL

## 2022-12-01 VITALS
SYSTOLIC BLOOD PRESSURE: 105 MMHG | HEART RATE: 65 BPM | BODY MASS INDEX: 29.26 KG/M2 | WEIGHT: 228 LBS | HEIGHT: 74 IN | DIASTOLIC BLOOD PRESSURE: 66 MMHG | OXYGEN SATURATION: 95 %

## 2022-12-01 DIAGNOSIS — F51.04 CHRONIC INSOMNIA: ICD-10-CM

## 2022-12-01 DIAGNOSIS — G47.33 OSA (OBSTRUCTIVE SLEEP APNEA): Primary | ICD-10-CM

## 2022-12-01 PROCEDURE — 99214 OFFICE O/P EST MOD 30 MIN: CPT | Performed by: PHYSICIAN ASSISTANT

## 2022-12-01 ASSESSMENT — SLEEP AND FATIGUE QUESTIONNAIRES
HOW LIKELY ARE YOU TO NOD OFF OR FALL ASLEEP WHILE SITTING AND TALKING TO SOMEONE: WOULD NEVER DOZE
HOW LIKELY ARE YOU TO NOD OFF OR FALL ASLEEP WHILE WATCHING TV: WOULD NEVER DOZE
HOW LIKELY ARE YOU TO NOD OFF OR FALL ASLEEP WHILE SITTING INACTIVE IN A PUBLIC PLACE: WOULD NEVER DOZE
HOW LIKELY ARE YOU TO NOD OFF OR FALL ASLEEP IN A CAR, WHILE STOPPED FOR A FEW MINUTES IN TRAFFIC: WOULD NEVER DOZE
HOW LIKELY ARE YOU TO NOD OFF OR FALL ASLEEP WHILE LYING DOWN TO REST IN THE AFTERNOON WHEN CIRCUMSTANCES PERMIT: SLIGHT CHANCE OF DOZING
HOW LIKELY ARE YOU TO NOD OFF OR FALL ASLEEP WHILE SITTING AND READING: WOULD NEVER DOZE
HOW LIKELY ARE YOU TO NOD OFF OR FALL ASLEEP WHILE SITTING QUIETLY AFTER LUNCH WITHOUT ALCOHOL: WOULD NEVER DOZE
HOW LIKELY ARE YOU TO NOD OFF OR FALL ASLEEP WHEN YOU ARE A PASSENGER IN A CAR FOR AN HOUR WITHOUT A BREAK: WOULD NEVER DOZE

## 2022-12-01 NOTE — NURSING NOTE
"Chief Complaint   Patient presents with     RECHECK     Needs current notes for cpap from [facility].  Hasn't been seen in a year       Initial /66   Pulse 65   Ht 1.88 m (6' 2.02\")   Wt 103.4 kg (228 lb)   SpO2 95%   BMI 29.26 kg/m   Estimated body mass index is 29.26 kg/m  as calculated from the following:    Height as of this encounter: 1.88 m (6' 2.02\").    Weight as of this encounter: 103.4 kg (228 lb).    Medication Reconciliation: complete        DME:yes debliss cpap,     ESS 1  DILLON 13      "

## 2022-12-01 NOTE — PROGRESS NOTES
CPAP Follow-Up Visit:    Date on this visit: 12/1/2022    Hood Arizmendi has a follow-up visit today to review his CPAP use for DARYL. He was initially seen to obtain  replacement CPAP equipment.   PMH DARYL, coronary artery disease, hypertension, hyperlipidemia, carotid artery stenosis.      He reports that he had a previous sleep study through a sleep center in the [place], [place] between 2001/2002 and the reports are unavailable    Previous WatchPATStudy Results:   Date: 11/16/21.  Weight 224 pounds.  AHI: 7/hr.  O2 rafi 85%.    He initially was evaluated due to being tired and never dreaming. He has been using a DeVilbiss CPAP he bought online about 14 years ago. He got a Respironics CPAP in [place] about 10 years ago. That was recalled, so he has been using the DeVilbiss again.  A replacement machine was ordered last year but due to the shortage of CPAPs related to the recall, he was unable to get a machine.  [facility] has a machine available for him now but needs this visit.   He feels the machine is still working. We do not have download data but the machine shows he has over 33127 hours of use on that machine. He has used a CPAP nightly for over 20 years.    PAP machine: DeVilbiss. Pressure settings: 8-15 cm. DME is [facility]       Interface:  Mask: nasal mask. Gets a new mask cushion monthly, everything else about 6 months. Washes weekly with Ivory soap. Changes filters appropriately.  Chin strap: No  Leak: 1-2 times a year his wife will comment  Using Humidifier: Yes  Condensation in hose or mask: No     Difficulties with therapy:    [-] Snoring with CPAP:  [-] Difficulty tolerating the pressure:  [-] Epistaxis/dry nose:   [-] Nasal congestion:  [+] Dry mouth: can be extreme. He thinks medications may play a part.  [-] Mouth breathing:   [-] Pain/skin breakdown:      Improvements noted with CPAP:   [+] waking up more refreshed  [+] sleeping better with less arousals  [+] nocturia improved   [+] improved energy level  during the day    Weight change since sleep study: 228 lbs, down a few pounds in a year    Bedtime: 10:30 PM, watches TV and reads until midnight.  Wake time: 6-6:30 AM for the restroom and then can't get back to sleep. He then feels tired through the day. This has been going on 4-5 months. He is not sure what changed. Prior to that, he would be able to get back to sleep from 6 or 6:30 to 8 AM. Falls asleep in 3-4 minutes. Wakes 1 times per night and can't get back to sleep. Reason for waking: restroom  On weekends, his schedule is the same  Naps: 0 times. He can't sleep without the CPAP and still can only fall asleep about 10% of the time if he uses CPAP. No inadvertent dozing.       He is retired as of 2002. He was a . He moved here 8 years ago. He denies having a racing mind. He reads and watches TV and has a workshop to keep himself busy now. He does weight lifting 2-3 times per week and treadmill 2-3 times per week. They have blacked out their bedroom so he does not get light exposure when he wakes at 6AM. He uses ear plugs and a fan for white noise. He has rare alcohol and only 1 can of soda a day at noon.      Past medical/surgical history, family history, social history, medications and allergies were reviewed.      Problem List:  Patient Active Problem List    Diagnosis Date Noted     PAD (peripheral artery disease) (H) 12/15/2020     Priority: Medium     Prostate cancer (H) 08/04/2018     Priority: Medium     Management per urology.       Elevated prostate specific antigen (PSA) 06/30/2018     Priority: Medium     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), wet (H) 05/01/2018     Priority: Medium     Insomnia, unspecified type 07/25/2016     Priority: Medium     Essential hypertension 07/25/2016     Priority: Medium     Coronary artery disease involving native coronary artery of native heart without angina pectoris 07/25/2016     Priority: Medium     Carotid artery stenosis      Priority: Medium      Ulcerative colitis (H) 08/28/2014     Priority: Medium     Followed by [place] [facility]; every 2 years colonoscopy       Hyperlipidemia with target LDL less than 100 08/28/2014     Priority: Medium     Diagnosis updated by automated process. Provider to review and confirm.       DARYL (obstructive sleep apnea) 08/28/2014     Priority: Medium     On CPAP 6/12, and temazepam alternating with zolpidem       Kidney stones 08/28/2014     Priority: Medium     Seasonal allergic rhinitis 08/28/2014     Priority: Medium     Vitamin D deficiency 08/28/2014     Priority: Medium     Problem list name updated by automated process. Provider to review          Impression/Plan:    (G47.33) DARYL (obstructive sleep apnea)  (primary encounter diagnosis)  Comment: Harrison presents today primarily to satisfy insurance requirements to get a replacement CPAP machine. He was initially diagnosed with DARYL about 20 years ago and has been faithfully using CPAP since then. He benefits from use, can't sleep without CPAP. His current machine is about 14 years old, has over 25254 hours of use and is a brand we do not support (so we are unable to get a download).  He does get dry mouth but is not aware of mouth leak. He would benefit from a replacement machine which would allow us to verify efficacy of the device.  Plan: Comprehensive DME        Order placed for a replacement auto CPAP 8-15 cm      (F51.04) Chronic insomnia  Comment:  He goes to sleep at midnight (in bed at 10:30 PM to watch TV and read before sleep), wakes at 6-6:30 AM for the restroom and has been unable to get back to sleep after that in the last 4-5 months. He used to get back to sleep and then be up for the day at 8 AM. He is more tired in the day but denies napping. He denies racing mind. He is not aware of any trigger for his insomnia.   Plan: We discussed stimulus control. He was advised to do most of his wind-down routine out of bed and only go to bed once feeling sleepy. He  was also advised to get out of bed in the night if he wakes and has difficulty getting back to sleep.  We discussed options of compressing his sleep 1 of 2 ways. He could keep his wake time at 6 or 6:30 AM and gradually try to fall asleep about 15 minutes earlier every couple of weeks (starting at about his current bedtime of midnight) to expand his sleep intake and hopefully maintain the same wake time. Alternatively, he could keep 8 AM as his wake time to an alarm and push his bedtime 15 minutes later every 2 weeks to hopefully allow him to get back to sleep after he wakes for the restroom. He opted to try the former strategy of keeping a wake time of 6 or 6:30 AM.       He will follow up with me in about 2 month(s) after getting a replacement CPAP.     35 minutes were spent on the date of the encounter doing chart review, history and exam, documentation and further activities as noted above.     Bennett Goltz, PA-C    CC: No ref. provider found

## 2022-12-01 NOTE — TELEPHONE ENCOUNTER
Patient has been scheduled to be seen at the [facility] clinic on 12/1/2022.

## 2022-12-01 NOTE — PATIENT INSTRUCTIONS
General recommendations for sleep problems (Insomnia)  Allow 2-4 weeks to see results    You may try keeping your wake time at 6-6:30 AM and increasing your sleep intake by trying to go to bed 15 minutes earlier every couple of weeks to see if you can extend your sleep duration.  An alternative strategy would be to keep your alarm at 8 AM and push your bedtime 15 minutes later every 2 weeks until you are able to get back to sleep after the 6 AM awakening. Once that is the pattern, you can try moving your bedtime 15 minutes earlier every 15 minutes again. Don't reduce your time in bed below 7 hours.     Establish a regular sleep schedule    Most people only need 7-8 hours of sleep.  Don't be in bed longer than you need     to sleep or you will end up spending more time awake in bed. This trains your    brain to think of the bed as a place to not sleep.    Go to bed at same time each night   Get up at same time each day - Set an alarm everyday (even weekends). This is one of    the most important tips. It prevents you from relying on your insomnia to get you    up on time for your day. That actually reinforces insomnia. It also will help your    body get into a pattern where you start feeling tired at a consistent time each    night.  The body functions best when you keep a consistent routine.  Avoid sleeping-in and napping. Anytime you sleep during the day, you will be less tired at    night. You may be tired enough to fall asleep, but you will wake more in the    middle of the night because you will have met your sleep need before the night is    done.   Cut down time in bed (if not asleep, get up)- Use your bed only for sleep and sex    Anytime you spend time in bed doing activities other than sleep (reading,    watching TV, working, playing on the computer or phone, or even just laying in    bed trying to sleep), you are training  your brain to think of the bed as a place to    do activities other than sleep. If  you are not falling asleep within 20-30 minutes,    get out of bed. While out of bed, avoid bright lights. Avoid work or chores. Being    productive in the middle of the night reinforces waking up at night. Find relaxing,    not particularly entertaining activities like reading, listening to music, or relaxation    exercises. Go back to bed if you start feeling groggy, or after about 30 minutes,    even if not feeling very tired. Sometimes, just getting out of bed stops the pattern    of getting frustrated about laying in bed not sleeping, and that can help you fall    asleep.   Avoid trying to force yourself to sleep- sleep is not like everything else. The harder you    work at most things, the more you can accomplish. The harder you work at    sleep, the less you will sleep.     Make the bedroom comfortable - quiet, dark and cool are better. Consider ear plugs    (silicon). Use dark blinds or wear an eye mask if needed     Make a relaxing routine prior to bedtime  Relaxation exercises:   Progressive muscle relaxation: Relax each muscle group individually    Begin with your feet, flex, then relax. Try to imagine your feet feeling heavy and sinking into the bed. Move to your calves, do the same thing. Work through each muscle group toward your head.    Relaxing Mental Imagery: Try to imagine a trip that you took and found relaxing, or imagine a day at the beach. Try to walk yourself through the day in your mind as if you were dreaming it. Try to imagine sensing the different experiences, such as feeling sand between your toes, the heat of the sun on your skin, seeing the waves crashing the shore, the smell of the salt water, etc.     Deal with your worries before bedtime    Set aside a worry time around dinner time for 10-15 minutes. Write down the    things that are on your mind. Plan time in the coming days to address those    issues. Brainstorm ideas on how you will deal with them. Try to identify issues    that  are out of your control, and try to let those issues go.  Listen to relaxation tapes   Classical Music or Nature sounds   Back Massage   Get regular exercise each day (at least 1-2 hours before bedtime)   Take medications only as directed   Eat a light bedtime snack or warm drink   Warm milk   Warm herbal tea (non-caffeinated)       Things to avoid   No overstimulating activities just before bed   No competitive games before bedtime   No exciting television programs before bedtime   Avoid caffeine after lunchtime   Avoid chocolate   Do not use alcohol to induce sleep (worsens Insomnia)   Do not take someone else's sleeping pills   Do not look at the clock when awakening   Do not turn on light when getting up to use bathroom, use a nightlight     Online Programs   www.BTC.sx (pronounced shut eye). There is a fee for this program. Enter the code  [facility]  if you decide to enroll in this program.    www.sleepIO.com (pronounced sleep ee oh). There is a fee for this program. Enter the code  [facility]  if you decide to enroll in this program.     Suggested Resources  Insomnia Treatment Books   Overcoming Insomnia by Poncho Franks and Azucena Zaidi (2008)  No More Sleepless Nights by Rd Nye and Whit Villaseñor (1996)  Say Margie to Insomnia by Chava Rock (2009)  The Insomnia Workbook by Medina Cruz and Mele Gonsales (2009)  The Insomnia Answer by Samuel Ugarte and Manolo Toro (2006)      Stress Management and Relaxation Books  The Relaxation and Stress Reduction Workbook by Marcelle Lal, Lynda Bobo and Sarkis Pastor (2008)  Stress Management Workbook: Techniques and Self-Assessment Procedures by Sharmin Hauser and Sami Rutherford (1997)  A Mindfulness-Based Stress Reduction Workbook by Burak Rea and Sumaya Mccall (2010)  The Complete Stress Management Workbook by Augie Amin, Reji Mathias and Oscar Pascual (1996)  Assert Yourself by Isela De La Fuente and Tommy BARRAZA  Sudhakar (1977)    Relaxation Resources for Computer Download   These websites offer resources to help you relax. This list is for information only. [facility] is not responsible for the quality of services or the actions of any person or organization.  Progressive Muscle Relaxation (PMR):   http://www.RentablesÂ®/progressive-muscle-relaxation-exercise.html   http://studentsupport.[facility]/counseling/resources/self-help/relaxation-and-stress-management/   Deep Breathing Exercises:  http://www.RentablesÂ®/breathing-awareness.html     Meditation:   wwwZaplox  www.IntellutionguidedPoshVinemeditation-site.com You may have to pay for some of these resources.    Guided Imagery:  http://www.RentablesÂ®/guided-imagery-scripts.html   http://eDabba/library/qbhkohwrae-tgwzgz-lclfuhv/   Consider phone apps such as: Calm, Headspace or Insight Timer.    Counseling / Behavioral Health  [facility]  Visit www.[facility].org or call [phone] to find a clinic close to you.  Or call [phone] for [facility].

## 2022-12-02 ENCOUNTER — TRANSFERRED RECORDS (OUTPATIENT)
Dept: HEALTH INFORMATION MANAGEMENT | Facility: CLINIC | Age: 77
End: 2022-12-02

## 2022-12-05 ENCOUNTER — MYC MEDICAL ADVICE (OUTPATIENT)
Dept: PEDIATRICS | Facility: CLINIC | Age: 77
End: 2022-12-05

## 2022-12-05 DIAGNOSIS — L29.9 ITCHING: Primary | ICD-10-CM

## 2022-12-06 NOTE — PROGRESS NOTES
DURABLE MEDICAL EQUIPMENT ORDER FAXED TO [facility] AT FAX. [phone]. FORM HAS BEEN SCANNED INTO MEDIA.

## 2022-12-12 ENCOUNTER — TRANSFERRED RECORDS (OUTPATIENT)
Dept: HEALTH INFORMATION MANAGEMENT | Facility: CLINIC | Age: 77
End: 2022-12-12

## 2022-12-12 LAB
CREATININE (EXTERNAL): 0.85 MG/DL (ref 0.76–1.27)
GFR ESTIMATED (EXTERNAL): 91 ML/MIN/1.73M2

## 2022-12-15 ENCOUNTER — DOCUMENTATION ONLY (OUTPATIENT)
Dept: SLEEP MEDICINE | Facility: CLINIC | Age: 77
End: 2022-12-15
Payer: COMMERCIAL

## 2022-12-15 DIAGNOSIS — G47.33 OBSTRUCTIVE SLEEP APNEA (ADULT) (PEDIATRIC): Primary | ICD-10-CM

## 2022-12-19 NOTE — PROGRESS NOTES
Patient was offered choice of vendor and chose [facility].  Patient Hood Arizmendi was set up at [facility] on December 19, 2022. Patient received a Resmed Airsense 11 Pressures were set at 8-15 cm H2O.   Patient s ramp is 5 cm H2O for Auto and FLEX/EPR is EPR, 2.  Patient received a Resmed Mask name: N20  Nasal mask size Medium, heated tubing and heated humidifier.  Patient does need to meet compliance. Patient has a follow up on 02/20/23 with Bennett Goltz, PA-C. Teresa M Johnson

## 2022-12-21 ENCOUNTER — TRANSFERRED RECORDS (OUTPATIENT)
Dept: HEALTH INFORMATION MANAGEMENT | Facility: CLINIC | Age: 77
End: 2022-12-21

## 2023-01-03 ENCOUNTER — TRANSFERRED RECORDS (OUTPATIENT)
Dept: HEALTH INFORMATION MANAGEMENT | Facility: CLINIC | Age: 78
End: 2023-01-03
Payer: COMMERCIAL

## 2023-01-11 ENCOUNTER — TRANSFERRED RECORDS (OUTPATIENT)
Dept: HEALTH INFORMATION MANAGEMENT | Facility: CLINIC | Age: 78
End: 2023-01-11

## 2023-01-30 ENCOUNTER — TRANSFERRED RECORDS (OUTPATIENT)
Dept: HEALTH INFORMATION MANAGEMENT | Facility: CLINIC | Age: 78
End: 2023-01-30

## 2023-02-17 ASSESSMENT — SLEEP AND FATIGUE QUESTIONNAIRES
HOW LIKELY ARE YOU TO NOD OFF OR FALL ASLEEP WHILE SITTING QUIETLY AFTER LUNCH WITHOUT ALCOHOL: WOULD NEVER DOZE
HOW LIKELY ARE YOU TO NOD OFF OR FALL ASLEEP WHEN YOU ARE A PASSENGER IN A CAR FOR AN HOUR WITHOUT A BREAK: WOULD NEVER DOZE
HOW LIKELY ARE YOU TO NOD OFF OR FALL ASLEEP WHILE WATCHING TV: WOULD NEVER DOZE
HOW LIKELY ARE YOU TO NOD OFF OR FALL ASLEEP WHILE LYING DOWN TO REST IN THE AFTERNOON WHEN CIRCUMSTANCES PERMIT: SLIGHT CHANCE OF DOZING
HOW LIKELY ARE YOU TO NOD OFF OR FALL ASLEEP WHILE SITTING INACTIVE IN A PUBLIC PLACE: WOULD NEVER DOZE
HOW LIKELY ARE YOU TO NOD OFF OR FALL ASLEEP IN A CAR, WHILE STOPPED FOR A FEW MINUTES IN TRAFFIC: WOULD NEVER DOZE
HOW LIKELY ARE YOU TO NOD OFF OR FALL ASLEEP WHILE SITTING AND TALKING TO SOMEONE: WOULD NEVER DOZE
HOW LIKELY ARE YOU TO NOD OFF OR FALL ASLEEP WHILE SITTING AND READING: WOULD NEVER DOZE

## 2023-02-20 ENCOUNTER — OFFICE VISIT (OUTPATIENT)
Dept: SLEEP MEDICINE | Facility: CLINIC | Age: 78
End: 2023-02-20
Payer: COMMERCIAL

## 2023-02-20 VITALS
WEIGHT: 241 LBS | BODY MASS INDEX: 30.93 KG/M2 | SYSTOLIC BLOOD PRESSURE: 120 MMHG | DIASTOLIC BLOOD PRESSURE: 77 MMHG | OXYGEN SATURATION: 97 % | HEART RATE: 65 BPM

## 2023-02-20 DIAGNOSIS — G47.33 OSA ON CPAP: Primary | ICD-10-CM

## 2023-02-20 DIAGNOSIS — E66.9 OBESITY WITH SLEEP APNEA: ICD-10-CM

## 2023-02-20 DIAGNOSIS — G47.30 OBESITY WITH SLEEP APNEA: ICD-10-CM

## 2023-02-20 DIAGNOSIS — F51.04 CHRONIC INSOMNIA: ICD-10-CM

## 2023-02-20 PROCEDURE — 99214 OFFICE O/P EST MOD 30 MIN: CPT | Performed by: INTERNAL MEDICINE

## 2023-02-20 NOTE — NURSING NOTE
"Chief Complaint   Patient presents with     Sleep Problem     DARYL, face to face compliance visit, discuss if mask is leaking       Initial /77   Pulse 65   Wt 109.3 kg (241 lb)   SpO2 97%   BMI 30.93 kg/m   Estimated body mass index is 30.93 kg/m  as calculated from the following:    Height as of 12/1/22: 1.88 m (6' 2.02\").    Weight as of this encounter: 109.3 kg (241 lb).    ESS 1  DILLON 8  DME: BEL Britt MA    "

## 2023-02-20 NOTE — PROCEDURES
[facility]  Sleep clinic follow-up visit note      Date on this visit:  February 20, 2023     Chief complaint: Follow-up of DARYL, review CPAP compliance measures    Hood Arizmendi is a 77-year-old male who was previously diagnosed with obstructive sleep apnea, who obtained a replacement CPAP equipment through [facility] on December 19, 2022. Patient received a Resmed Airsense 11 Pressures were set at 8-15 cm H2O. Patient s ramp is 5 cm H2O for Auto and FLEX/EPR is EPR, 2.  Patient received a Resmed Mask name: N20  Nasal mask size Medium, heated tubing and heated humidifier.  Patient does need to meet compliance. He presents to sleep clinic for follow-up of DARYL and to review CPAP compliance.  He also obtained a replacement CPAP equipment through [facility] recently.     He reports that he had a previous sleep study through a sleep center in the [place], [place] between 2001/2002 and the reports are unavailable     Previous WatchPATStudy Results:   Date: 11/16/21.  Weight 224 pounds.  AHI: 7/hr.  O2 rafi 85%.    He has been using the device he got through [facility] regularly during sleep. There are no reports of snoring, apneas or awakenings due to gasping for air with the CPAP use. Pressure settings on the device feel comfortable. He reports positive benefits with the CPAP use. He is currently using N20 Nasal mask size Medium. He reports air leaks despite securing the  mask tightly.  He goes to bed time after 12 midnight and wakes up around 6 AM to use the bathroom following which he can't get back to sleep. He denies active mind. He reports occasional fatigue if he has not slept adequately during the night before and also attributes the fatigue to the beta-blocker medication.  He denies excessive daytime sleepiness.  He denies drowsiness while driving.    DOWNLOADABLE COMPLIANCE DATA:   From January 20, 2023 through February 18, 2023 reveals that he used the device  for 30 out of 30 days with an averag use of 8 hours and 13 minutes on the days used. 95th percentile on leak was 37.4 L/min. Residual AHI was 0.4 per hour. Median pressure settings: 9.6; 95th percentile : 11.1 and max pressure: 12 cm water    Past medical/surgical history, family history, social history, medications and allergies were reviewed.       Problem list:  Patient Active Problem List   Diagnosis     Ulcerative colitis (H)     Hyperlipidemia with target LDL less than 100     DARYL (obstructive sleep apnea)     Kidney stones     Seasonal allergic rhinitis     Vitamin D deficiency     Carotid artery stenosis     Insomnia, unspecified type     Essential hypertension     Coronary artery disease involving native coronary artery of native heart without angina pectoris     Elevated prostate specific antigen (PSA)     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), wet (H)     Prostate cancer (H)     PAD (peripheral artery disease) (H)            Physical Examination:    /77   Pulse 65   Wt 109.3 kg (241 lb)   SpO2 97%   BMI 30.93 kg/m    General: Pleasant. Cooperative. In no apparent distress.  Pulmonary: Able to speak in full sentences easily. No cough or wheeze.   Neurologic: Alert, oriented x3.  Psychiatric: Mood euthymic. Affect congruent with full range and intensity.             Assessment and Plan:   Obstructive sleep apnea: Patient reports adequate compliance with CPAP treatment and reports positive benefits with device use. Based on compliance data, DARYL is well controlled with auto CPAP at the current pressure settings.   Interface concerns: Air leaks. Patient was instructed to f/u with [facility] medical DME provider to obtain help with mask fit optimization.  Recommended continue using CPAP regularly during sleep and instructed to get the supplies for the device replaced regularly. Patient was instructed  to bring CPAP with him if hospitalized and if anticipating procedure that requires  "sedation/surgery to be sure to discuss with the provider/surgeon that he has sleep apnea and uses CPAP therapy.    Chronic insomnia: We discussed stimulus control measures.  Patient was instructed to minimize exposure to bright light a couple levels before bedtime and avoid activities like reading or watching TV or using electronic devices in bed.  He was instructed to advance his bedtime gradually to 1030-11 PM and after he wakes up between 6 to 6:30 AM to use the bathroom, he was instructed to get out of the bed and soon after waking up and  was  recommended exposure to bright light using a light box of 10,000 Lux intensity for 30 to 60 minutes.  If insomnia does not improve he was instructed to let us know.    Obesity: We discussed weight management with diet and exercise.    Hypertension: Patient was instructed to follow-up with his cardiologist about the concern regarding fatigue with the beta-blocker.    Patient was strongly advised to avoid driving, operating any heavy machinery or other hazardous situations while drowsy or sleepy.  Patient was counseled on the importance of driving while alert, to pull over if drowsy, or nap before getting into the vehicle if sleepy.     Follow-up at sleep clinic in 1 year or sooner if any concerns.    The above note was dictated using voice recognition software. Although reviewed after completion, some word and grammatical error may remain . Please contact the author for any clarifications.     \" Total time spent was 30 minutes  for this appointment on this date of service which include time spent before, during and after the visit for chart review, patient care, counseling and coordination of care including documentation.\"    Anand Maxwell MD  [facility]  [address] [place] [address]      "

## 2023-02-23 NOTE — PROGRESS NOTES
[facility]  Sleep clinic follow-up visit note      Date on this visit:  February 20, 2023     Chief complaint: Follow-up of DARLY, review CPAP compliance measures    Hood Arizmendi is a 77-year-old male who was previously diagnosed with obstructive sleep apnea, who obtained a replacement CPAP equipment through [facility] on December 19, 2022. Patient received a Resmed Airsense 11 Pressures were set at 8-15 cm H2O. Patient s ramp is 5 cm H2O for Auto and FLEX/EPR is EPR, 2.  Patient received a Resmed Mask name: N20  Nasal mask size Medium, heated tubing and heated humidifier.  Patient does need to meet compliance. He presents to sleep clinic for follow-up of DARYL and to review CPAP compliance.  He also obtained a replacement CPAP equipment through [employer] recently.     He reports that he had a previous sleep study through a sleep center in the [place], [place] between 2001/2002 and the reports are unavailable     Previous WatchPATStudy Results:   Date: 11/16/21.  Weight 224 pounds.  AHI: 7/hr.  O2 rafi 85%.    He has been using the device he got through [facility] regularly during sleep. There are no reports of snoring, apneas or awakenings due to gasping for air with the CPAP use. Pressure settings on the device feel comfortable. He reports positive benefits with the CPAP use. He is currently using N20 Nasal mask size Medium. He reports air leaks despite securing the  mask tightly.  He goes to bed time after 12 midnight and wakes up around 6 AM to use the bathroom following which he can't get back to sleep. He denies active mind. He reports occasional fatigue if he has not slept adequately during the night before and also attributes the fatigue to the beta-blocker medication.  He denies excessive daytime sleepiness.  He denies drowsiness while driving.    DOWNLOADABLE COMPLIANCE DATA:   From January 20, 2023 through February 18, 2023 reveals that he used the device for  30 out of 30 days with an averag use of 8 hours and 13 minutes on the days used. 95th percentile on leak was 37.4 L/min. Residual AHI was 0.4 per hour. Median pressure settings: 9.6; 95th percentile : 11.1 and max pressure: 12 cm water    Past medical/surgical history, family history, social history, medications and allergies were reviewed.       Problem list:  Patient Active Problem List   Diagnosis     Ulcerative colitis (H)     Hyperlipidemia with target LDL less than 100     DARYL (obstructive sleep apnea)     Kidney stones     Seasonal allergic rhinitis     Vitamin D deficiency     Carotid artery stenosis     Insomnia, unspecified type     Essential hypertension     Coronary artery disease involving native coronary artery of native heart without angina pectoris     Elevated prostate specific antigen (PSA)     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), wet (H)     Prostate cancer (H)     PAD (peripheral artery disease) (H)            Physical Examination:    /77   Pulse 65   Wt 109.3 kg (241 lb)   SpO2 97%   BMI 30.93 kg/m    General: Pleasant. Cooperative. In no apparent distress.  Pulmonary: Able to speak in full sentences easily. No cough or wheeze.   Neurologic: Alert, oriented x3.  Psychiatric: Mood euthymic. Affect congruent with full range and intensity.             Assessment and Plan:   Obstructive sleep apnea: Patient reports adequate compliance with CPAP treatment and reports positive benefits with device use. Based on compliance data, DARYL is well controlled with auto CPAP at the current pressure settings.   Interface concerns: Air leaks. Patient was instructed to f/u with [facility] medical DME provider to obtain help with mask fit optimization.  Recommended continue using CPAP regularly during sleep and instructed to get the supplies for the device replaced regularly. Patient was instructed  to bring CPAP with him if hospitalized and if anticipating procedure that requires sedation/surgery  "to be sure to discuss with the provider/surgeon that he has sleep apnea and uses CPAP therapy.    Chronic insomnia: We discussed stimulus control measures.  Patient was instructed to minimize exposure to bright light a couple levels before bedtime and avoid activities like reading or watching TV or using electronic devices in bed.  He was instructed to advance his bedtime gradually to 1030-11 PM and after he wakes up between 6 to 6:30 AM to use the bathroom, he was instructed to get out of the bed and soon after waking up and  was  recommended exposure to bright light using a light box of 10,000 Lux intensity for 30 to 60 minutes.  If insomnia does not improve he was instructed to let us know.    Obesity: We discussed weight management with diet and exercise.    Hypertension: Patient was instructed to follow-up with his cardiologist about the concern regarding fatigue with the beta-blocker.    Patient was strongly advised to avoid driving, operating any heavy machinery or other hazardous situations while drowsy or sleepy.  Patient was counseled on the importance of driving while alert, to pull over if drowsy, or nap before getting into the vehicle if sleepy.     Follow-up at sleep clinic in 1 year or sooner if any concerns.    The above note was dictated using voice recognition software. Although reviewed after completion, some word and grammatical error may remain . Please contact the author for any clarifications.     \" Total time spent was 30 minutes  for this appointment on this date of service which include time spent before, during and after the visit for chart review, patient care, counseling and coordination of care including documentation.\"    Anand Maxwell MD  [facility]  [address] [place] [address]        "

## 2023-03-02 ENCOUNTER — TRANSFERRED RECORDS (OUTPATIENT)
Dept: HEALTH INFORMATION MANAGEMENT | Facility: CLINIC | Age: 78
End: 2023-03-02

## 2023-03-06 ENCOUNTER — LAB (OUTPATIENT)
Dept: LAB | Facility: CLINIC | Age: 78
End: 2023-03-06
Payer: COMMERCIAL

## 2023-03-06 DIAGNOSIS — C61 PROSTATE CANCER (H): ICD-10-CM

## 2023-03-06 LAB — PSA SERPL-MCNC: 0.2 NG/ML (ref 0–6.5)

## 2023-03-06 PROCEDURE — 84153 ASSAY OF PSA TOTAL: CPT

## 2023-03-06 PROCEDURE — 36415 COLL VENOUS BLD VENIPUNCTURE: CPT

## 2023-03-13 ENCOUNTER — TRANSFERRED RECORDS (OUTPATIENT)
Dept: HEALTH INFORMATION MANAGEMENT | Facility: CLINIC | Age: 78
End: 2023-03-13
Payer: COMMERCIAL

## 2023-05-02 DIAGNOSIS — I25.10 CORONARY ARTERY DISEASE INVOLVING NATIVE CORONARY ARTERY OF NATIVE HEART WITHOUT ANGINA PECTORIS: ICD-10-CM

## 2023-05-04 RX ORDER — AMLODIPINE BESYLATE 2.5 MG/1
TABLET ORAL
Qty: 90 TABLET | Refills: 0 | Status: SHIPPED | OUTPATIENT
Start: 2023-05-04 | End: 2023-05-30

## 2023-05-04 RX ORDER — ROSUVASTATIN CALCIUM 20 MG/1
TABLET, COATED ORAL
Qty: 90 TABLET | Refills: 0 | Status: SHIPPED | OUTPATIENT
Start: 2023-05-04 | End: 2023-05-30

## 2023-05-04 NOTE — TELEPHONE ENCOUNTER
Medication is being filled for 1 time refill only due to:  Patient needs labs Creatinine, LDL. Patient needs to be seen because it has been more than one year since last visit.     Next 5 appointments (look out 90 days)    May 30, 2023 10:00 AM  (Arrive by 9:40 AM)  Annual Wellness Visit with Michael Oliver MD  [facility] ([facility] ) [address] [place] [id number] [phone]        Ny DE LOS SANTOS RN, BSN

## 2023-05-21 ENCOUNTER — HEALTH MAINTENANCE LETTER (OUTPATIENT)
Age: 78
End: 2023-05-21

## 2023-05-29 SDOH — ECONOMIC STABILITY: FOOD INSECURITY: WITHIN THE PAST 12 MONTHS, YOU WORRIED THAT YOUR FOOD WOULD RUN OUT BEFORE YOU GOT MONEY TO BUY MORE.: NEVER TRUE

## 2023-05-29 SDOH — HEALTH STABILITY: PHYSICAL HEALTH: ON AVERAGE, HOW MANY MINUTES DO YOU ENGAGE IN EXERCISE AT THIS LEVEL?: 20 MIN

## 2023-05-29 SDOH — ECONOMIC STABILITY: INCOME INSECURITY: IN THE LAST 12 MONTHS, WAS THERE A TIME WHEN YOU WERE NOT ABLE TO PAY THE MORTGAGE OR RENT ON TIME?: NO

## 2023-05-29 SDOH — HEALTH STABILITY: PHYSICAL HEALTH: ON AVERAGE, HOW MANY DAYS PER WEEK DO YOU ENGAGE IN MODERATE TO STRENUOUS EXERCISE (LIKE A BRISK WALK)?: 4 DAYS

## 2023-05-29 SDOH — ECONOMIC STABILITY: INCOME INSECURITY: HOW HARD IS IT FOR YOU TO PAY FOR THE VERY BASICS LIKE FOOD, HOUSING, MEDICAL CARE, AND HEATING?: NOT HARD AT ALL

## 2023-05-29 SDOH — ECONOMIC STABILITY: FOOD INSECURITY: WITHIN THE PAST 12 MONTHS, THE FOOD YOU BOUGHT JUST DIDN'T LAST AND YOU DIDN'T HAVE MONEY TO GET MORE.: NEVER TRUE

## 2023-05-29 ASSESSMENT — SOCIAL DETERMINANTS OF HEALTH (SDOH)
IN A TYPICAL WEEK, HOW MANY TIMES DO YOU TALK ON THE PHONE WITH FAMILY, FRIENDS, OR NEIGHBORS?: ONCE A WEEK
HOW OFTEN DO YOU GET TOGETHER WITH FRIENDS OR RELATIVES?: TWICE A WEEK
DO YOU BELONG TO ANY CLUBS OR ORGANIZATIONS SUCH AS CHURCH GROUPS UNIONS, FRATERNAL OR ATHLETIC GROUPS, OR SCHOOL GROUPS?: YES
HOW OFTEN DO YOU ATTEND CHURCH OR RELIGIOUS SERVICES?: MORE THAN 4 TIMES PER YEAR

## 2023-05-29 ASSESSMENT — ENCOUNTER SYMPTOMS
COUGH: 0
SORE THROAT: 0
HEARTBURN: 0
JOINT SWELLING: 0
FREQUENCY: 0
CHILLS: 0
EYE PAIN: 0
ABDOMINAL PAIN: 0
MYALGIAS: 0
DIARRHEA: 0
HEMATOCHEZIA: 0
HEADACHES: 0
NERVOUS/ANXIOUS: 0
DIZZINESS: 0
CONSTIPATION: 0
SHORTNESS OF BREATH: 0
NAUSEA: 0
ARTHRALGIAS: 1
DYSURIA: 0
PARESTHESIAS: 0
HEMATURIA: 0
WEAKNESS: 0
PALPITATIONS: 0
FEVER: 0

## 2023-05-29 ASSESSMENT — ACTIVITIES OF DAILY LIVING (ADL): CURRENT_FUNCTION: NO ASSISTANCE NEEDED

## 2023-05-30 ENCOUNTER — OFFICE VISIT (OUTPATIENT)
Dept: PEDIATRICS | Facility: CLINIC | Age: 78
End: 2023-05-30
Payer: COMMERCIAL

## 2023-05-30 VITALS
WEIGHT: 241.5 LBS | HEART RATE: 56 BPM | HEIGHT: 74 IN | OXYGEN SATURATION: 96 % | DIASTOLIC BLOOD PRESSURE: 67 MMHG | RESPIRATION RATE: 16 BRPM | SYSTOLIC BLOOD PRESSURE: 108 MMHG | TEMPERATURE: 98 F | BODY MASS INDEX: 30.99 KG/M2

## 2023-05-30 DIAGNOSIS — I25.10 CORONARY ARTERY DISEASE INVOLVING NATIVE CORONARY ARTERY OF NATIVE HEART WITHOUT ANGINA PECTORIS: ICD-10-CM

## 2023-05-30 DIAGNOSIS — I10 ESSENTIAL HYPERTENSION: ICD-10-CM

## 2023-05-30 DIAGNOSIS — N20.0 KIDNEY STONE: ICD-10-CM

## 2023-05-30 DIAGNOSIS — H35.3290 EXUDATIVE AGE-RELATED MACULAR DEGENERATION, UNSPECIFIED LATERALITY, UNSPECIFIED STAGE (H): ICD-10-CM

## 2023-05-30 DIAGNOSIS — K51.90 ULCERATIVE COLITIS WITHOUT COMPLICATIONS, UNSPECIFIED LOCATION (H): ICD-10-CM

## 2023-05-30 DIAGNOSIS — N40.1 BENIGN PROSTATIC HYPERPLASIA WITH LOWER URINARY TRACT SYMPTOMS, SYMPTOM DETAILS UNSPECIFIED: ICD-10-CM

## 2023-05-30 DIAGNOSIS — E78.5 HYPERLIPIDEMIA WITH TARGET LDL LESS THAN 100: ICD-10-CM

## 2023-05-30 DIAGNOSIS — I73.9 PAD (PERIPHERAL ARTERY DISEASE) (H): ICD-10-CM

## 2023-05-30 DIAGNOSIS — Z00.00 ENCOUNTER FOR MEDICARE ANNUAL WELLNESS EXAM: Primary | ICD-10-CM

## 2023-05-30 DIAGNOSIS — G47.33 OSA (OBSTRUCTIVE SLEEP APNEA): ICD-10-CM

## 2023-05-30 DIAGNOSIS — C61 PROSTATE CANCER (H): ICD-10-CM

## 2023-05-30 DIAGNOSIS — I65.29 STENOSIS OF CAROTID ARTERY, UNSPECIFIED LATERALITY: ICD-10-CM

## 2023-05-30 LAB
ALBUMIN SERPL BCG-MCNC: 4.8 G/DL (ref 3.5–5.2)
ALP SERPL-CCNC: 64 U/L (ref 40–129)
ALT SERPL W P-5'-P-CCNC: 15 U/L (ref 10–50)
ANION GAP SERPL CALCULATED.3IONS-SCNC: 14 MMOL/L (ref 7–15)
AST SERPL W P-5'-P-CCNC: 18 U/L (ref 10–50)
BILIRUB SERPL-MCNC: 0.5 MG/DL
BUN SERPL-MCNC: 11.9 MG/DL (ref 8–23)
CALCIUM SERPL-MCNC: 9.6 MG/DL (ref 8.8–10.2)
CHLORIDE SERPL-SCNC: 103 MMOL/L (ref 98–107)
CHOLEST SERPL-MCNC: 146 MG/DL
CREAT SERPL-MCNC: 0.88 MG/DL (ref 0.67–1.17)
DEPRECATED HCO3 PLAS-SCNC: 24 MMOL/L (ref 22–29)
GFR SERPL CREATININE-BSD FRML MDRD: 89 ML/MIN/1.73M2
GLUCOSE SERPL-MCNC: 105 MG/DL (ref 70–99)
HDLC SERPL-MCNC: 47 MG/DL
LDLC SERPL CALC-MCNC: 59 MG/DL
NONHDLC SERPL-MCNC: 99 MG/DL
POTASSIUM SERPL-SCNC: 4.3 MMOL/L (ref 3.4–5.3)
PROT SERPL-MCNC: 5.9 G/DL (ref 6.4–8.3)
SODIUM SERPL-SCNC: 141 MMOL/L (ref 136–145)
TRIGL SERPL-MCNC: 201 MG/DL

## 2023-05-30 PROCEDURE — G0439 PPPS, SUBSEQ VISIT: HCPCS | Performed by: INTERNAL MEDICINE

## 2023-05-30 PROCEDURE — 80053 COMPREHEN METABOLIC PANEL: CPT | Performed by: INTERNAL MEDICINE

## 2023-05-30 PROCEDURE — 91313 COVID-19 BIVALENT 18+ (MODERNA): CPT | Performed by: INTERNAL MEDICINE

## 2023-05-30 PROCEDURE — 99214 OFFICE O/P EST MOD 30 MIN: CPT | Mod: 25 | Performed by: INTERNAL MEDICINE

## 2023-05-30 PROCEDURE — 80061 LIPID PANEL: CPT | Performed by: INTERNAL MEDICINE

## 2023-05-30 PROCEDURE — 36415 COLL VENOUS BLD VENIPUNCTURE: CPT | Performed by: INTERNAL MEDICINE

## 2023-05-30 PROCEDURE — 0134A COVID-19 BIVALENT 18+ (MODERNA): CPT | Performed by: INTERNAL MEDICINE

## 2023-05-30 RX ORDER — TAMSULOSIN HYDROCHLORIDE 0.4 MG/1
0.4 CAPSULE ORAL DAILY
Qty: 90 CAPSULE | Refills: 3 | Status: SHIPPED | OUTPATIENT
Start: 2023-05-30 | End: 2024-06-05

## 2023-05-30 RX ORDER — AMLODIPINE BESYLATE 2.5 MG/1
2.5 TABLET ORAL DAILY
Qty: 90 TABLET | Refills: 3 | Status: SHIPPED | OUTPATIENT
Start: 2023-05-30 | End: 2023-08-01

## 2023-05-30 RX ORDER — ROSUVASTATIN CALCIUM 20 MG/1
20 TABLET, COATED ORAL DAILY
Qty: 90 TABLET | Refills: 3 | Status: SHIPPED | OUTPATIENT
Start: 2023-05-30 | End: 2023-08-01

## 2023-05-30 RX ORDER — HYDROCODONE BITARTRATE AND ACETAMINOPHEN 5; 325 MG/1; MG/1
1 TABLET ORAL EVERY 6 HOURS PRN
Qty: 10 TABLET | Refills: 0 | Status: SHIPPED | OUTPATIENT
Start: 2023-05-30 | End: 2023-06-02

## 2023-05-30 ASSESSMENT — ENCOUNTER SYMPTOMS
MYALGIAS: 0
SHORTNESS OF BREATH: 0
HEARTBURN: 0
PARESTHESIAS: 0
EYE PAIN: 0
NAUSEA: 0
DYSURIA: 0
PALPITATIONS: 0
HEMATURIA: 0
CONSTIPATION: 0
HEADACHES: 0
HEMATOCHEZIA: 0
JOINT SWELLING: 0
ARTHRALGIAS: 1
NERVOUS/ANXIOUS: 0
DIARRHEA: 0
FREQUENCY: 0
ABDOMINAL PAIN: 0
FEVER: 0
SORE THROAT: 0
COUGH: 0
WEAKNESS: 0
DIZZINESS: 0
CHILLS: 0

## 2023-05-30 ASSESSMENT — ACTIVITIES OF DAILY LIVING (ADL): CURRENT_FUNCTION: NO ASSISTANCE NEEDED

## 2023-05-30 ASSESSMENT — PAIN SCALES - GENERAL: PAINLEVEL: NO PAIN (1)

## 2023-05-30 NOTE — PROGRESS NOTES
"SUBJECTIVE:   Harrison is a 78 year old who presents for Preventive Visit.      5/30/2023     9:49 AM   Additional Questions   Roomed by KELECHI Smith   Accompanied by Self         5/30/2023     9:49 AM   Patient Reported Additional Medications   Patient reports taking the following new medications Eye drop     Patient has been advised of split billing requirements and indicates understanding: Yes  Are you in the first 12 months of your Medicare coverage?  No    Healthy Habits:     In general, how would you rate your overall health?  Good    Frequency of exercise:  2-3 days/week    Duration of exercise:  15-30 minutes    Do you usually eat at least 4 servings of fruit and vegetables a day, include whole grains    & fiber and avoid regularly eating high fat or \"junk\" foods?  No    Taking medications regularly:  Yes    Medication side effects:  None    Ability to successfully perform activities of daily living:  No assistance needed    Home Safety:  Lack of grab bars in the bathroom    Hearing Impairment:  Difficulty following a conversation in a noisy restaurant or crowded room and difficulty understanding soft or whispered speech    In the past 6 months, have you been bothered by leaking of urine?  No    In general, how would you rate your overall mental or emotional health?  Good      PHQ-2 Total Score: 0    Additional concerns today:  No        Have you ever done Advance Care Planning? (For example, a Health Directive, POLST, or a discussion with a medical provider or your loved ones about your wishes): Yes, advance care planning is on file.    Fall risk  Fallen 2 or more times in the past year?: No  Any fall with injury in the past year?: No    Cognitive Screening   1) Repeat 3 items (Leader, Season, Table)    2) Clock draw: NORMAL  3) 3 item recall: Recalls 2 objects   Results: NORMAL clock, 1-2 items recalled: COGNITIVE IMPAIRMENT LESS LIKELY    Mini-CogTM Copyright S My. Licensed by the author for use in [facility]; reprinted with permission (soob@[facility]). All rights reserved.      Do you have sleep apnea, excessive snoring or daytime drowsiness?: no    Reviewed and updated as needed this visit by clinical staff   Tobacco  Allergies  Meds  Problems  Med Hx  Surg Hx           Follows with urology; on flomax (tamsulosin), which helps urgency.      No changes with cardiology; has another carotid doppler for this next winter.    Derm:  No new issues.    Ophthalmology:  AMD stable.      Left ear ringing at times; all day.  Pulsatile.   Sometimes unstable.               Reviewed and updated as needed this visit by Provider                 Social History     Tobacco Use     Smoking status: Never     Smokeless tobacco: Never   Vaping Use     Vaping status: Never Used   Substance Use Topics     Alcohol use: Yes     Types: 7 Standard drinks or equivalent per week     Comment: 2 per week             5/29/2023    11:58 AM   Alcohol Use   Prescreen: >3 drinks/day or >7 drinks/week? No          View : No data to display.              Do you have a current opioid prescription? Yes   How severe is your pain on a scale from 1-10? 1/10         Do you use any other controlled substances or medications that are not prescribed by a provider? None      Current providers sharing in care for this patient include:   Patient Care Team:  Michael Oliver MD as PCP - General (Internal Medicine)  Michael Oliver MD as Assigned PCP  William Terrell MD as MD (Urology)  Anand Maxwell MD as MD (Internal Medicine - Sleep Medicine)  Charlotte Kay MD as Assigned Heart and Vascular Provider  Anand Maxwell MD as Assigned Sleep Provider  Goltz, Bennett Ezra, PA-C as Assigned Neuroscience Provider    The following health maintenance items are reviewed in Epic and correct as of today:  Health Maintenance   Topic Date Due     MEDICARE ANNUAL WELLNESS VISIT  04/14/2023      ANNUAL REVIEW OF HM ORDERS  04/14/2023     FALL RISK ASSESSMENT  05/30/2024     LIPID  04/14/2027     ADVANCE CARE PLANNING  04/14/2027     DTAP/TDAP/TD IMMUNIZATION (3 - Td or Tdap) 05/15/2028     HEPATITIS C SCREENING  Completed     PHQ-2 (once per calendar year)  Completed     INFLUENZA VACCINE  Completed     Pneumococcal Vaccine: 65+ Years  Completed     ZOSTER IMMUNIZATION  Completed     COVID-19 Vaccine  Completed     IPV IMMUNIZATION  Aged Out     MENINGITIS IMMUNIZATION  Aged Out     COLORECTAL CANCER SCREENING  Discontinued     Lab work is in process  Labs reviewed in EPIC  BP Readings from Last 3 Encounters:   05/30/23 108/67   02/20/23 120/77   12/01/22 105/66    Wt Readings from Last 3 Encounters:   05/30/23 109.5 kg (241 lb 8 oz)   02/20/23 109.3 kg (241 lb)   12/01/22 103.4 kg (228 lb)                  Patient Active Problem List   Diagnosis     Ulcerative colitis (H)     Hyperlipidemia with target LDL less than 100     DARYL (obstructive sleep apnea)     Kidney stones     Seasonal allergic rhinitis     Vitamin D deficiency     Carotid artery stenosis     Insomnia, unspecified type     Essential hypertension     Coronary artery disease involving native coronary artery of native heart without angina pectoris     Elevated prostate specific antigen (PSA)     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), wet (H)     Prostate cancer (H)     PAD (peripheral artery disease) (H)     Past Surgical History:   Procedure Laterality Date     ABDOMEN SURGERY       APPENDECTOMY       APPENDECTOMY       BIOPSY  1980 on    colonoscopies     CARDIAC SURGERY  May 2011    Cardiac Cath     COLONOSCOPY  1980 on     CORONARY ANGIOGRAPHY ADULT ORDER  2011, 2016    med tx, small vessels     CYSTOSCOPY       EYE SURGERY  03/2017    retinal detachment      EYE SURGERY      lasix     HEAD & NECK SURGERY  Aug 11th & 19th, 2016    retina eye surgeries     NOSE SURGERY       SURGICAL HISTORY OF -       tonsils     SURGICAL HISTORY OF -        cystoscopy for kidney stones     SURGICAL HISTORY OF -       nasal surgery     TONSILLECTOMY         Social History     Tobacco Use     Smoking status: Never     Smokeless tobacco: Never   Vaping Use     Vaping status: Never Used   Substance Use Topics     Alcohol use: Yes     Types: 7 Standard drinks or equivalent per week     Comment: 2 per week     Family History   Problem Relation Age of Onset     C.A.D. Mother      Hypertension Mother      Hyperlipidemia Mother      Thyroid Disease Mother      C.A.D. Maternal Grandfather      Coronary Artery Disease Maternal Grandfather      Hyperlipidemia Maternal Grandfather      Cancer Father         lung cancer, smoker.          Current Outpatient Medications   Medication Sig Dispense Refill     amLODIPine (NORVASC) 2.5 MG tablet Take 1 tablet (2.5 mg) by mouth daily 90 tablet 3     aspirin 81 MG tablet Take 81 mg by mouth 2 times daily       balsalazide (COLAZAL) 750 MG capsule Take 2,250 mg by mouth 2 times daily Take 2 tablets twice daily       HYDROcodone-acetaminophen (NORCO) 5-325 MG tablet Take 1 tablet by mouth every 6 hours as needed for pain 10 tablet 0     isosorbide mononitrate (IMDUR) 30 MG 24 hr tablet Take 1 tablet (30 mg) by mouth daily 90 tablet 3     medical cannabis liquid (This is NOT a prescription, and does not certify that the patient has a qualifying medical condition for medical cannabis.  The purpose of this order is  to document that the patient reports taking medical cannabis.)       metoprolol succinate ER (TOPROL XL) 25 MG 24 hr tablet Take 1 tablet (25 mg) by mouth daily 90 tablet 3     netarsudil (RHOPRESSA) 0.02 % ophthalmic solution        nitroglycerin (NITROLINGUAL) 0.4 MG/SPRAY spray For chest pain spray 1 spray under tongue every 5 minutes for 3 doses. If symptoms persist 5 minutes after 1st dose call 911. 4.9 g 0     ORDER FOR DME Equipment being ordered: CPAP and supplies 1 each 0     polyethylene glycol (MIRALAX) 17 GM/Dose powder  Take 17 g by mouth daily 510 g 1     psyllium 63 % POWD Take 2 teaspoonful by mouth daily Mix in 8 ounces of water 1 Bottle 11     ranolazine (RANEXA) 500 MG 12 hr tablet TAKE 1 TABLET EVERY 12 HOURS 180 tablet 3     rosuvastatin (CRESTOR) 20 MG tablet Take 1 tablet (20 mg) by mouth daily 90 tablet 3     tamsulosin (FLOMAX) 0.4 MG capsule Take 1 capsule (0.4 mg) by mouth daily 90 capsule 3     timolol hemihydrate (BETIMOL) 0.5 % ophthalmic solution Place 1 drop into both eyes 2 times daily       Allergies   Allergen Reactions     Sulfa Antibiotics Hives             Review of Systems   Constitutional: Negative for chills and fever.   HENT: Negative for congestion, ear pain, hearing loss and sore throat.    Eyes: Negative for pain and visual disturbance.   Respiratory: Negative for cough and shortness of breath.    Cardiovascular: Negative for chest pain, palpitations and peripheral edema.   Gastrointestinal: Negative for abdominal pain, constipation, diarrhea, heartburn, hematochezia and nausea.   Genitourinary: Positive for impotence and urgency. Negative for dysuria, frequency, genital sores, hematuria and penile discharge.   Musculoskeletal: Positive for arthralgias. Negative for joint swelling and myalgias.   Skin: Negative for rash.   Neurological: Negative for dizziness, weakness, headaches and paresthesias.   Psychiatric/Behavioral: Negative for mood changes. The patient is not nervous/anxious.      CONSTITUTIONAL: NEGATIVE for fever, chills, change in weight  INTEGUMENTARY/SKIN: NEGATIVE for worrisome rashes, moles or lesions  EYES: NEGATIVE for vision changes or irritation  ENT/MOUTH: NEGATIVE for ear, mouth and throat problems  RESP: NEGATIVE for significant cough or SOB  BREAST: NEGATIVE for masses, tenderness or discharge  CV: NEGATIVE for chest pain, palpitations or peripheral edema  GI: NEGATIVE for nausea, abdominal pain, heartburn, or change in bowel habits  : NEGATIVE for frequency, dysuria, or  "hematuria  MUSCULOSKELETAL: NEGATIVE for significant arthralgias or myalgia  NEURO: NEGATIVE for weakness, dizziness or paresthesias  ENDOCRINE: NEGATIVE for temperature intolerance, skin/hair changes  HEME: NEGATIVE for bleeding problems  PSYCHIATRIC: NEGATIVE for changes in mood or affect    OBJECTIVE:   /67   Pulse 56   Temp 98  F (36.7  C) (Oral)   Resp 16   Ht 1.87 m (6' 1.62\")   Wt 109.5 kg (241 lb 8 oz)   SpO2 96%   BMI 31.33 kg/m   Estimated body mass index is 31.33 kg/m  as calculated from the following:    Height as of this encounter: 1.87 m (6' 1.62\").    Weight as of this encounter: 109.5 kg (241 lb 8 oz).  Physical Exam  GENERAL: healthy, alert and no distress  EYES: Eyes grossly normal to inspection, PERRL and conjunctivae and sclerae normal  HENT: ear canals and TM's normal, nose and mouth without ulcers or lesions  NECK: no adenopathy, no asymmetry, masses, or scars and thyroid normal to palpation  RESP: lungs clear to auscultation - no rales, rhonchi or wheezes  CV: regular rate and rhythm, normal S1 S2, no S3 or S4, no murmur, click or rub, no peripheral edema and peripheral pulses strong  ABDOMEN: soft, nontender, no hepatosplenomegaly, no masses and bowel sounds normal   (male): normal male genitalia without lesions or urethral discharge, no hernia  MS: no gross musculoskeletal defects noted, no edema  SKIN: no suspicious lesions or rashes  NEURO: Normal strength and tone, mentation intact and speech normal  PSYCH: mentation appears normal, affect normal/bright    Diagnostic Test Results:  Labs reviewed in Epic    ASSESSMENT / PLAN:   (Z00.00) Encounter for Medicare annual wellness exam  (primary encounter diagnosis)  Comment:   Plan: Discussed diet, exercise, testicular self exam, blood pressure, cholesterol, and need for cancer surveillance at appropriate ages.     (K51.90) Ulcerative colitis without complications, unspecified location (H)  Comment:   Plan: OFFICE/OUTPT " VISIT,EST,LEVL IV        Management per gastroenterology.     (H35.3290) Exudative age-related macular degeneration, unspecified laterality, unspecified stage (H)  Comment:   Plan: OFFICE/OUTPT VISIT,EST,LEVL IV        Management per ophthalmology.     (I73.9) PAD (peripheral artery disease) (H)  Comment:   Plan: OFFICE/OUTPT VISIT,EST,LEVL IV        Secondary risk factor modification. Asymptomatic.     (C61) Prostate cancer (H)  Comment:   Plan: OFFICE/OUTPT VISIT,EST,LEVL IV        Management per urology; on flomax (tamsulosin).    (G47.33) DARYL (obstructive sleep apnea)  Comment:   Plan: OFFICE/OUTPT VISIT,EST,LEVL IV        Uses continuous positive airway pressure.     (I10) Essential hypertension  Comment:   Plan: Comprehensive metabolic panel (BMP + Alb, Alk         Phos, ALT, AST, Total. Bili, TP), OFFICE/OUTPT         VISIT,EST,LEVL IV        At goal blood pressure.     (E78.5) Hyperlipidemia with target LDL less than 100  Comment:   Plan: Lipid panel reflex to direct LDL Fasting,         OFFICE/OUTPT VISIT,EST,LEVL IV        On statin; tolerating well.     (I25.10) Coronary artery disease involving native coronary artery of native heart without angina pectoris  Comment:   Plan: amLODIPine (NORVASC) 2.5 MG tablet,         rosuvastatin (CRESTOR) 20 MG tablet,         OFFICE/OUTPT VISIT,EST,LEVL IV        Secondary risk factor modification.     (I65.29) Stenosis of carotid artery, unspecified laterality  Comment:   Plan: OFFICE/OUTPT VISIT,EST,LEVL IV        Repeat doppler this fall.     (N20.0) Kidney stone  Comment:   Plan: HYDROcodone-acetaminophen (NORCO) 5-325 MG         tablet, OFFICE/OUTPT VISIT,EST,LEVL IV        For as needed use.     (N40.1) Benign prostatic hyperplasia with lower urinary tract symptoms, symptom details unspecified  Comment:   Plan: tamsulosin (FLOMAX) 0.4 MG capsule,         OFFICE/OUTPT VISIT,EST,LEVL IV          Dizziness; likely due to blood pressure changes; could lower beta  "blocker if still with symptoms despite slower changes in positioning .      Patient has been advised of split billing requirements and indicates understanding: Yes      COUNSELING:  Reviewed preventive health counseling, as reflected in patient instructions       Regular exercise       Healthy diet/nutrition       Vision screening       Hearing screening       Colon cancer screening       Prostate cancer screening      BMI:   Estimated body mass index is 31.33 kg/m  as calculated from the following:    Height as of this encounter: 1.87 m (6' 1.62\").    Weight as of this encounter: 109.5 kg (241 lb 8 oz).   Weight management plan: Patient referred to endocrine and/or weight management specialty      He reports that he has never smoked. He has never used smokeless tobacco.      Appropriate preventive services were discussed with this patient, including applicable screening as appropriate for cardiovascular disease, diabetes, osteopenia/osteoporosis, and glaucoma.  As appropriate for age/gender, discussed screening for colorectal cancer, prostate cancer, breast cancer, and cervical cancer. Checklist reviewing preventive services available has been given to the patient.    Reviewed patients plan of care and provided an AVS. The Basic Care Plan (routine screening as documented in Health Maintenance) for Hood meets the Care Plan requirement. This Care Plan has been established and reviewed with the Patient.          Michael Oliver MD  [facility]    Identified Health Risks:    I have reviewed Opioid Use Disorder and Substance Use Disorder risk factors and made any needed referrals.     "

## 2023-05-30 NOTE — PATIENT INSTRUCTIONS
Lab work today:  We can do labs in the exam room today, or you can get them done downstairs in the lab.      COVID michelle moderna shot today.    Refills given to last until year end.    Carotid ultrasound per the cardiologist at year end.    With respect to your diziness; likely it is from blood pressure changes; we can decrease meds if symptoms are significant, but for now, just change position slowly      Give hydrocodone/acetaminophen as needed for kidney stones, if occur.    Michael Oliver MD  Internal Medicine and Pediatrics         Patient Education   Personalized Prevention Plan  You are due for the preventive services outlined below.  Your care team is available to assist you in scheduling these services.  If you have already completed any of these items, please share that information with your care team to update in your medical record.  Health Maintenance Due   Topic Date Due    Annual Wellness Visit  04/14/2023    ANNUAL REVIEW OF HM ORDERS  04/14/2023

## 2023-07-28 DIAGNOSIS — I25.10 CORONARY ARTERY DISEASE INVOLVING NATIVE CORONARY ARTERY OF NATIVE HEART WITHOUT ANGINA PECTORIS: ICD-10-CM

## 2023-07-31 ENCOUNTER — TRANSFERRED RECORDS (OUTPATIENT)
Dept: HEALTH INFORMATION MANAGEMENT | Facility: CLINIC | Age: 78
End: 2023-07-31
Payer: COMMERCIAL

## 2023-08-01 RX ORDER — AMLODIPINE BESYLATE 2.5 MG/1
TABLET ORAL
Qty: 90 TABLET | Refills: 3 | Status: SHIPPED | OUTPATIENT
Start: 2023-08-01 | End: 2024-06-05

## 2023-08-01 RX ORDER — ROSUVASTATIN CALCIUM 20 MG/1
TABLET, COATED ORAL
Qty: 90 TABLET | Refills: 3 | Status: SHIPPED | OUTPATIENT
Start: 2023-08-01 | End: 2024-06-05

## 2023-08-01 RX ORDER — RANOLAZINE 500 MG/1
TABLET, EXTENDED RELEASE ORAL
Qty: 180 TABLET | Refills: 3 | Status: SHIPPED | OUTPATIENT
Start: 2023-08-01 | End: 2024-03-27

## 2023-08-01 NOTE — TELEPHONE ENCOUNTER
Routing refill request to provider for review/approval because:  Ranolazine on active list and last ordered in September 2022, but no protocol details for RN to review and pass/fail.    Other medications refills during last visit in May.

## 2023-09-08 ENCOUNTER — TELEPHONE (OUTPATIENT)
Dept: PEDIATRICS | Facility: CLINIC | Age: 78
End: 2023-09-08
Payer: COMMERCIAL

## 2023-09-08 DIAGNOSIS — C61 PROSTATE CANCER (H): Primary | ICD-10-CM

## 2023-09-08 NOTE — PROGRESS NOTES
Harrison Reddy has a lab appt on 09/12/23 but there are no orders.  Please place the needed future labs for the blood draw.     Thank you,    Radha Barbour

## 2023-09-12 ENCOUNTER — LAB (OUTPATIENT)
Dept: LAB | Facility: CLINIC | Age: 78
End: 2023-09-12
Payer: COMMERCIAL

## 2023-09-12 DIAGNOSIS — C61 PROSTATE CANCER (H): ICD-10-CM

## 2023-09-12 PROCEDURE — 36415 COLL VENOUS BLD VENIPUNCTURE: CPT

## 2023-09-12 PROCEDURE — 84153 ASSAY OF PSA TOTAL: CPT

## 2023-09-12 NOTE — TELEPHONE ENCOUNTER
Harrison is calling in, he was under the impression that he needed his PSA checked every 6 months? Lab appt @11:15am- please advise. Will follow up with pt at [phone] when info received.

## 2023-09-13 LAB — PSA SERPL DL<=0.01 NG/ML-MCNC: 0.16 NG/ML (ref 0–6.5)

## 2023-09-29 ENCOUNTER — TELEPHONE (OUTPATIENT)
Dept: CARDIOLOGY | Facility: CLINIC | Age: 78
End: 2023-09-29

## 2023-09-29 NOTE — TELEPHONE ENCOUNTER
[facility] Call Center    Phone Message    May a detailed message be left on voicemail: yes     Reason for Call: Other: Pt would like a call back to schedule a carotid ultrasound in [facility], orders in [facility]     Action Taken: Other: Cardio    Travel Screening: Not Applicable

## 2023-10-02 NOTE — TELEPHONE ENCOUNTER
Hi Team,    Per [facility] pt wants to schedule pt only wants RU they are booked out end of Nov order will  prior that date can someone please help ext this order to Dec 2023 so pt can get this schedule.    Dino

## 2023-11-03 DIAGNOSIS — I25.10 CORONARY ARTERY DISEASE INVOLVING NATIVE CORONARY ARTERY OF NATIVE HEART WITHOUT ANGINA PECTORIS: ICD-10-CM

## 2023-11-03 RX ORDER — METOPROLOL SUCCINATE 25 MG/1
25 TABLET, EXTENDED RELEASE ORAL DAILY
Qty: 90 TABLET | Refills: 1 | Status: SHIPPED | OUTPATIENT
Start: 2023-11-03 | End: 2024-03-05

## 2023-11-03 RX ORDER — ISOSORBIDE MONONITRATE 30 MG/1
30 TABLET, EXTENDED RELEASE ORAL DAILY
Qty: 90 TABLET | Refills: 3 | OUTPATIENT
Start: 2023-11-03

## 2023-11-03 RX ORDER — ISOSORBIDE MONONITRATE 30 MG/1
30 TABLET, EXTENDED RELEASE ORAL DAILY
Qty: 90 TABLET | Refills: 1 | Status: SHIPPED | OUTPATIENT
Start: 2023-11-03 | End: 2024-03-05

## 2023-11-03 RX ORDER — METOPROLOL SUCCINATE 25 MG/1
25 TABLET, EXTENDED RELEASE ORAL DAILY
Qty: 90 TABLET | Refills: 3 | OUTPATIENT
Start: 2023-11-03

## 2024-01-05 ENCOUNTER — TRANSFERRED RECORDS (OUTPATIENT)
Dept: HEALTH INFORMATION MANAGEMENT | Facility: CLINIC | Age: 79
End: 2024-01-05
Payer: COMMERCIAL

## 2024-01-10 ENCOUNTER — TRANSFERRED RECORDS (OUTPATIENT)
Dept: HEALTH INFORMATION MANAGEMENT | Facility: CLINIC | Age: 79
End: 2024-01-10
Payer: COMMERCIAL

## 2024-01-10 LAB
CREATININE (EXTERNAL): 0.89 MG/DL (ref 0.76–1.27)
GFR ESTIMATED (EXTERNAL): 89 ML/MIN/1.73

## 2024-01-16 ENCOUNTER — HOSPITAL ENCOUNTER (OUTPATIENT)
Dept: CARDIOLOGY | Facility: CLINIC | Age: 79
Discharge: HOME | End: 2024-01-16
Attending: INTERNAL MEDICINE | Admitting: INTERNAL MEDICINE
Payer: COMMERCIAL

## 2024-01-16 DIAGNOSIS — R09.89 BILATERAL CAROTID BRUITS: ICD-10-CM

## 2024-01-16 PROCEDURE — 93880 EXTRACRANIAL BILAT STUDY: CPT

## 2024-01-16 PROCEDURE — 93880 EXTRACRANIAL BILAT STUDY: CPT | Mod: 26 | Performed by: INTERNAL MEDICINE

## 2024-03-05 ENCOUNTER — OFFICE VISIT (OUTPATIENT)
Dept: CARDIOLOGY | Facility: CLINIC | Age: 79
End: 2024-03-05
Payer: COMMERCIAL

## 2024-03-05 VITALS
HEIGHT: 74 IN | SYSTOLIC BLOOD PRESSURE: 103 MMHG | OXYGEN SATURATION: 94 % | HEART RATE: 66 BPM | BODY MASS INDEX: 31.76 KG/M2 | DIASTOLIC BLOOD PRESSURE: 65 MMHG | WEIGHT: 247.5 LBS

## 2024-03-05 DIAGNOSIS — I25.10 CORONARY ARTERY DISEASE INVOLVING NATIVE CORONARY ARTERY OF NATIVE HEART WITHOUT ANGINA PECTORIS: ICD-10-CM

## 2024-03-05 PROCEDURE — 99214 OFFICE O/P EST MOD 30 MIN: CPT | Performed by: INTERNAL MEDICINE

## 2024-03-05 RX ORDER — NITROGLYCERIN 0.4 MG/1
TABLET SUBLINGUAL
Qty: 30 TABLET | Refills: 3 | Status: SHIPPED | OUTPATIENT
Start: 2024-03-05

## 2024-03-05 RX ORDER — TRAVOPROST OPHTHALMIC SOLUTION 0.04 MG/ML
1 SOLUTION OPHTHALMIC DAILY
COMMUNITY
Start: 2024-02-21

## 2024-03-05 RX ORDER — METOPROLOL SUCCINATE 25 MG/1
25 TABLET, EXTENDED RELEASE ORAL DAILY
Qty: 90 TABLET | Refills: 3 | Status: SHIPPED | OUTPATIENT
Start: 2024-03-05

## 2024-03-05 RX ORDER — ISOSORBIDE MONONITRATE 30 MG/1
30 TABLET, EXTENDED RELEASE ORAL DAILY
Qty: 90 TABLET | Refills: 3 | Status: CANCELLED | OUTPATIENT
Start: 2024-03-05

## 2024-03-05 NOTE — LETTER
3/5/2024    Michael Oliver MD  [address] [place] [address]    RE: Hood Arizmendi       Dear Colleague,     I had the pleasure of seeing Hood Arizmendi in the [facility].        Cardiology    I had the pleasure to follow up with your patient, Mr. Hood Arizmendi, in the Cardiovascular Medicine Clinic.  As you recall, he is a very pleasant 78-year-old gentleman who I got acquainted with when he moved to the [place] area.  He has known coronary artery disease, namely small vessel disease.  The posterolateral branch is 100% occluded with moderate disease elsewhere.  His coronary angiogram was performed in 2016, which confirms these findings.         From a cardiac perspective, this gentleman is doing well.  He has not noticed any chest pain, PND, orthopnea, syncope or any other cardiac related concerns.    He has intentionally lost over 20 pounds over the past year.  Here for his routine follow-up visit.    Echo 2022    Left ventricular systolic function is normal.  No gross wall motion abnormalities noted, but overall assessment for this is  challenging given lack of contrast.  The right ventricle is normal in size and function.  No hemodynamically significant valvular disease.  The inferior vena cava was normal in size with preserved respiratory  variability.        Echo 2020  1. Normal left ventricular size and systolic function. LVEF 55-60%.  2. No regional wall motion abnormalities.  3. Normal right ventricular size and systolic function.  4. No significant valve disease.      Carotid US: 2024    Right side:      Plaque Morphology: Mild, homogenous        Proximal CCA: 107 cm/sec     Distal CCA: 85 cm/sec     ICA: 67/26  cm/sec        External CA: 66 cm/sec     Vertebral artery: Antegrade flow 28  cm/sec      ICA/CCA ratio: 0.79      Left side:      Plaque: Mild, homogenous.          Proximal CCA: 85 cm/sec     Distal CCA: 78 cm/sec     ICA: 62/17  cm/sec        External CA: 69  cm/sec     Vertebral artery: Antegrade flow 24  cm/sec      ICA/CCA ratio: 0.79        PHYSICAL EXAMINATION:   VITAL SIGNS:  There were no vitals taken for this visit.    GENERAL: Healthy, alert and no distress  EYES: Eyes grossly normal to inspection.  No discharge or erythema, or obvious scleral/conjunctival abnormalities.  RESP: No audible wheeze, cough, or visible cyanosis.  No visible retractions or increased work of breathing.    SKIN: Visible skin clear. No significant rash, abnormal pigmentation or lesions.  NEURO: Cranial nerves grossly intact.  Mentation and speech appropriate for age.  PSYCH: Mentation appears normal, affect normal/bright, judgement and insight intact, normal speech and appearance well-groomed.         ASSESSMENT:   1.  Chest discomfort with known single-vessel occlusive coronary artery disease, namely ostial posterolateral branch with ipsilateral collateral filling by angiogram in 2016. Resolved  2.  Mild disease elsewhere.   3.  Normal LV systolic function.   4.  Mild carotid artery stenosis.   5.  Eye surgery    6.  Hypertension.   7.  Hyperlipidemia.   8.  Prostate Ca S/P radiotherapy     RECOMMENDATIONS:   1.  Atherosclerotic coronary artery disease, stable.  He is on appropriate guideline-directed medical therapy.   No return of symptoms.  Echocardiogram reviewed and was stable  2.  Peripheral arterial disease.  Reviewed carotid ultrasound stable compared to prior study  3.  Hypertension: Patient has had low blood pressure recently.  He feels a little fatigued.  As a result we will discontinue his isosorbide mononitrate.    4.  Hyperlipidemia.  Let us continue with statin therapy.  LDL is at goal from this years check.  Continue with statin.    5.  Patient is overall doing well.  He is looking forward to his cruise in [place] in 2 weeks.  I have given him a new prescription for sublingual nitroglycerin.  6.  Patient return to clinic in 1 years time with pre-clinic lab work and an  echocardiogram        Charlotte Kay MD              Thank you for allowing me to participate in the care of your patient.      Sincerely,     Charlotte Kay MD     [facility]  cc:   Charlotte Kay MD  [address] [place] [id number]

## 2024-03-05 NOTE — PROGRESS NOTES
Cardiology    I had the pleasure to follow up with your patient, Mr. Hood Arizmendi, in the Cardiovascular Medicine Clinic.  As you recall, he is a very pleasant 78-year-old gentleman who I got acquainted with when he moved to the [place] area.  He has known coronary artery disease, namely small vessel disease.  The posterolateral branch is 100% occluded with moderate disease elsewhere.  His coronary angiogram was performed in 2016, which confirms these findings.         From a cardiac perspective, this gentleman is doing well.  He has not noticed any chest pain, PND, orthopnea, syncope or any other cardiac related concerns.    He has intentionally lost over 20 pounds over the past year.  Here for his routine follow-up visit.    Echo 2022    Left ventricular systolic function is normal.  No gross wall motion abnormalities noted, but overall assessment for this is  challenging given lack of contrast.  The right ventricle is normal in size and function.  No hemodynamically significant valvular disease.  The inferior vena cava was normal in size with preserved respiratory  variability.        Echo 2020  1. Normal left ventricular size and systolic function. LVEF 55-60%.  2. No regional wall motion abnormalities.  3. Normal right ventricular size and systolic function.  4. No significant valve disease.      Carotid US: 2024    Right side:      Plaque Morphology: Mild, homogenous        Proximal CCA: 107 cm/sec     Distal CCA: 85 cm/sec     ICA: 67/26  cm/sec        External CA: 66 cm/sec     Vertebral artery: Antegrade flow 28  cm/sec      ICA/CCA ratio: 0.79      Left side:      Plaque: Mild, homogenous.          Proximal CCA: 85 cm/sec     Distal CCA: 78 cm/sec     ICA: 62/17  cm/sec        External CA: 69 cm/sec     Vertebral artery: Antegrade flow 24  cm/sec      ICA/CCA ratio: 0.79        PHYSICAL EXAMINATION:   VITAL SIGNS:  There were no vitals taken for this visit.    GENERAL: Healthy, alert and no  distress  EYES: Eyes grossly normal to inspection.  No discharge or erythema, or obvious scleral/conjunctival abnormalities.  RESP: No audible wheeze, cough, or visible cyanosis.  No visible retractions or increased work of breathing.    SKIN: Visible skin clear. No significant rash, abnormal pigmentation or lesions.  NEURO: Cranial nerves grossly intact.  Mentation and speech appropriate for age.  PSYCH: Mentation appears normal, affect normal/bright, judgement and insight intact, normal speech and appearance well-groomed.         ASSESSMENT:   1.  Chest discomfort with known single-vessel occlusive coronary artery disease, namely ostial posterolateral branch with ipsilateral collateral filling by angiogram in 2016. Resolved  2.  Mild disease elsewhere.   3.  Normal LV systolic function.   4.  Mild carotid artery stenosis.   5.  Eye surgery    6.  Hypertension.   7.  Hyperlipidemia.   8.  Prostate Ca S/P radiotherapy     RECOMMENDATIONS:   1.  Atherosclerotic coronary artery disease, stable.  He is on appropriate guideline-directed medical therapy.   No return of symptoms.  Echocardiogram reviewed and was stable  2.  Peripheral arterial disease.  Reviewed carotid ultrasound stable compared to prior study  3.  Hypertension: Patient has had low blood pressure recently.  He feels a little fatigued.  As a result we will discontinue his isosorbide mononitrate.    4.  Hyperlipidemia.  Let us continue with statin therapy.  LDL is at goal from this years check.  Continue with statin.    5.  Patient is overall doing well.  He is looking forward to his cruise in [place] in 2 weeks.  I have given him a new prescription for sublingual nitroglycerin.  6.  Patient return to clinic in 1 years time with pre-clinic lab work and an echocardiogram        Charlotte Kay MD

## 2024-03-27 ENCOUNTER — NURSE TRIAGE (OUTPATIENT)
Dept: PEDIATRICS | Facility: CLINIC | Age: 79
End: 2024-03-27
Payer: COMMERCIAL

## 2024-03-27 ENCOUNTER — VIRTUAL VISIT (OUTPATIENT)
Dept: FAMILY MEDICINE | Facility: CLINIC | Age: 79
End: 2024-03-27
Payer: COMMERCIAL

## 2024-03-27 DIAGNOSIS — C61 PROSTATE CANCER (H): ICD-10-CM

## 2024-03-27 DIAGNOSIS — K51.90 ULCERATIVE COLITIS WITHOUT COMPLICATIONS, UNSPECIFIED LOCATION (H): ICD-10-CM

## 2024-03-27 DIAGNOSIS — I73.9 PAD (PERIPHERAL ARTERY DISEASE) (H): ICD-10-CM

## 2024-03-27 DIAGNOSIS — H35.3290 EXUDATIVE AGE-RELATED MACULAR DEGENERATION, UNSPECIFIED LATERALITY, UNSPECIFIED STAGE (H): ICD-10-CM

## 2024-03-27 DIAGNOSIS — U07.1 COVID-19 VIRUS INFECTION: Primary | ICD-10-CM

## 2024-03-27 DIAGNOSIS — I25.118 CORONARY ARTERY DISEASE OF NATIVE ARTERY OF NATIVE HEART WITH STABLE ANGINA PECTORIS (H): ICD-10-CM

## 2024-03-27 PROCEDURE — 99442 PR PHYSICIAN TELEPHONE EVALUATION 11-20 MIN: CPT | Mod: 93 | Performed by: FAMILY MEDICINE

## 2024-03-27 RX ORDER — RANOLAZINE 500 MG/1
500 TABLET, EXTENDED RELEASE ORAL EVERY 12 HOURS
COMMUNITY
Start: 2023-08-01 | End: 2024-08-13

## 2024-03-27 NOTE — TELEPHONE ENCOUNTER
RN COVID TREATMENT VISIT  03/27/24      The patient has been triaged and does not require a higher level of care.    Hood Arizmendi  78 year old  Current weight? 247 lbs    Has the patient been seen by a primary care provider at an [facility] or [facility] within the past two years? Yes.   Have you been in close proximity to/do you have a known exposure to a person with a confirmed case of influenza? No.     General treatment eligibility:  Date of positive COVID test (PCR or at home)?  3/27/24    Are you or have you been hospitalized for this COVID-19 infection? No.   Have you received monoclonal antibodies or antiviral treatment for COVID-19 since this positive test? No.   Do you have any of the following conditions that place you at risk of being very sick from COVID-19?   - Age 50 years or older  Yes, patient has at least one high risk condition as noted above.     Current COVID symptoms:   - fever or chills  - cough  - fatigue  - congestion or runny nose  Yes. Patient has at least one symptom as selected.     How many days since symptoms started? 5 days or less. Established patient, 12 years or older weighing at least 88.2 lbs, who has symptoms that started in the past 5 days, has not been hospitalized nor received treatment already, and is at risk for being very sick from COVID-19.     Treatment eligibility by RN:  Are you currently pregnant or nursing? No  Do you have a clinically significant hypersensitivity to nirmatrelvir or ritonavir, or toxic epidermal necrolysis (TEN) or Morales-Bertrand Syndrome? No  Do you have a history of hepatitis, any hepatic impairment on the Problem List (such as Child-Price Class C, cirrhosis, fatty liver disease, alcoholic liver disease), or was the last liver lab (hepatic panel, ALT, AST, ALK Phos, bilirubin) elevated in the past 6 months? No  Do you have any history of severe renal impairment (eGFR < 30mL/min)? No    Is patient eligible to continue?  Yes, patient meets all eligibility requirements for the RN COVID treatment (as denoted by all no responses above).     Current Outpatient Medications   Medication Sig Dispense Refill    amLODIPine (NORVASC) 2.5 MG tablet TAKE 1 TABLET DAILY 90 tablet 3    aspirin 81 MG tablet Take 81 mg by mouth 2 times daily OTC      balsalazide (COLAZAL) 750 MG capsule Take 2,250 mg by mouth 2 times daily Take 2 tablets twice daily      medical cannabis liquid (This is NOT a prescription, and does not certify that the patient has a qualifying medical condition for medical cannabis.  The purpose of this order is  to document that the patient reports taking medical cannabis.)      metoprolol succinate ER (TOPROL XL) 25 MG 24 hr tablet Take 1 tablet (25 mg) by mouth daily 90 tablet 3    netarsudil (RHOPRESSA) 0.02 % ophthalmic solution Place 1 drop into both eyes at bedtime      nitroglycerin (NITROLINGUAL) 0.4 MG/SPRAY spray For chest pain spray 1 spray under tongue every 5 minutes for 3 doses. If symptoms persist 5 minutes after 1st dose call 911. 4.9 g 0    nitroGLYcerin (NITROSTAT) 0.4 MG sublingual tablet For chest pain place 1 tablet under the tongue every 5 minutes for 3 doses. If symptoms persist 5 minutes after 1st dose call 911. 30 tablet 3    ORDER FOR DME Equipment being ordered: CPAP and supplies 1 each 0    polyethylene glycol (MIRALAX) 17 GM/Dose powder Take 17 g by mouth daily 510 g 1    psyllium 63 % POWD Take 2 teaspoonful by mouth daily Mix in 8 ounces of water 1 Bottle 11    ranolazine (RANEXA) 500 MG 12 hr tablet TAKE 1 TABLET EVERY 12 HOURS 180 tablet 3    rosuvastatin (CRESTOR) 20 MG tablet TAKE 1 TABLET DAILY 90 tablet 3    tamsulosin (FLOMAX) 0.4 MG capsule Take 1 capsule (0.4 mg) by mouth daily 90 capsule 3    timolol hemihydrate (BETIMOL) 0.5 % ophthalmic solution Place 1 drop into both eyes 2 times daily      travoprost FITO FREE (TRAVATAN Z) 0.004 % ophthalmic solution Place 1 drop into both eyes daily          Medications from List 1 of the standing order (on medications that exclude the use of Paxlovid) that patient is taking: NONE. Is patient taking [place]'s Wort? No  Is patient taking Elvie's Wort or any meds from List 1? No.   Medications from List 2 of the standing order (on meds that provider needs to adjust) that patient is taking: amlodipine (Norvasc), explained a provider visit is necessary to discuss medication adjustments while taking Paxlovid. Is patient on any of the meds from List 2? Yes. Patient is also taking tamsulosin and rosuvastatin. Patient will be scheduled or transferred to a  at the end of this call.   Thompson Salinas RN

## 2024-03-27 NOTE — PROGRESS NOTES
"    Instructions Relayed to Patient by Virtual Roomer:     Patient is active on [employer]:   Relayed following to patient: \"It looks like you are active on [employer], are you able to join the visit this way? If not, do you need us to send you a link now or would you like your provider to send a link via text or email when they are ready to initiate the visit?\"    Reminded patient to ensure they were logged on to virtual visit by arrival time listed. Documented in appointment notes if patient had flexibility to initiate visit sooner than arrival time. If pediatric virtual visit, ensured pediatric patient along with parent/guardian will be present for video visit.     Patient offered the website [email or web address] and/or phone number to [employer] Help line: [phone]    Harrison is a 78 year old who is being evaluated via a billable telephone visit.    What phone number would you like to be contacted at? [phone]   How would you like to obtain your AVS? [employer]  Originating Location (pt. Location): Home    Distant Location (provider location):  On-site    Assessment & Plan     (U07.1) COVID-19 virus infection  (primary encounter diagnosis)  Comment: Seems like a mild infection right now.   Plan: nirmatrelvir and ritonavir (PAXLOVID) 300         mg/100 mg therapy pack        Return in about 1 week (around 4/3/2024) for recheck if symptoms fail to resolve by then, or sooner if symptoms worsen, in the office.      (H32.1728) Exudative age-related macular degeneration, unspecified laterality, unspecified stage (H)  Comment: He continues to see the retinal specialist to treat this and his glaucoma.    Plan:     The following diagnoses increase his risk for severe COVID-19 infection.     (C61) Prostate cancer (H)  (K51.90) Ulcerative colitis without complications, unspecified location (H)  (I73.9) PAD (peripheral artery disease) (H24)  (I25.118) Coronary artery disease of native artery of native heart with " stable angina pectoris (H24)  Comment: He takes rosuvastatin for PAD and CAD.   Plan: he is instructed to hold rosuvastatin while taking Paxlovid.          30 minutes spent by me on the date of the encounter doing chart review, review of test results, patient visit, and documentation           Subjective   Harrison is a 78 year old, presenting for the following health issues:  Medication Request (Covid Treatment)        3/27/2024     1:56 PM   Additional Questions   Roomed by Allan CAMERON     History of Present Illness       Reason for visit:  Covid Treatment          COVID-19 Symptom Review  How many days ago did these symptoms start? Symptoms started Sunday (3/24) and tested positive 3/27    Are any of the following symptoms significant for you?  New or worsening difficulty breathing? No  Worsening cough? Yes, I am coughing up mucus but it is not worsening  Fever or chills? Yes, I felt feverish or had chills.  Headache: No  Sore throat: YES- Tightness  Chest pain: No  Diarrhea: No  Body aches? YES    What treatments has patient tried? Sudafed, Tylenol  Does patient live in a nursing home, group home, or shelter? No  Does patient have a way to get food/medications during quarantined? Yes, I have a friend or family member who can help me.            Review of Systems       Objective           Vitals:  No vitals were obtained today due to virtual visit.    Physical Exam   General: Alert and no distress //Respiratory: No audible wheeze, cough, or shortness of breath // Psychiatric:  Appropriate affect, tone, and pace of words     Latest Reference Range & Units 05/30/23 10:34   Creatinine 0.67 - 1.17 mg/dL 0.88   GFR Estimate >60 mL/min/1.73m2 89           Phone call duration: 12 minutes    Signed Electronically by: Juvenal Harvey MD    This document serves as a record of the services and decisions personally performed and made by Dr. Harvey. It was created on his behalf by Jose Antonio Ansari, a trained medical scribe. The creation  of this document is based the provider's statements to the medical scribe.  Jose Antonio Ansari,  3:00 PM

## 2024-03-27 NOTE — TELEPHONE ENCOUNTER
S: Positive home Covid test today  B: symptoms began 3/24/24  A: Cough, nasal congestion, fatigue, loss of taste/smell, fever, chills,     Denies: shortness of breath, chest pain, asthma, heart or lung disease,     R: Routing to Covid RN Hub. Patient is interested in treatment with Paxlovid.    Pema Thurston RN     Reason for Disposition   HIGH RISK patient (e.g., weak immune system, age > 64 years, obesity with BMI of 30 or higher, pregnant, chronic lung disease or other chronic medical condition) and COVID symptoms (e.g., cough, fever)  (Exceptions: Already seen by doctor or NP/PA and no new or worsening symptoms.)    Additional Information   Negative: SEVERE difficulty breathing (e.g., struggling for each breath, speaks in single words)   Negative: Difficult to awaken or acting confused (e.g., disoriented, slurred speech)   Negative: Bluish (or gray) lips or face now   Negative: Shock suspected (e.g., cold/pale/clammy skin, too weak to stand, low BP, rapid pulse)   Negative: Sounds like a life-threatening emergency to the triager   Negative: Chest pain or pressure  (Exception: MILD central chest pain, present only when coughing.)   Negative: Drinking very little and dehydration suspected (e.g., no urine > 12 hours, very dry mouth, very lightheaded)   Negative: Patient sounds very sick or weak to the triager   Negative: SEVERE or constant chest pain or pressure  (Exception: Mild central chest pain, present only when coughing.)   Negative: MODERATE difficulty breathing (e.g., speaks in phrases, SOB even at rest, pulse 100-120)   Negative: Headache and stiff neck (can't touch chin to chest)   Negative: Fever > 100.0 F (37.8 C) and bedridden (e.g., CVA, chronic illness, recovering from surgery)   Negative: Fever > 101 F (38.3 C) and over 60 years of age   Negative: MILD difficulty breathing (e.g., minimal/no SOB at rest, SOB with walking, pulse <100)   Negative: Fever > 103 F (39.4 C)    Protocols used:  Coronavirus (COVID-19) Diagnosed or [id number]

## 2024-04-02 NOTE — PROGRESS NOTES
Medication Therapy Management (MTM) Encounter    ASSESSMENT:                            Medication Adherence/Access: No issues identified    Pruritis: Listed below are some medications concerning for possible cause of itching, however, the patient has been taking these much longer than the itching has been happening, so I suspect low causality.  Will discuss risks/benefits with cardiology if reasonable to change/discontinue.  Bilirubin normal, would not suspect cholestatic pruritus.  Could also consider pregabalin, antidepressant, or naltrexone, possibly infusion options for refractory pruritus (aprepitant).  Recommended try hydrocortisone topical as he has not tried this before.  Would also consider mirtazapine due to increased nocturnal symptoms.  Per Up To Date, may be effective in as short as a few days to a few weeks, informed patient of this.  Medical marijuana and antihistamines have not mitigated symptoms either.     Statins: 16%  Calcium channel blockers: <2%  Balsalazide: post-marketing reports    Balsalazide and ranolazine can cause rash as well, but patient not experiencing rash right now.    Hypertension/PAD/CAD: Stable.    Hyperlipidemia: Stable.    BPH: Stable.    Ulcerative Colitis: Stable.    Insomnia: Stable.    Macular Degeneration: Stable.    Constipation: Stable.    PLAN:                            Try hydrocortisone 1% cream - try applying this to a specific spot 3-4 times daily for about a week to see if beneficial.  Recommend return to dermatologist for more specific recommendations.    Start mirtazapine 7.5 mg every night at bedtime.    Follow-up: Return in 29 days (on 5/2/2024), or if symptoms worsen or fail to improve, for Follow up, with MTM Pharmacist, using a phone visit.    SUBJECTIVE/OBJECTIVE:                          Harrison Arizmendi is a 78 year old male called for an initial visit. He was referred to me from Dr. Kay (cardiology).      Reason for visit: itching.    Allergies/ADRs:  Reviewed in chart  Past Medical History: Reviewed in chart  Tobacco: He reports that he has never smoked. He has never used smokeless tobacco.  Alcohol: 1-3 beverages / week  Caffeine: 1 cup coffee in the AM, 1 diet soda at lunch  Medication Adherence/Access: no issues reported      Pruritis:   Started about 8 years ago, started on his scalp area and has progressively gotten worse, now full body.  No rash, just itching, feeling is constant.  He can suppress this if he keeps busy, but is more of a problem when he goes to bed or is watching TV.  Moved up her from [place] ~ 10 years ago, itching was so gradual that he can't really put a date on it.  Has seen a dermatologist (2020), tried ketoconazole shampoo, betamethasone, clobetasol, fluocinonide, tar shampoo, gabapentin (didn't like taking this), Sarna anti-itch lotion, saw an allergist, but didn't find anything significant.      Takes diphenhydramine 25 mg and loratadine 10 mg daily for seasonal allergies, but have not helped with itching.   Latest Reference Range & Units 05/30/23 10:34   Bilirubin Total <=1.2 mg/dL 0.5       Hypertension /CAD/PAD  Amlodipine 2.5 mg daily  Metoprolol ER 25 mg daily  Ranolazine 500 mg twice daily  ASA 81 mg daily  NTG 0.4 mg as needed  Patient reports no current medication side effects  Patient does not self-monitor blood pressure.       BP Readings from Last 3 Encounters:   03/05/24 103/65   05/30/23 108/67   02/20/23 120/77     Pulse Readings from Last 3 Encounters:   03/05/24 66   05/30/23 56   02/20/23 65       Hyperlipidemia   rosuvastatin 20 mg daily - was likely taking before moving here 10 years ago  16% frequency of pruritus  Used to be on Lipitor long ago per patient  Patient reports no significant myalgias or other side effects.  The 10-year ASCVD risk score (Joslyn ROUSE, et al., 2019) is: 20%    Values used to calculate the score:      Age: 78 years      Sex: Male      Is Non- : No      Diabetic:  "No      Tobacco smoker: No      Systolic Blood Pressure: 103 mmHg      Is BP treated: Yes      HDL Cholesterol: 47 mg/dL      Total Cholesterol: 146 mg/dL  Recent Labs   Lab Test 05/30/23  1034 04/14/22  1459   CHOL 146 156   HDL 47 44   LDL 59 62   TRIG 201* 249*       BPH:   Tamsulosin 0.4 mg daily  Patient reports this has helped with urinary symptoms, no complaints today.    Ulcerative Colitis:   Balsalazide 2250 mg twice daily - started this when it \"first came out\" maybe 20 years ago  No side effects reported today, helps with inflammatory symptoms, bowel movements regular.    Insomnia:   Medical cannabis liquid and spray- 9 mg THC + 16 mg THC per patient report, unsure of strength or quantity on the vial.  Takes 4 mL of liquid, 4 mL spray under the tongue  Helps with sleep and with sore spots in his colon.    Macular Degeneration:   Rhopressa 0.02% 1 drop each eye at bedtime  Timolol 0.5% 1 drop each eye twice daily  Travoprost 0.004% 1 drop each eye daily  No issues today.    Constipation:   Miralax 17 g daily  Psyllium powder 2 tsp daily  No issues today, not having constipation, having regular bowel movements.    Today's Vitals: There were no vitals taken for this visit.  ----------------    I spent 32 minutes with this patient today. All changes were made via verbal approval with Michael Oliver MD. A copy of the visit note was provided to the patient's provider(s).    A summary of these recommendations was sent via [employer].    Mee Rosa PharmD  Pharmacy Resident  Pager #[phone]    Hood Arizmendi was seen independently by Dr. Mee Rosa.  I have reviewed and agree with the resident note and plan of care.      Laila Lee PharmD [facility]  Medication Therapy Management Provider  [phone]      Telemedicine Visit Details  Type of service:  Telephone visit  Start Time:  10:31 AM  End Time: 11:03 AM     Medication Therapy Recommendations  Itching    Current Medication: hydrocortisone (CORTAID) 1 % " external cream   Rationale: Untreated condition - Needs additional medication therapy - Indication   Recommendation: Start Medication - hydrocortisone 1 % external cream   Status: Patient Agreed - Adherence/Education          Current Medication: mirtazapine (REMERON) 7.5 MG tablet   Rationale: Untreated condition - Needs additional medication therapy - Indication   Recommendation: Start Medication - mirtazapine 7.5 MG tablet   Status: Accepted per Provider

## 2024-04-03 ENCOUNTER — VIRTUAL VISIT (OUTPATIENT)
Dept: PHARMACY | Facility: CLINIC | Age: 79
End: 2024-04-03
Attending: INTERNAL MEDICINE
Payer: COMMERCIAL

## 2024-04-03 DIAGNOSIS — L29.9 ITCHING: Primary | ICD-10-CM

## 2024-04-03 DIAGNOSIS — I10 ESSENTIAL HYPERTENSION: ICD-10-CM

## 2024-04-03 DIAGNOSIS — N40.0 BENIGN PROSTATIC HYPERPLASIA, UNSPECIFIED WHETHER LOWER URINARY TRACT SYMPTOMS PRESENT: ICD-10-CM

## 2024-04-03 DIAGNOSIS — G47.00 INSOMNIA, UNSPECIFIED TYPE: ICD-10-CM

## 2024-04-03 DIAGNOSIS — H35.3230 BILATERAL EXUDATIVE AGE-RELATED MACULAR DEGENERATION, UNSPECIFIED STAGE (H): ICD-10-CM

## 2024-04-03 DIAGNOSIS — K51.90 ULCERATIVE COLITIS WITHOUT COMPLICATIONS, UNSPECIFIED LOCATION (H): ICD-10-CM

## 2024-04-03 DIAGNOSIS — K59.00 CONSTIPATION, UNSPECIFIED CONSTIPATION TYPE: ICD-10-CM

## 2024-04-03 DIAGNOSIS — E78.5 HYPERLIPIDEMIA WITH TARGET LDL LESS THAN 100: ICD-10-CM

## 2024-04-03 DIAGNOSIS — I25.10 CORONARY ARTERY DISEASE INVOLVING NATIVE CORONARY ARTERY OF NATIVE HEART WITHOUT ANGINA PECTORIS: ICD-10-CM

## 2024-04-03 DIAGNOSIS — I73.9 PAD (PERIPHERAL ARTERY DISEASE) (H): ICD-10-CM

## 2024-04-03 PROCEDURE — 99607 MTMS BY PHARM ADDL 15 MIN: CPT | Mod: 93

## 2024-04-03 PROCEDURE — 99605 MTMS BY PHARM NP 15 MIN: CPT | Mod: 93

## 2024-04-03 RX ORDER — MIRTAZAPINE 7.5 MG/1
7.5 TABLET, FILM COATED ORAL AT BEDTIME
Qty: 30 TABLET | Refills: 1 | Status: SHIPPED | OUTPATIENT
Start: 2024-04-03 | End: 2024-06-05

## 2024-04-03 RX ORDER — DIPHENHYDRAMINE HCL 25 MG
25 TABLET ORAL DAILY
COMMUNITY
End: 2024-08-06

## 2024-04-03 RX ORDER — LORATADINE 10 MG/1
10 TABLET ORAL DAILY
COMMUNITY
End: 2024-06-05

## 2024-04-03 RX ORDER — BENZOCAINE/MENTHOL 6 MG-10 MG
LOZENGE MUCOUS MEMBRANE 4 TIMES DAILY PRN
COMMUNITY
End: 2024-05-02

## 2024-04-03 NOTE — LETTER
"Recommended To-Do List      Prepared on: 04/03/2024       You can get the best results from your medications by completing the items on this \"To-Do List.\"      Bring your To-Do List when you go to your doctor. And, share it with your family or caregivers.    My To-Do List:  What we talked about: What I should do:   A new medication that may be of benefit to you    Start taking hydrocortisone (CORTAID)          What we talked about: What I should do:   A new medication that may be of benefit to you    Start taking mirtazapine (REMERON)          What we talked about: What I should do:                     "

## 2024-04-03 NOTE — LETTER
_  Medication List        Prepared on: 04/03/2024     Bring your Medication List when you go to the doctor, hospital, or   emergency room. And, share it with your family or caregivers.     Note any changes to how you take your medications.  Cross out medications when you no longer use them.    Medication How I take it Why I use it Prescriber   amLODIPine (NORVASC) 2.5 MG tablet TAKE 1 TABLET DAILY Coronary artery disease involving native coronary artery of native heart without angina pectoris Michael Oliver MD   aspirin 81 MG tablet Take 81 mg by mouth 2 times daily OTC  Secondary prevention Patient Reported   balsalazide (COLAZAL) 750 MG capsule Take 2,250 mg by mouth 2 times daily Take 2 tablets twice daily  Ulcerative colitis Michael Oliver MD   diphenhydrAMINE (BENADRYL ALLERGY) 25 MG tablet Take 25 mg by mouth daily  Pruritus  Patient Reported   hydrocortisone (CORTAID) 1 % external cream Apply topically 4 times daily as needed for itching  Pruritus Patient Reported   loratadine (CLARITIN) 10 MG tablet Take 10 mg by mouth daily  Pruritus Patient Reported   medical cannabis liquid (This is NOT a prescription, and does not certify that the patient has a qualifying medical condition for medical cannabis.  The purpose of this order is  to document that the patient reports taking medical cannabis.)  Insomnia Patient Reported   metoprolol succinate ER (TOPROL XL) 25 MG 24 hr tablet Take 1 tablet (25 mg) by mouth daily Coronary artery disease involving native coronary artery of native heart without angina pectoris Charlotte Kay MD   mirtazapine (REMERON) 7.5 MG tablet Take 1 tablet (7.5 mg) by mouth at bedtime Itching Michael Oliver MD   netarsudil (RHOPRESSA) 0.02 % ophthalmic solution Place 1 drop into both eyes at bedtime  MONTY Oliver MD   nitroGLYcerin (NITROSTAT) 0.4 MG sublingual tablet For chest pain place 1 tablet under the tongue every 5 minutes for 3 doses. If symptoms persist 5 minutes after 1st  dose call 911. Coronary artery disease involving native coronary artery of native heart without angina pectoris Charlotte Kay MD   polyethylene glycol (MIRALAX) 17 GM/Dose powder Take 17 g by mouth daily  Constipation  Michael Oliver MD   psyllium 63 % POWD Take 2 teaspoonful by mouth daily Mix in 8 ounces of water  Constipation  Michael Oliver MD   ranolazine (RANEXA) 500 MG 12 hr tablet Take 500 mg by mouth every 12 hours Stable Angina Pectoris Michael Oliver MD   rosuvastatin (CRESTOR) 20 MG tablet TAKE 1 TABLET DAILY Coronary artery disease involving native coronary artery of native heart without angina pectoris Michael Oliver MD   tamsulosin (FLOMAX) 0.4 MG capsule Take 1 capsule (0.4 mg) by mouth daily Benign prostatic hyperplasia with lower urinary tract symptoms, symptom details unspecified Michael Oliver MD   timolol hemihydrate (BETIMOL) 0.5 % ophthalmic solution Place 1 drop into both eyes 2 times daily  AMD Michael Oliver MD   travoprost FITO FREE (TRAVATAN Z) 0.004 % ophthalmic solution Place 1 drop into both eyes daily  AMD Michael Oliver MD         Add new medications, over-the-counter drugs, herbals, vitamins, or  minerals in the blank rows below.    Medication How I take it Why I use it Prescriber                                      Allergies:      sulfa antibiotics        Side effects I have had:               Other Information:              My notes and questions:

## 2024-04-03 NOTE — PATIENT INSTRUCTIONS
"Recommendations from today's MTM visit:                                                    MTM (medication therapy management) is a service provided by a clinical pharmacist designed to help you get the most of out of your medicines.   Today we reviewed what your medicines are for, how to know if they are working, that your medicines are safe and how to make your medicine regimen as easy as possible.      Try hydrocortisone 1% cream - try applying this to a specific spot 3-4 times daily for about a week to see if beneficial.  Recommend return to dermatologist for more specific recommendations.    Start mirtazapine 7.5 mg every night at bedtime.    Follow-up: Return in about 1 year (around 4/3/2025), or if symptoms worsen or fail to improve.    It was great speaking with you today.  I value your experience and would be very thankful for your time in providing feedback in our clinic survey. In the next few days, you may receive an email or text message from [employer] with a link to a survey related to your  clinical pharmacist.\"     To schedule another MTM appointment, please call the clinic directly or you may call the MTM scheduling line at [phone] or toll-free at [phone].     My Clinical Pharmacist's contact information:                                                      Please feel free to contact me with any questions or concerns you have.      Mee Rosa, PharmD  Pharmacy Resident  Pager #[phone]     "

## 2024-04-03 NOTE — LETTER
April 3, 2024  Hood Arizmendi  [address] [address]    Dear  REGINALDO Arizmendi [facility]     Thank you for talking with me on Apr 3, 2024 about your health and medications. As a follow-up to our conversation, I have included two documents:      Your Recommended To-Do List has steps you should take to get the best results from your medications.  Your Medication List will help you keep track of your medications and how to take them.    If you want to talk about these documents, please call Mee Rosa RPH at phone: [phone], Monday-Friday 8-4:30pm.    I look forward to working with you and your doctors to make sure your medications work well for you.    Sincerely,  Mee Rosa RPH  [facility] Pharmacist, [facility]

## 2024-05-02 ENCOUNTER — VIRTUAL VISIT (OUTPATIENT)
Dept: PHARMACY | Facility: CLINIC | Age: 79
End: 2024-05-02
Payer: COMMERCIAL

## 2024-05-02 DIAGNOSIS — G47.00 INSOMNIA, UNSPECIFIED TYPE: ICD-10-CM

## 2024-05-02 DIAGNOSIS — I10 ESSENTIAL HYPERTENSION: ICD-10-CM

## 2024-05-02 DIAGNOSIS — N40.0 BENIGN PROSTATIC HYPERPLASIA, UNSPECIFIED WHETHER LOWER URINARY TRACT SYMPTOMS PRESENT: ICD-10-CM

## 2024-05-02 DIAGNOSIS — L29.9 ITCHING: Primary | ICD-10-CM

## 2024-05-02 DIAGNOSIS — E78.5 HYPERLIPIDEMIA WITH TARGET LDL LESS THAN 100: ICD-10-CM

## 2024-05-02 DIAGNOSIS — I25.10 CORONARY ARTERY DISEASE INVOLVING NATIVE CORONARY ARTERY OF NATIVE HEART WITHOUT ANGINA PECTORIS: ICD-10-CM

## 2024-05-02 DIAGNOSIS — I73.9 PAD (PERIPHERAL ARTERY DISEASE) (H): ICD-10-CM

## 2024-05-02 PROCEDURE — 99606 MTMS BY PHARM EST 15 MIN: CPT | Mod: 93

## 2024-05-02 PROCEDURE — 99607 MTMS BY PHARM ADDL 15 MIN: CPT | Mod: 93

## 2024-05-02 NOTE — PROGRESS NOTES
"Medication Therapy Management (MTM) Encounter    ASSESSMENT:                            Medication Adherence/Access: No issues identified    Pruritis: Unimproved.  Discussed medication options with Dr. Oliver, but prefers to do further workup at upcoming PCP visit.  Patient does not have relief on mirtazapine, but has been beneficial for sleep.  Could consider increasing mirtazapine for possible benefit (15 mg max dose for pruritus), however with increased dose, may lose some efficacy for sleep.  Additional antidepressants for pruritus are paroxetine and fluvoxamine, sertraline more specific for cholestatic pruritus and would need further workup.  If pruritus deemed cholestatic or CKD-related, could also consider naltrexone.  If neuropathic pruritus, consider pregabalin.    Hypertension/PAD/CAD: Stable.    Hyperlipidemia: Stable.    BPH: Recommend move tamsulosin to bedtime for improved efficacy.  Consider increase tamsulosin dose next visit if still experiencing symptoms.    Insomnia: Improved with addition of mirtazapine.  Continue current dose.    PLAN:                            BPH:   Move tamsulosin at bedtime.  Consider increase dose to 0.8 mg at bedtime next visit    Itching:   Consider further workup and alternative therapy next visit. Dr. Oliver to follow-up in June.    Follow-up: Return in about 2 months (around 7/9/2024) for Follow up, with MTM Pharmacist, using a phone visit.    SUBJECTIVE/OBJECTIVE:                          Harrison Arizmendi is a 79 year old male called for a follow-up visit.      Reason for visit: itching.    Allergies/ADRs: Reviewed in chart  Past Medical History: Reviewed in chart  Tobacco: He reports that he has never smoked. He has never used smokeless tobacco.  Alcohol: 1-3 beverages / week  Caffeine: 1 cup coffee in the AM, 1 diet soda at lunch  Medication Adherence/Access: no issues reported      Pruritis:   Mirtazapine 7.5 mg at bedtime - ineffective for itching, but having \"the best " "sleep of his life\"    Per patient report in April 2024:   Itching started about 8 years ago, started on his scalp area and has progressively gotten worse, now full body.  No rash, just itching, feeling is constant.  He can suppress this if he keeps busy, but is more of a problem when he goes to bed or is watching TV.  Moved up her from [place] ~ 10 years ago, itching was so gradual that he can't really put a date on it.  Has seen a dermatologist (2020), tried ketoconazole shampoo, betamethasone, clobetasol, fluocinonide, tar shampoo, gabapentin (didn't like taking this), Sarna anti-itch lotion, saw an allergist, but didn't find anything significant.      Takes diphenhydramine 25 mg and loratadine 10 mg daily for seasonal allergies, but have not helped with itching.   Latest Reference Range & Units 05/30/23 10:34   Bilirubin Total <=1.2 mg/dL 0.5       Hypertension /CAD/PAD  Amlodipine 2.5 mg daily  Metoprolol ER 25 mg daily  Ranolazine 500 mg twice daily  ASA 81 mg daily  NTG 0.4 mg as needed  Patient reports no current medication side effects  Patient does not self-monitor blood pressure.       BP Readings from Last 3 Encounters:   03/05/24 103/65   05/30/23 108/67   02/20/23 120/77     Pulse Readings from Last 3 Encounters:   03/05/24 66   05/30/23 56   02/20/23 65       Hyperlipidemia   rosuvastatin 20 mg daily - was likely taking before moving here 10 years ago  16% frequency of pruritus  Used to be on Lipitor long ago per patient  Patient reports no significant myalgias or other side effects.  The 10-year ASCVD risk score (Joslyn DK, et al., 2019) is: 21.4%    Values used to calculate the score:      Age: 79 years      Sex: Male      Is Non- : No      Diabetic: No      Tobacco smoker: No      Systolic Blood Pressure: 103 mmHg      Is BP treated: Yes      HDL Cholesterol: 47 mg/dL      Total Cholesterol: 146 mg/dL  Recent Labs   Lab Test 05/30/23  1034 04/14/22  1459   CHOL 146 156 " "  HDL 47 44   LDL 59 62   TRIG 201* 249*       BPH:   Tamsulosin 0.4 mg daily in the morning  Patient reports this has helped with urinary symptoms, but still wakes up with a very full bladder.      Insomnia:   Mirtazapine 7.5 mg at bedtime  Medical cannabis liquid and spray- 9 mg THC + 16 mg THC per patient report, unsure of strength or quantity on the vial.  Takes 4 mL of liquid, 4 mL spray under the tongue  Helps with sleep and with sore spots in his colon. Notes he was getting only a maximum of 5 hours of sleep per night before, and wears a CPAP.  With mirtazapine he is sleeping through the night now, reports \"best sleep of his life.\"       Today's Vitals: There were no vitals taken for this visit.  ----------------    I spent 17 minutes with this patient today. All changes were made via verbal approval with Michael Oliver MD. A copy of the visit note was provided to the patient's provider(s).    A summary of these recommendations was sent via [employer].    Mee Rosa PharmD  Pharmacy Resident  Pager #[phone]    Hood Arizmendi was seen independently by Dr. Mee Rosa.  I have reviewed and agree with the resident note and plan of care.      Laila Lee PharmD [facility]  Medication Therapy Management Provider  [phone]      Telemedicine Visit Details  Type of service:  Telephone visit  Start Time:  10:31 AM  End Time:  10:48 AM     Medication Therapy Recommendations  Benign prostatic hyperplasia, unspecified whether lower urinary tract symptoms present    Current Medication: tamsulosin (FLOMAX) 0.4 MG capsule   Rationale: Does not understand instructions - Adherence - Adherence   Recommendation: Change Administration Time - tamsulosin 0.4 MG capsule   Status: Accepted per Provider            "

## 2024-05-02 NOTE — PATIENT INSTRUCTIONS
"Recommendations from today's MTM visit:                                                      Move tamsulosin to evening/bedtime.  Dr. Oliver would prefer to do further workup of itching at upcoming visit together in June.    Follow-up: Return in about 2 months (around 7/9/2024) for Follow up, with MTM Pharmacist, using a phone visit.    It was great speaking with you today.  I value your experience and would be very thankful for your time in providing feedback in our clinic survey. In the next few days, you may receive an email or text message from [employer] with a link to a survey related to your  clinical pharmacist.\"     To schedule another MTM appointment, please call the clinic directly or you may call the MTM scheduling line at [phone] or toll-free at [phone].     My Clinical Pharmacist's contact information:                                                      Please feel free to contact me with any questions or concerns you have.      Mee Rosa, PharmD  Pharmacy Resident  Pager #[phone]   "

## 2024-05-07 ENCOUNTER — DOCUMENTATION ONLY (OUTPATIENT)
Dept: SLEEP MEDICINE | Facility: CLINIC | Age: 79
End: 2024-05-07
Payer: COMMERCIAL

## 2024-05-07 DIAGNOSIS — G47.33 OBSTRUCTIVE SLEEP APNEA (ADULT) (PEDIATRIC): Primary | ICD-10-CM

## 2024-06-03 SDOH — HEALTH STABILITY: PHYSICAL HEALTH: ON AVERAGE, HOW MANY MINUTES DO YOU ENGAGE IN EXERCISE AT THIS LEVEL?: 20 MIN

## 2024-06-03 SDOH — HEALTH STABILITY: PHYSICAL HEALTH: ON AVERAGE, HOW MANY DAYS PER WEEK DO YOU ENGAGE IN MODERATE TO STRENUOUS EXERCISE (LIKE A BRISK WALK)?: 3 DAYS

## 2024-06-03 ASSESSMENT — SOCIAL DETERMINANTS OF HEALTH (SDOH): HOW OFTEN DO YOU GET TOGETHER WITH FRIENDS OR RELATIVES?: ONCE A WEEK

## 2024-06-05 ENCOUNTER — OFFICE VISIT (OUTPATIENT)
Dept: PEDIATRICS | Facility: CLINIC | Age: 79
End: 2024-06-05
Attending: INTERNAL MEDICINE
Payer: COMMERCIAL

## 2024-06-05 VITALS
BODY MASS INDEX: 32.37 KG/M2 | TEMPERATURE: 98 F | HEART RATE: 64 BPM | OXYGEN SATURATION: 96 % | SYSTOLIC BLOOD PRESSURE: 123 MMHG | WEIGHT: 244.2 LBS | RESPIRATION RATE: 16 BRPM | DIASTOLIC BLOOD PRESSURE: 76 MMHG | HEIGHT: 73 IN

## 2024-06-05 DIAGNOSIS — G47.33 OSA (OBSTRUCTIVE SLEEP APNEA): ICD-10-CM

## 2024-06-05 DIAGNOSIS — R53.83 OTHER FATIGUE: ICD-10-CM

## 2024-06-05 DIAGNOSIS — K51.90 ULCERATIVE COLITIS WITHOUT COMPLICATIONS, UNSPECIFIED LOCATION (H): ICD-10-CM

## 2024-06-05 DIAGNOSIS — L29.9 ITCHING: ICD-10-CM

## 2024-06-05 DIAGNOSIS — I10 ESSENTIAL HYPERTENSION: ICD-10-CM

## 2024-06-05 DIAGNOSIS — C61 PROSTATE CANCER (H): ICD-10-CM

## 2024-06-05 DIAGNOSIS — R73.01 IFG (IMPAIRED FASTING GLUCOSE): ICD-10-CM

## 2024-06-05 DIAGNOSIS — E78.5 HYPERLIPIDEMIA WITH TARGET LDL LESS THAN 100: ICD-10-CM

## 2024-06-05 DIAGNOSIS — Z00.00 ENCOUNTER FOR MEDICARE ANNUAL WELLNESS EXAM: Primary | ICD-10-CM

## 2024-06-05 DIAGNOSIS — G47.00 INSOMNIA, UNSPECIFIED TYPE: ICD-10-CM

## 2024-06-05 DIAGNOSIS — E03.9 HYPOTHYROIDISM, UNSPECIFIED TYPE: ICD-10-CM

## 2024-06-05 DIAGNOSIS — N20.0 KIDNEY STONE: ICD-10-CM

## 2024-06-05 DIAGNOSIS — I25.10 CORONARY ARTERY DISEASE INVOLVING NATIVE CORONARY ARTERY OF NATIVE HEART WITHOUT ANGINA PECTORIS: ICD-10-CM

## 2024-06-05 DIAGNOSIS — N40.1 BENIGN PROSTATIC HYPERPLASIA WITH LOWER URINARY TRACT SYMPTOMS, SYMPTOM DETAILS UNSPECIFIED: ICD-10-CM

## 2024-06-05 DIAGNOSIS — E29.1 MALE HYPOGONADISM: ICD-10-CM

## 2024-06-05 LAB
ALBUMIN UR-MCNC: NEGATIVE MG/DL
APPEARANCE UR: CLEAR
BASOPHILS # BLD AUTO: 0 10E3/UL (ref 0–0.2)
BASOPHILS NFR BLD AUTO: 1 %
BILIRUB UR QL STRIP: NEGATIVE
COLOR UR AUTO: YELLOW
EOSINOPHIL # BLD AUTO: 0.1 10E3/UL (ref 0–0.7)
EOSINOPHIL NFR BLD AUTO: 1 %
ERYTHROCYTE [DISTWIDTH] IN BLOOD BY AUTOMATED COUNT: 14.2 % (ref 10–15)
GLUCOSE UR STRIP-MCNC: NEGATIVE MG/DL
HBA1C MFR BLD: 5.8 % (ref 0–5.6)
HCT VFR BLD AUTO: 43 % (ref 40–53)
HGB BLD-MCNC: 14.2 G/DL (ref 13.3–17.7)
HGB UR QL STRIP: NEGATIVE
IMM GRANULOCYTES # BLD: 0 10E3/UL
IMM GRANULOCYTES NFR BLD: 0 %
KETONES UR STRIP-MCNC: NEGATIVE MG/DL
LEUKOCYTE ESTERASE UR QL STRIP: NEGATIVE
LYMPHOCYTES # BLD AUTO: 1.5 10E3/UL (ref 0.8–5.3)
LYMPHOCYTES NFR BLD AUTO: 25 %
MCH RBC QN AUTO: 30.1 PG (ref 26.5–33)
MCHC RBC AUTO-ENTMCNC: 33 G/DL (ref 31.5–36.5)
MCV RBC AUTO: 91 FL (ref 78–100)
MONOCYTES # BLD AUTO: 0.6 10E3/UL (ref 0–1.3)
MONOCYTES NFR BLD AUTO: 10 %
NEUTROPHILS # BLD AUTO: 3.8 10E3/UL (ref 1.6–8.3)
NEUTROPHILS NFR BLD AUTO: 63 %
NITRATE UR QL: NEGATIVE
PH UR STRIP: 5.5 [PH] (ref 5–7)
PLATELET # BLD AUTO: 232 10E3/UL (ref 150–450)
RBC # BLD AUTO: 4.72 10E6/UL (ref 4.4–5.9)
SP GR UR STRIP: 1.02 (ref 1–1.03)
UROBILINOGEN UR STRIP-ACNC: 0.2 E.U./DL
WBC # BLD AUTO: 6.1 10E3/UL (ref 4–11)

## 2024-06-05 PROCEDURE — 84403 ASSAY OF TOTAL TESTOSTERONE: CPT | Performed by: INTERNAL MEDICINE

## 2024-06-05 PROCEDURE — 82550 ASSAY OF CK (CPK): CPT | Performed by: INTERNAL MEDICINE

## 2024-06-05 PROCEDURE — 84443 ASSAY THYROID STIM HORMONE: CPT | Performed by: INTERNAL MEDICINE

## 2024-06-05 PROCEDURE — 36415 COLL VENOUS BLD VENIPUNCTURE: CPT | Performed by: INTERNAL MEDICINE

## 2024-06-05 PROCEDURE — G0439 PPPS, SUBSEQ VISIT: HCPCS | Performed by: INTERNAL MEDICINE

## 2024-06-05 PROCEDURE — 83036 HEMOGLOBIN GLYCOSYLATED A1C: CPT | Performed by: INTERNAL MEDICINE

## 2024-06-05 PROCEDURE — 99214 OFFICE O/P EST MOD 30 MIN: CPT | Mod: 25 | Performed by: INTERNAL MEDICINE

## 2024-06-05 PROCEDURE — 80053 COMPREHEN METABOLIC PANEL: CPT | Performed by: INTERNAL MEDICINE

## 2024-06-05 PROCEDURE — 84153 ASSAY OF PSA TOTAL: CPT | Performed by: INTERNAL MEDICINE

## 2024-06-05 PROCEDURE — 81003 URINALYSIS AUTO W/O SCOPE: CPT | Performed by: INTERNAL MEDICINE

## 2024-06-05 PROCEDURE — 85025 COMPLETE CBC W/AUTO DIFF WBC: CPT | Performed by: INTERNAL MEDICINE

## 2024-06-05 RX ORDER — ROSUVASTATIN CALCIUM 20 MG/1
20 TABLET, COATED ORAL DAILY
Qty: 90 TABLET | Refills: 3 | Status: SHIPPED | OUTPATIENT
Start: 2024-06-05

## 2024-06-05 RX ORDER — SERTRALINE HYDROCHLORIDE 25 MG/1
25 TABLET, FILM COATED ORAL DAILY
Qty: 30 TABLET | Refills: 2 | Status: SHIPPED | OUTPATIENT
Start: 2024-06-05 | End: 2024-07-10

## 2024-06-05 RX ORDER — TAMSULOSIN HYDROCHLORIDE 0.4 MG/1
0.4 CAPSULE ORAL DAILY
Qty: 90 CAPSULE | Refills: 3 | Status: SHIPPED | OUTPATIENT
Start: 2024-06-05

## 2024-06-05 RX ORDER — MIRTAZAPINE 7.5 MG/1
7.5 TABLET, FILM COATED ORAL AT BEDTIME
Qty: 30 TABLET | Refills: 1 | Status: SHIPPED | OUTPATIENT
Start: 2024-06-05 | End: 2024-08-06

## 2024-06-05 RX ORDER — AMLODIPINE BESYLATE 2.5 MG/1
2.5 TABLET ORAL DAILY
Qty: 90 TABLET | Refills: 3 | Status: SHIPPED | OUTPATIENT
Start: 2024-06-05

## 2024-06-05 RX ORDER — HYDROCODONE BITARTRATE AND ACETAMINOPHEN 5; 325 MG/1; MG/1
1 TABLET ORAL EVERY 6 HOURS PRN
Qty: 18 TABLET | Refills: 0 | Status: SHIPPED | OUTPATIENT
Start: 2024-06-05 | End: 2024-06-08

## 2024-06-05 ASSESSMENT — PAIN SCALES - GENERAL: PAINLEVEL: NO PAIN (0)

## 2024-06-05 NOTE — PATIENT INSTRUCTIONS
"No further colonoscopy.  Labs today.  We can begin a low dose of sertraline to see if this helps your itching.  May also take as needed mirtazepine at night.   Refills given today.  We can consider additional meds like ozempic at your follow up.       Preventive Care Advice   This is general advice we often give to help people stay healthy. Your care team may have specific advice just for you. Please talk to your care team about your own preventive care needs.  Lifestyle  Exercise at least 150 minutes each week (30 minutes a day, 5 days a week).  Do muscle strengthening activities 2 days a week. These help control your weight and prevent disease.  No smoking.  Wear sunscreen to prevent skin cancer.  Have your home tested for radon every 2 to 5 years. Radon is a colorless, odorless gas that can harm your lungs. To learn more, go to www.health.state.[place] and search for \"Radon in Homes.\"  Keep guns unloaded and locked up in a safe place like a safe or gun vault, or, use a gun lock and hide the keys. Always lock away bullets separately. To learn more, visit [place] and search for \"safe gun storage.\"  Nutrition  Eat 5 or more servings of fruits and vegetables each day.  Try wheat bread, brown rice and whole grain pasta (instead of white bread, rice, and pasta).  Get enough calcium and vitamin D. Check the label on foods and aim for 100% of the RDA (recommended daily allowance).  Regular exams  Have a dental exam and cleaning every 6 months.  See your health care team every year to talk about:  Any changes in your health.  Any medicines your care team has prescribed.  Preventive care, family planning, and ways to prevent chronic diseases.  Shots (vaccines)   HPV shots (up to age 26), if you've never had them before.  Hepatitis B shots (up to age 59), if you've never had them before.  COVID-19 shot: Get this shot when it's due.  Flu shot: Get a flu shot every year.  Tetanus shot: Get a tetanus shot every 10 " years.  Pneumococcal, hepatitis A, and RSV shots: Ask your care team if you need these based on your risk.  Shingles shot (for age 50 and up).  General health tests  Diabetes screening:  Starting at age 35, Get screened for diabetes at least every 3 years.  If you are younger than age 35, ask your care team if you should be screened for diabetes.  Cholesterol test: At age 39, start having a cholesterol test every 5 years, or more often if advised.  Bone density scan (DEXA): At age 50, ask your care team if you should have this scan for osteoporosis (brittle bones).  Hepatitis C: Get tested at least once in your life.  Abdominal aortic aneurysm screening: Talk to your doctor about having this screening if you:  Have ever smoked; and  Are biologically male; and  Are between the ages of 65 and 75.  STIs (sexually transmitted infections)  Before age 24: Ask your care team if you should be screened for STIs.  After age 24: Get screened for STIs if you're at risk. You are at risk for STIs (including HIV) if:  You are sexually active with more than one person.  You don't use condoms every time.  You or a partner was diagnosed with a sexually transmitted infection.  If you are at risk for HIV, ask about PrEP medicine to prevent HIV.  Get tested for HIV at least once in your life, whether you are at risk for HIV or not.  Cancer screening tests  Cervical cancer screening: If you have a cervix, begin getting regular cervical cancer screening tests at age 21. Most people who have regular screenings with normal results can stop after age 65. Talk about this with your provider.  Breast cancer scan (mammogram): If you've ever had breasts, begin having regular mammograms starting at age 40. This is a scan to check for breast cancer.  Colon cancer screening: It is important to start screening for colon cancer at age 45.  Have a colonoscopy test every 10 years (or more often if you're at risk) Or, ask your provider about stool tests  like a FIT test every year or Cologuard test every 3 years.  To learn more about your testing options, visit: [email or web address]/637446.pdf.  For help making a decision, visit: [email or web address].  Prostate cancer screening test: If you have a prostate and are age 55 to 69, ask your provider if you would benefit from a yearly prostate cancer screening test.  Lung cancer screening: If you are a current or former smoker age 50 to 80, ask your care team if ongoing lung cancer screenings are right for you.  For informational purposes only. Not to replace the advice of your health care provider. Copyright   2023 [facility]. All rights reserved. Clinically reviewed by the [facility]. [employer] 914446 - REV 04/24.    Learning About Activities of Daily Living  What are activities of daily living?     Activities of daily living (ADLs) are the basic self-care tasks you do every day. These include eating, bathing, dressing, and moving around.  As you age, and if you have health problems, you may find that it's harder to do some of these tasks. If so, your doctor can suggest ideas that may help.  To measure what kind of help you may need, your doctor will ask how well you are able to do ADLs. Let your doctor know if there are any tasks that you are having trouble doing. This is an important first step to getting help. And when you have the help you need, you can stay as independent as possible.  How will a doctor assess your ADLs?  Asking about ADLs is part of a routine health checkup your doctor will likely do as you age. Your health check might be done in a doctor's office, in your home, or at a hospital. The goal is to find out if you are having any problems that could make it hard to care for yourself or that make it unsafe for you to be on your own.  To measure your ADLs, your doctor will ask how hard it is for you to do routine tasks. Your doctor may also want to know if you have changed  the way you do a task because of a health problem. Your doctor may watch how you:  Walk back and forth.  Keep your balance while you stand or walk.  Move from sitting to standing or from a bed to a chair.  Button or unbutton a shirt or sweater.  Remove and put on your shoes.  It's common to feel a little worried or anxious if you find you can't do all the things you used to be able to do. Talking with your doctor about ADLs is a way to make sure you're as safe as possible and able to care for yourself as well as you can. You may want to bring a caregiver, friend, or family member to your checkup. They can help you talk to your doctor.  Follow-up care is a key part of your treatment and safety. Be sure to make and go to all appointments, and call your doctor if you are having problems. It's also a good idea to know your test results and keep a list of the medicines you take.  Current as of: October 24, 2023               Content Version: 14.0    3875-9888 [employer].   Care instructions adapted under license by your healthcare professional. If you have questions about a medical condition or this instruction, always ask your healthcare professional. [employer] disclaims any warranty or liability for your use of this information.      Preventing Falls: Care Instructions  Injuries and health problems such as trouble walking or poor eyesight can increase your risk of falling. So can some medicines. But there are things you can do to help prevent falls. You can exercise to get stronger. You can also arrange your home to make it safer.    Talk to your doctor about the medicines you take. Ask if any of them increase the risk of falls and whether they can be changed or stopped.   Try to exercise regularly. It can help improve your strength and balance. This can help lower your risk of falling.     Practice fall safety and prevention.    Wear low-heeled shoes that fit well and give your feet good  "support. Talk to your doctor if you have foot problems that make this hard.  Carry a cellphone or wear a medical alert device that you can use to call for help.  Use stepladders instead of chairs to reach high objects. Don't climb if you're at risk for falls. Ask for help, if needed.  Wear the correct eyeglasses, if you need them.    Make your home safer.    Remove rugs, cords, clutter, and furniture from walkways.  Keep your house well lit. Use night-lights in hallways and bathrooms.  Install and use sturdy handrails on stairways.  Wear nonskid footwear, even inside. Don't walk barefoot or in socks without shoes.    Be safe outside.    Use handrails, curb cuts, and ramps whenever possible.  Keep your hands free by using a shoulder bag or backpack.  Try to walk in well-lit areas. Watch out for uneven ground, changes in pavement, and debris.  Be careful in the winter. Walk on the grass or gravel when sidewalks are slippery. Use de-icer on steps and walkways. Add non-slip devices to shoes.    Put grab bars and nonskid mats in your shower or tub and near the toilet. Try to use a shower chair or bath bench when bathing.   Get into a tub or shower by putting in your weaker leg first. Get out with your strong side first. Have a phone or medical alert device in the bathroom with you.   Where can you learn more?  Go to https://www.[employer].net/patiented  Enter G117 in the search box to learn more about \"Preventing Falls: Care Instructions.\"  Current as of: July 17, 2023               Content Version: 14.0    2894-2600 [employer].   Care instructions adapted under license by your healthcare professional. If you have questions about a medical condition or this instruction, always ask your healthcare professional. [employer] disclaims any warranty or liability for your use of this information.      Hearing Loss: Care Instructions  Overview     Hearing loss is a sudden or slow decrease in how well " you hear. It can range from slight to profound. Permanent hearing loss can occur with aging. It also can happen when you are exposed long-term to loud noise. Examples include listening to loud music, riding motorcycles, or being around other loud machines.  Hearing loss can affect your work and home life. It can make you feel lonely or depressed. You may feel that you have lost your independence. But hearing aids and other devices can help you hear better and feel connected to others.  Follow-up care is a key part of your treatment and safety. Be sure to make and go to all appointments, and call your doctor if you are having problems. It's also a good idea to know your test results and keep a list of the medicines you take.  How can you care for yourself at home?  Avoid loud noises whenever possible. This helps keep your hearing from getting worse.  Always wear hearing protection around loud noises.  Wear a hearing aid as directed.  A professional can help you pick a hearing aid that will work best for you.  You can also get hearing aids over the counter for mild to moderate hearing loss.  Have hearing tests as your doctor suggests. They can show whether your hearing has changed. Your hearing aid may need to be adjusted.  Use other devices as needed. These may include:  Telephone amplifiers and hearing aids that can connect to a television, stereo, radio, or microphone.  Devices that use lights or vibrations. These alert you to the doorbell, a ringing telephone, or a baby monitor.  Television closed-captioning. This shows the words at the bottom of the screen. Most new TVs can do this.  TTY (text telephone). This lets you type messages back and forth on the telephone instead of talking or listening. These devices are also called TDD. When messages are typed on the keyboard, they are sent over the phone line to a receiving TTY. The message is shown on a monitor.  Use text messaging, social media, and email if it is  "hard for you to communicate by telephone.  Try to learn a listening technique called speechreading. It is not lipreading. You pay attention to people's gestures, expressions, posture, and tone of voice. These clues can help you understand what a person is saying. Face the person you are talking to, and have them face you. Make sure the lighting is good. You need to see the other person's face clearly.  Think about counseling if you need help to adjust to your hearing loss.  When should you call for help?  Watch closely for changes in your health, and be sure to contact your doctor if:    You think your hearing is getting worse.     You have new symptoms, such as dizziness or nausea.   Where can you learn more?  Go to https://www.[employer].net/patiented  Enter R798 in the search box to learn more about \"Hearing Loss: Care Instructions.\"  Current as of: September 27, 2023               Content Version: 14.0    6437-5552 [employer].   Care instructions adapted under license by your healthcare professional. If you have questions about a medical condition or this instruction, always ask your healthcare professional. [employer] disclaims any warranty or liability for your use of this information.      Learning About Sleeping Well  What does sleeping well mean?     Sleeping well means getting enough sleep to feel good and stay healthy. How much sleep is enough varies among people.  The number of hours you sleep and how you feel when you wake up are both important. If you do not feel refreshed, you probably need more sleep. Another sign of not getting enough sleep is feeling tired during the day.  Experts recommend that adults get at least 7 or more hours of sleep per day. Children and older adults need more sleep.  Why is getting enough sleep important?  Getting enough quality sleep is a basic part of good health. When your sleep suffers, your physical health, mood, and your thoughts can suffer " "too. You may find yourself feeling more grumpy or stressed. Not getting enough sleep also can lead to serious problems, including injury, accidents, anxiety, and depression.  What might cause poor sleeping?  Many things can cause sleep problems, including:  Changes to your sleep schedule.  Stress. Stress can be caused by fear about a single event, such as giving a speech. Or you may have ongoing stress, such as worry about work or school.  Depression, anxiety, and other mental or emotional conditions.  Changes in your sleep habits or surroundings. This includes changes that happen where you sleep, such as noise, light, or sleeping in a different bed. It also includes changes in your sleep pattern, such as having jet lag or working a late shift.  Health problems, such as pain, breathing problems, and restless legs syndrome.  Lack of regular exercise.  Using alcohol, nicotine, or caffeine before bed.  How can you help yourself?  Here are some tips that may help you sleep more soundly and wake up feeling more refreshed.  Your sleeping area   Use your bedroom only for sleeping and sex. A bit of light reading may help you fall asleep. But if it doesn't, do your reading elsewhere in the house. Try not to use your TV, computer, smartphone, or tablet while you are in bed.  Be sure your bed is big enough to stretch out comfortably, especially if you have a sleep partner.  Keep your bedroom quiet, dark, and cool. Use curtains, blinds, or a sleep mask to block out light. To block out noise, use earplugs, soothing music, or a \"white noise\" machine.  Your evening and bedtime routine   Create a relaxing bedtime routine. You might want to take a warm shower or bath, or listen to soothing music.  Go to bed at the same time every night. And get up at the same time every morning, even if you feel tired.  What to avoid   Limit caffeine (coffee, tea, caffeinated sodas) during the day, and don't have any for at least 6 hours before " "bedtime.  Avoid drinking alcohol before bedtime. Alcohol can cause you to wake up more often during the night.  Try not to smoke or use tobacco, especially in the evening. Nicotine can keep you awake.  Limit naps during the day, especially close to bedtime.  Avoid lying in bed awake for too long. If you can't fall asleep or if you wake up in the middle of the night and can't get back to sleep within about 20 minutes, get out of bed and go to another room until you feel sleepy.  Avoid taking medicine right before bed that may keep you awake or make you feel hyper or energized. Your doctor can tell you if your medicine may do this and if you can take it earlier in the day.  If you can't sleep   Imagine yourself in a peaceful, pleasant scene. Focus on the details and feelings of being in a place that is relaxing.  Get up and do a quiet or boring activity until you feel sleepy.  Avoid drinking any liquids before going to bed to help prevent waking up often to use the bathroom.  Where can you learn more?  Go to https://www.[employer].net/patiented  Enter J942 in the search box to learn more about \"Learning About Sleeping Well.\"  Current as of: July 10, 2023               Content Version: 14.0    2892-1110 [employer].   Care instructions adapted under license by your healthcare professional. If you have questions about a medical condition or this instruction, always ask your healthcare professional. [employer] disclaims any warranty or liability for your use of this information.      Substance Use Disorder: Care Instructions  Overview     You can improve your life and health by stopping your use of alcohol or drugs. When you don't drink or use drugs, you may feel and sleep better. You may get along better with your family, friends, and coworkers. There are medicines and programs that can help with substance use disorder.  How can you care for yourself at home?  Here are some ways to help you " stay sober and prevent relapse.  If you have been given medicine to help keep you sober or reduce your cravings, be sure to take it exactly as prescribed.  Talk to your doctor about programs that can help you stop using drugs or drinking alcohol.  Do not keep alcohol or drugs in your home.  Plan ahead. Think about what you'll say if other people ask you to drink or use drugs. Try not to spend time with people who drink or use drugs.  Use the time and money spent on drinking or drugs to do something that's important to you.  Preventing a relapse  Have a plan to deal with relapse. Learn to recognize changes in your thinking that lead you to drink or use drugs. Get help before you start to drink or use drugs again.  Try to stay away from situations, friends, or places that may lead you to drink or use drugs.  If you feel the need to drink alcohol or use drugs again, seek help right away. Call a trusted friend or family member. Some people get support from organizations such as Narcotics Anonymous or [employer] or from treatment facilities.  If you relapse, get help as soon as you can. Some people make a plan with another person that outlines what they want that person to do for them if they relapse. The plan usually includes how to handle the relapse and who to notify in case of relapse.  Don't give up. Remember that a relapse doesn't mean that you have failed. Use the experience to learn the triggers that lead you to drink or use drugs. Then quit again. Recovery is a lifelong process. Many people have several relapses before they are able to quit for good.  Follow-up care is a key part of your treatment and safety. Be sure to make and go to all appointments, and call your doctor if you are having problems. It's also a good idea to know your test results and keep a list of the medicines you take.  When should you call for help?   Call 911  anytime you think you may need emergency care. For example, call if you or  "someone else:    Has overdosed or has withdrawal signs. Be sure to tell the emergency workers that you are or someone else is using or trying to quit using drugs. Overdose or withdrawal signs may include:  Losing consciousness.  Seizure.  Seeing or hearing things that aren't there (hallucinations).     Is thinking or talking about suicide or harming others.   Where to get help 24 hours a day, 7 days a week   If you or someone you know talks about suicide, self-harm, a mental health crisis, a substance use crisis, or any other kind of emotional distress, get help right away. You can:    Call the Suicide and Crisis Lifeline at 988.     Call 5-463-504-TALK (1-675.494.3411).     Text HOME to 914088 to access the Crisis Text Line.   Consider saving these numbers in your phone.  Go to [employer] for more information or to chat online.  Call your doctor now or seek immediate medical care if:    You are having withdrawal symptoms. These may include nausea or vomiting, sweating, shakiness, and anxiety.   Watch closely for changes in your health, and be sure to contact your doctor if:    You have a relapse.     You need more help or support to stop.   Where can you learn more?  Go to https://www.[employer].net/patiented  Enter H573 in the search box to learn more about \"Substance Use Disorder: Care Instructions.\"  Current as of: November 15, 2023               Content Version: 14.0    2006-5426 [employer].   Care instructions adapted under license by your healthcare professional. If you have questions about a medical condition or this instruction, always ask your healthcare professional. [employer] disclaims any warranty or liability for your use of this information.      Chronic Pain: Care Instructions  Your Care Instructions     Chronic pain is pain that lasts a long time (months or even years) and may or may not have a clear cause. It is different from acute pain, which usually does have a " clear cause--like an injury or illness--and gets better over time. Chronic pain:  Lasts over time but may vary from day to day.  Does not go away despite efforts to end it.  May disrupt your sleep and lead to fatigue.  May cause depression or anxiety.  May make your muscles tense, causing more pain.  Can disrupt your work, hobbies, home life, and relationships with friends and family.  Chronic pain is a very real condition. It is not just in your head. Treatment can help and usually includes several methods used together, such as medicines, physical therapy, exercise, and other treatments. Learning how to relax and changing negative thought patterns can also help you cope.  Chronic pain is complex. Taking an active role in your treatment will help you better manage your pain. Tell your doctor if you have trouble dealing with your pain. You may have to try several things before you find what works best for you.  Follow-up care is a key part of your treatment and safety. Be sure to make and go to all appointments, and call your doctor if you are having problems. It's also a good idea to know your test results and keep a list of the medicines you take.  How can you care for yourself at home?  Pace yourself. Break up large jobs into smaller tasks. Save harder tasks for days when you have less pain, or go back and forth between hard tasks and easier ones. Take rest breaks.  Relax, and reduce stress. Relaxation techniques such as deep breathing or meditation can help.  Keep moving. Gentle, daily exercise can help reduce pain over the long run. Try low- or no-impact exercises such as walking, swimming, and stationary biking. Do stretches to stay flexible.  Try heat, cold packs, and massage.  Get enough sleep. Chronic pain can make you tired and drain your energy. Talk with your doctor if you have trouble sleeping because of pain.  Think positive. Your thoughts can affect your pain level. Do things that you enjoy to  distract yourself when you have pain instead of focusing on the pain. See a movie, read a book, listen to music, or spend time with a friend.  If you think you are depressed, talk to your doctor about treatment.  Keep a daily pain diary. Record how your moods, thoughts, sleep patterns, activities, and medicine affect your pain. You may find that your pain is worse during or after certain activities or when you are feeling a certain emotion. Having a record of your pain can help you and your doctor find the best ways to treat your pain.  Take pain medicines exactly as directed.  If the doctor gave you a prescription medicine for pain, take it as prescribed.  If you are not taking a prescription pain medicine, ask your doctor if you can take an over-the-counter medicine.  Reducing constipation caused by pain medicine  Talk to your doctor about a laxative. If a laxative doesn't work, your doctor may suggest a prescription medicine.  Include fruits, vegetables, beans, and whole grains in your diet each day. These foods are high in fiber.  If your doctor recommends it, get more exercise. Walking is a good choice. Bit by bit, increase the amount you walk every day. Try for at least 30 minutes on most days of the week.  Schedule time each day for a bowel movement. A daily routine may help. Take your time and do not strain when having a bowel movement.  When should you call for help?   Call your doctor now or seek immediate medical care if:    Your pain gets worse or is out of control.     You feel down or blue, or you do not enjoy things like you once did. You may be depressed, which is common in people with chronic pain. Depression can be treated.     You have vomiting or cramps for more than 2 hours.   Watch closely for changes in your health, and be sure to contact your doctor if:    You cannot sleep because of pain.     You are very worried or anxious about your pain.     You have trouble taking your pain medicine.      "You have any concerns about your pain medicine.     You have trouble with bowel movements, such as:  No bowel movement in 3 days.  Blood in the anal area, in your stool, or on the toilet paper.  Diarrhea for more than 24 hours.   Where can you learn more?  Go to [email or web address]  Enter N004 in the search box to learn more about \"Chronic Pain: Care Instructions.\"  Current as of: July 10, 2023               Content Version: 14.0    2741-6240 [employer].   Care instructions adapted under license by your healthcare professional. If you have questions about a medical condition or this instruction, always ask your healthcare professional. [employer] disclaims any warranty or liability for your use of this information.      "

## 2024-06-05 NOTE — PROGRESS NOTES
Preventive Care Visit  [facility] EDWARD Oliver MD, Internal Medicine - Pediatrics  Jun 5, 2024      Assessment & Plan     Prostate cancer (H)  Monitoring prostate specific antigen (PSA).   - PSA, tumor marker; Future  - PSA, tumor marker  - OFFICE/OUTPT VISIT,EST,LEVL IV    DARYL (obstructive sleep apnea)  Ongoing continuous positive airway pressure.   - OFFICE/OUTPT VISIT,EST,LEVL IV    Insomnia, unspecified type  Mirtazepine helping.   - OFFICE/OUTPT VISIT,EST,LEVL IV    Hyperlipidemia with target LDL less than 100  Ongoing statin.   - OFFICE/OUTPT VISIT,EST,LEVL IV    Essential hypertension  At goal.   - OFFICE/OUTPT VISIT,EST,LEVL IV    Coronary artery disease involving native coronary artery of native heart without angina pectoris  secol  - amLODIPine (NORVASC) 2.5 MG tablet; Take 1 tablet (2.5 mg) by mouth daily  - rosuvastatin (CRESTOR) 20 MG tablet; Take 1 tablet (20 mg) by mouth daily  - OFFICE/OUTPT VISIT,EST,LEVL IV    Ulcerative colitis without complications, unspecified location (H)   Stable.    - OFFICE/OUTPT VISIT,EST,LEVL IV    Encounter for Medicare annual wellness exam  Discussed diet, exercise, testicular self exam, blood pressure, cholesterol, and need for cancer surveillance at appropriate ages.     Male hypogonadism  R/o, due to faigue.   - Testosterone, total; Future  - Testosterone, total  - OFFICE/OUTPT VISIT,EST,LEVL IV    Hypothyroidism, unspecified type  R/o, due to fatige.   - TSH with free T4 reflex; Future  - TSH with free T4 reflex    Other fatigue  Uncertain etiology; may be age related.   - Comprehensive metabolic panel (BMP + Alb, Alk Phos, ALT, AST, Total. Bili, TP); Future  - CBC with platelets and differential; Future  - CK total; Future  - Comprehensive metabolic panel (BMP + Alb, Alk Phos, ALT, AST, Total. Bili, TP)  - CBC with platelets and differential  - CK total  - OFFICE/OUTPT VISIT,EST,LEVL IV    IFG (impaired fasting glucose)    - Hemoglobin A1c;  "Future  - Hemoglobin A1c    Itching  Trial of sertraline, as symptoms ongoing during day (but helped by remeron at night).   - mirtazapine (REMERON) 7.5 MG tablet; Take 1 tablet (7.5 mg) by mouth at bedtime  - sertraline (ZOLOFT) 25 MG tablet; Take 1 tablet (25 mg) by mouth daily  - OFFICE/OUTPT VISIT,EST,LEVL IV    Benign prostatic hyperplasia with lower urinary tract symptoms, symptom details unspecified    - tamsulosin (FLOMAX) 0.4 MG capsule; Take 1 capsule (0.4 mg) by mouth daily    Kidney stone  Infrequent; urinalysis today.  Has periodic pain.   - UA Macroscopic with reflex to Microscopic and Culture - Lab Collect; Future  - HYDROcodone-acetaminophen (NORCO) 5-325 MG tablet; Take 1 tablet by mouth every 6 hours as needed for pain  - UA Macroscopic with reflex to Microscopic and Culture - Lab Collect  - OFFICE/OUTPT VISIT,EST,LEVL IV    Patient has been advised of split billing requirements and indicates understanding: Yes        BMI  Estimated body mass index is 32.47 kg/m  as calculated from the following:    Height as of this encounter: 1.847 m (6' 0.72\").    Weight as of this encounter: 110.8 kg (244 lb 3.2 oz).   Weight management plan: Discussed healthy diet and exercise guidelines    Counseling  Appropriate preventive services were discussed with this patient, including applicable screening as appropriate for fall prevention, nutrition, physical activity, Tobacco-use cessation, weight loss and cognition.  Checklist reviewing preventive services available has been given to the patient.  Reviewed patient's diet, addressing concerns and/or questions.   He is at risk for lack of exercise and has been provided with information to increase physical activity for the benefit of his well-being.   He is at risk for psychosocial distress and has been provided with information to reduce risk.   Discussed possible causes of fatigue. Patient reported safety concerns were addressed today.The patient was provided with " written information regarding signs of hearing loss.   I have reviewed Opioid Use Disorder and Substance Use Disorder risk factors and made any needed referrals.       See Patient Instructions    Jose Terry is a 79 year old, presenting for the following:  Annual Visit        6/5/2024     1:37 PM   Additional Questions   Roomed by [facility]   Accompanied by self         6/5/2024     1:37 PM   Patient Reported Additional Medications   Patient reports taking the following new medications none         Health Care Directive  Patient has a Health Care Directive on file  Advance care planning document is on file and is current.    HPI  Discharged from [facility];just needs yearly prostate specific antigen (PSA)>    Began on mirtazepine for pruritus; past derm evaluation and multiple topicals did not work.  Has tried Zyrtec (cetirizine) and benedryl.  Mirtazepine has helped him sleep better, but does not feel helps the itching.  Is open to sertraline or paroxetine.     Seen by cardiology; taken off nitrate due to some light headedness.      Toenail fungus. Used [facility] Tolnaftate which is controlling.  Numbness in toes at night with the sheets at night.      Leg weakness.  Feels like trying to exercise but has been having troubles building back up.                6/3/2024   General Health   How would you rate your overall physical health? (!) FAIR   Feel stress (tense, anxious, or unable to sleep) Only a little   (!) STRESS CONCERN      6/3/2024   Nutrition   Diet: Low fat/cholesterol    Carbohydrate counting         6/3/2024   Exercise   Days per week of moderate/strenous exercise 3 days   Average minutes spent exercising at this level 20 min         6/3/2024   Social Factors   Frequency of gathering with friends or relatives Once a week   Worry food won't last until get money to buy more No   Food not last or not have enough money for food? No   Do you have housing?  Yes   Are you worried about losing your housing?  No   Lack of transportation? No   Unable to get utilities (heat,electricity)? No         6/5/2024   Fall Risk   Gait Speed Test (Document in seconds) 4.3   Gait Speed Test Interpretation Less than or equal to 5.00 seconds - PASS          6/3/2024   Activities of Daily Living- Home Safety   Needs help with the following daily activites None of the above   Safety concerns in the home No grab bars in the bathroom         6/3/2024   Dental   Dentist two times every year? Yes         6/3/2024   Hearing Screening   Hearing concerns? (!) IT'S HARD TO FOLLOW A CONVERSATION IN A NOISY RESTAURANT OR CROWDED ROOM.         6/3/2024   Driving Risk Screening   Patient/family members have concerns about driving No         6/3/2024   General Alertness/Fatigue Screening   Have you been more tired than usual lately? (!) YES         6/3/2024   Urinary Incontinence Screening   Bothered by leaking urine in past 6 months No         6/3/2024   TB Screening   Were you born outside of the [place]? No         Today's PHQ-2 Score:       6/4/2024     4:05 PM   PHQ-2 ( 1999 [employer])   Q1: Little interest or pleasure in doing things 0   Q2: Feeling down, depressed or hopeless 0   PHQ-2 Score 0   Q1: Little interest or pleasure in doing things Not at all   Q2: Feeling down, depressed or hopeless Not at all   PHQ-2 Score 0           6/3/2024   Substance Use   Alcohol more than 3/day or more than 7/wk No   Do you have a current opioid prescription? (!) YES   How severe/bad is pain from 1 to 10? 5/10   Do you use any other substances recreationally? (!) ALCOHOL    (!) CANNABIS PRODUCTS    (!) PRESCRIPTION DRUGS     Social History     Tobacco Use    Smoking status: Never     Passive exposure: Never    Smokeless tobacco: Never   Vaping Use    Vaping status: Never Used   Substance Use Topics    Alcohol use: Yes     Types: 7 Standard drinks or equivalent per week     Comment: 2 per week    Drug use: Yes     Types: Marijuana     Comment: nightly help them  sleep       ASCVD Risk   The 10-year ASCVD risk score (Joslyn ROUSE, et al., 2019) is: 28.2%    Values used to calculate the score:      Age: 79 years      Sex: Male      Is Non- : No      Diabetic: No      Tobacco smoker: No      Systolic Blood Pressure: 123 mmHg      Is BP treated: Yes      HDL Cholesterol: 47 mg/dL      Total Cholesterol: 146 mg/dL            Reviewed and updated as needed this visit by Provider                    Past Medical History:   Diagnosis Date    Arthritis 2012    right hip    CAD (coronary artery disease)     small vessel disease    Cancer (H) Prostate    Carotid artery stenosis     Chronic stable angina (H24)     HTN (hypertension)     Hyperlipidemia     Mumps     DARYL (obstructive sleep apnea)      Past Surgical History:   Procedure Laterality Date    ABDOMEN SURGERY      APPENDECTOMY      APPENDECTOMY      BIOPSY  1980 on    colonoscopies    CARDIAC SURGERY  May 2011    Cardiac Cath    COLONOSCOPY  1980 on    CORONARY ANGIOGRAPHY ADULT ORDER  2011, 2016    med tx, small vessels    CYSTOSCOPY      EYE SURGERY  03/2017    retinal detachment     EYE SURGERY      lasix    HEAD & NECK SURGERY  Aug 11th & 19th, 2016    retina eye surgeries    NOSE SURGERY      SURGICAL HISTORY OF -       tonsils    SURGICAL HISTORY OF -       cystoscopy for kidney stones    SURGICAL HISTORY OF -       nasal surgery    TONSILLECTOMY       Current providers sharing in care for this patient include:  Patient Care Team:  Michael Oliver MD as PCP - General (Internal Medicine - Pediatrics)  Michael Oliver MD as Assigned PCP  William Terrell MD as MD (Urology)  Anand Maxwell MD as MD (Internal Medicine - Sleep Medicine)  Charlotte Kay MD as Assigned Heart and Vascular Provider  Anand Maxwell MD as Assigned Sleep Provider  Goltz, Bennett Ezra, PA-C as Assigned Neuroscience Provider  Mee Rosa [facility] as  "Pharmacist (Pharmacist)  Mee Rosa, [facility] as Assigned MTM Pharmacist    The following health maintenance items are reviewed in Epic and correct as of today:  Health Maintenance   Topic Date Due    COVID-19 Vaccine (8 - 2023-24 season) 12/12/2023    LIPID  05/30/2024    MEDICARE ANNUAL WELLNESS VISIT  05/30/2024    ANNUAL REVIEW OF HM ORDERS  03/27/2025    FALL RISK ASSESSMENT  06/05/2025    GLUCOSE  05/30/2026    DTAP/TDAP/TD IMMUNIZATION (3 - Td or Tdap) 05/15/2028    ADVANCE CARE PLANNING  05/30/2028    HEPATITIS C SCREENING  Completed    PHQ-2 (once per calendar year)  Completed    INFLUENZA VACCINE  Completed    Pneumococcal Vaccine: 65+ Years  Completed    ZOSTER IMMUNIZATION  Completed    RSV VACCINE (Pregnancy & 60+)  Completed    IPV IMMUNIZATION  Aged Out    HPV IMMUNIZATION  Aged Out    MENINGITIS IMMUNIZATION  Aged Out    RSV MONOCLONAL ANTIBODY  Aged Out    COLORECTAL CANCER SCREENING  Discontinued         Review of Systems  Constitutional, HEENT, cardiovascular, pulmonary, GI, , musculoskeletal, neuro, skin, endocrine and psych systems are negative, except as otherwise noted.     Objective    Exam  /76 (BP Location: Right arm, Patient Position: Sitting, Cuff Size: Adult Large)   Pulse 64   Temp 98  F (36.7  C) (Oral)   Resp 16   Ht 1.847 m (6' 0.72\")   Wt 110.8 kg (244 lb 3.2 oz)   SpO2 96%   BMI 32.47 kg/m     Estimated body mass index is 32.47 kg/m  as calculated from the following:    Height as of this encounter: 1.847 m (6' 0.72\").    Weight as of this encounter: 110.8 kg (244 lb 3.2 oz).    Physical Exam  GENERAL: alert and no distress  EYES: Eyes grossly normal to inspection, PERRL and conjunctivae and sclerae normal  HENT: ear canals and TM's normal, nose and mouth without ulcers or lesions  NECK: no adenopathy, no asymmetry, masses, or scars  RESP: lungs clear to auscultation - no rales, rhonchi or wheezes  CV: regular rate and rhythm, normal S1 S2, no S3 or S4, no murmur, " click or rub, no peripheral edema  ABDOMEN: soft, nontender, no hepatosplenomegaly, no masses and bowel sounds normal  MS: no gross musculoskeletal defects noted, no edema  SKIN: no suspicious lesions or rashes  NEURO: Normal strength and tone, mentation intact and speech normal  PSYCH: mentation appears normal, affect normal/bright        6/5/2024   Mini Cog   Clock Draw Score 2 Normal   3 Item Recall 3 objects recalled   Mini Cog Total Score 5              Signed Electronically by: Michael Oliver MD

## 2024-06-06 LAB
ALBUMIN SERPL BCG-MCNC: 4.9 G/DL (ref 3.5–5.2)
ALP SERPL-CCNC: 70 U/L (ref 40–150)
ALT SERPL W P-5'-P-CCNC: 21 U/L (ref 0–70)
ANION GAP SERPL CALCULATED.3IONS-SCNC: 15 MMOL/L (ref 7–15)
AST SERPL W P-5'-P-CCNC: 24 U/L (ref 0–45)
BILIRUB SERPL-MCNC: 0.5 MG/DL
BUN SERPL-MCNC: 17.8 MG/DL (ref 8–23)
CALCIUM SERPL-MCNC: 9.8 MG/DL (ref 8.8–10.2)
CHLORIDE SERPL-SCNC: 105 MMOL/L (ref 98–107)
CK SERPL-CCNC: 71 U/L (ref 39–308)
CREAT SERPL-MCNC: 0.89 MG/DL (ref 0.67–1.17)
DEPRECATED HCO3 PLAS-SCNC: 22 MMOL/L (ref 22–29)
EGFRCR SERPLBLD CKD-EPI 2021: 88 ML/MIN/1.73M2
GLUCOSE SERPL-MCNC: 95 MG/DL (ref 70–99)
POTASSIUM SERPL-SCNC: 4.3 MMOL/L (ref 3.4–5.3)
PROT SERPL-MCNC: 7.3 G/DL (ref 6.4–8.3)
PSA SERPL DL<=0.01 NG/ML-MCNC: 0.22 NG/ML (ref 0–6.5)
SODIUM SERPL-SCNC: 142 MMOL/L (ref 135–145)
TSH SERPL DL<=0.005 MIU/L-ACNC: 1.39 UIU/ML (ref 0.3–4.2)

## 2024-06-07 NOTE — RESULT ENCOUNTER NOTE
Maureen Terry ,    The results from your recent lab work are within normal limits.    -All of your labs are normal.      Thank you for choosing [facility] for your health care needs,      Cristina Jeffers PA-C

## 2024-06-09 LAB — TESTOST SERPL-MCNC: 308 NG/DL (ref 240–950)

## 2024-06-12 NOTE — RESULT ENCOUNTER NOTE
Maureen Terry ,    The results from your recent lab work are within normal limits.  The [facility] lab is within the normal limits.        Thank you for choosing [facility] for your health care needs,      Cristina Jeffers PA-C  Helping while Dr. Oliver is out of the office this week and next week.

## 2024-07-09 ENCOUNTER — OFFICE VISIT (OUTPATIENT)
Dept: PEDIATRICS | Facility: CLINIC | Age: 79
End: 2024-07-09
Payer: COMMERCIAL

## 2024-07-09 VITALS
SYSTOLIC BLOOD PRESSURE: 124 MMHG | HEIGHT: 74 IN | DIASTOLIC BLOOD PRESSURE: 77 MMHG | HEART RATE: 67 BPM | OXYGEN SATURATION: 96 % | BODY MASS INDEX: 32.6 KG/M2 | WEIGHT: 254 LBS | TEMPERATURE: 98.2 F | RESPIRATION RATE: 16 BRPM

## 2024-07-09 DIAGNOSIS — G62.9 NEUROPATHY: ICD-10-CM

## 2024-07-09 DIAGNOSIS — B35.1 ONYCHOMYCOSIS: Primary | ICD-10-CM

## 2024-07-09 PROCEDURE — 99214 OFFICE O/P EST MOD 30 MIN: CPT | Performed by: PEDIATRICS

## 2024-07-09 RX ORDER — CICLOPIROX 80 MG/ML
SOLUTION TOPICAL
Qty: 6.6 ML | Refills: 11 | Status: SHIPPED | OUTPATIENT
Start: 2024-07-09

## 2024-07-09 NOTE — PROGRESS NOTES
Assessment & Plan     (B35.1) Onychomycosis  (primary encounter diagnosis)  Comment:   Plan: ciclopirox (PENLAC) 8 % external solution            (G62.9) Neuropathy  Comment: discussed possibly working this up further, but also discussed that treatments such as gabapentin might interact with his other medications that he takes. I would favor trying a topical medical first. He already has follow up with PCP in a month.   Plan: PI            Patient Instructions   For foot discomfort, try over the counter lidocaine gel or capsacian cream. Apply this to feet at night to see if this helps with the pain.    For toenails - use topical prescription that I have sent to your pharmacy.    Jose Terry is a 79 year old, presenting for the following health issues:  Fungal Infection    History of Present Illness       Reason for visit:  Left big toe - sore, purple, nail coming off (fungus?) other toes on left foot sore at times  Symptom onset:  1-2 weeks ago  Symptoms include:  Sore, toe red/purple,  losing toe nail  Symptom intensity:  Moderate  Symptom progression:  Worsening  Had these symptoms before:  Yes  Has tried/received treatment for these symptoms:  Yes  Previous treatment was successful:  Yes  Prior treatment description:  Fungus cream  What makes it worse:  Wearing shoes and sleeping on back with bed comforter on toes  What makes it better:  No shoes and sleeping on sideHe consumes 1 sweetened beverage(s) daily.He exercises with enough effort to increase his heart rate 10 to 19 minutes per day.  He exercises with enough effort to increase his heart rate 3 or less days per week.   He is taking medications regularly.     New patient to me    H/o PAD, CAD, hypertension, HL    Patient also with c/o toe pain waking him up at night when sheet rubs against it.              Objective    /77 (BP Location: Right arm, Patient Position: Sitting, Cuff Size: Adult Large)   Pulse 67   Temp 98.2  F (36.8  C) (Oral)   " Resp 16   Ht 1.88 m (6' 2\")   Wt 115.2 kg (254 lb)   SpO2 96%   BMI 32.61 kg/m    Body mass index is 32.61 kg/m .  Physical Exam   TOE: left big toe - normal skin, nail is yellow, thickened, different from other toes            Signed Electronically by: Margaret Brown MD    "

## 2024-07-09 NOTE — PATIENT INSTRUCTIONS
For foot discomfort, try over the counter lidocaine gel or capsacian cream. Apply this to feet at night to see if this helps with the pain.    For toenails - use topical prescription that I have sent to your pharmacy.

## 2024-07-10 ENCOUNTER — MYC REFILL (OUTPATIENT)
Dept: PEDIATRICS | Facility: CLINIC | Age: 79
End: 2024-07-10
Payer: COMMERCIAL

## 2024-07-10 DIAGNOSIS — L29.9 ITCHING: ICD-10-CM

## 2024-07-10 RX ORDER — SERTRALINE HYDROCHLORIDE 25 MG/1
25 TABLET, FILM COATED ORAL DAILY
Qty: 30 TABLET | Refills: 2 | Status: CANCELLED | OUTPATIENT
Start: 2024-07-10

## 2024-07-10 RX ORDER — SERTRALINE HYDROCHLORIDE 25 MG/1
25 TABLET, FILM COATED ORAL DAILY
Qty: 90 TABLET | Refills: 1 | Status: SHIPPED | OUTPATIENT
Start: 2024-07-10

## 2024-07-10 NOTE — TELEPHONE ENCOUNTER
Dr. Oliver,    Please review pharmacy transfer for sertraline. Drug-drug interaction warning triggered. Med and pharmacy pended below.     Thanks!  Kaiser Valenzuela RN on 7/10/2024 at 10:09 AM

## 2024-08-06 ENCOUNTER — OFFICE VISIT (OUTPATIENT)
Dept: PEDIATRICS | Facility: CLINIC | Age: 79
End: 2024-08-06
Payer: COMMERCIAL

## 2024-08-06 VITALS
HEIGHT: 74 IN | SYSTOLIC BLOOD PRESSURE: 124 MMHG | BODY MASS INDEX: 32.33 KG/M2 | OXYGEN SATURATION: 94 % | WEIGHT: 251.9 LBS | TEMPERATURE: 97.9 F | DIASTOLIC BLOOD PRESSURE: 72 MMHG | HEART RATE: 60 BPM | RESPIRATION RATE: 16 BRPM

## 2024-08-06 DIAGNOSIS — L29.9 ITCHING: ICD-10-CM

## 2024-08-06 DIAGNOSIS — L29.9 PRURITIC DISORDER: Primary | ICD-10-CM

## 2024-08-06 PROCEDURE — 99213 OFFICE O/P EST LOW 20 MIN: CPT | Performed by: INTERNAL MEDICINE

## 2024-08-06 PROCEDURE — G2211 COMPLEX E/M VISIT ADD ON: HCPCS | Performed by: INTERNAL MEDICINE

## 2024-08-06 RX ORDER — MIRTAZAPINE 7.5 MG/1
7.5 TABLET, FILM COATED ORAL AT BEDTIME
Qty: 90 TABLET | Refills: 3 | Status: SHIPPED | OUTPATIENT
Start: 2024-08-06

## 2024-08-06 ASSESSMENT — PAIN SCALES - GENERAL: PAINLEVEL: NO PAIN (0)

## 2024-08-06 NOTE — PROGRESS NOTES
Assessment & Plan     Itching  Ongoing, long standing.  Evaluation at derm and allergist not helpful.  Trial of zoloft and gabapentin thus far unsuccessful, as has been trial of topicals and antihistamines.    Patient Instructions   Let's increase your zoloft (sertraline) to 50 mg for 30 days, then 75 mg for 30 days.    If no improvement, then may decrease back to 50 mg for 2 weeks, then 25 mg for 2 weeks, then stop.    Let me know if you get any new symptoms or side effects.     Michael Oliver MD  Internal Medicine and Pediatrics      - sertraline (ZOLOFT) 50 MG tablet; Take 1 tablet (50 mg) by mouth daily For 30 days, then 1.5 tabs (75 mg) thereafter.  - mirtazapine (REMERON) 7.5 MG tablet; Take 1 tablet (7.5 mg) by mouth at bedtime                Jose Terry is a 79 year old, presenting for the following health issues:  Follow Up (Insomnia and itching )        8/6/2024     2:46 PM   Additional Questions   Roomed by TORREY Smith   Accompanied by n/a         8/6/2024     2:46 PM   Patient Reported Additional Medications   Patient reports taking the following new medications n/a     History of Present Illness       Reason for visit:  Med check    He eats 0-1 servings of fruits and vegetables daily.He consumes 0 sweetened beverage(s) daily.He exercises with enough effort to increase his heart rate 10 to 19 minutes per day.  He exercises with enough effort to increase his heart rate 3 or less days per week.   He is taking medications regularly.     Tried zoloft for his itching, but not effective.  No side effects. Feels it all over from bottom of feet to top of his head; notes that any small sheet touching skin, feels has to itch it.  Scratching alleviates it.    Had seen dermatology, and tried topicals and antihistamines.  Seen by allergist who did skin tests as well.  Has gone on about 10 years ago since moved to [place].  Not incapacitating, but just annoying.     Sleep is good, takes remeron every night.   "    Gabapentin in past did not work at a dermatologist..                     Review of Systems  Constitutional, HEENT, cardiovascular, pulmonary, GI, , musculoskeletal, neuro, skin, endocrine and psych systems are negative, except as otherwise noted.      Objective    /72   Pulse 60   Temp 97.9  F (36.6  C) (Oral)   Resp 16   Ht 1.867 m (6' 1.5\")   Wt 114.3 kg (251 lb 14.4 oz)   SpO2 94%   BMI 32.78 kg/m    Body mass index is 32.78 kg/m .  Physical Exam   GENERAL: alert and no distress  EYES: Eyes grossly normal to inspection, PERRL and conjunctivae and sclerae normal  HENT: ear canals and TM's normal, nose and mouth without ulcers or lesions  NECK: no adenopathy, no asymmetry, masses, or scars  RESP: lungs clear to auscultation - no rales, rhonchi or wheezes  CV: regular rate and rhythm, normal S1 S2, no S3 or S4, no murmur, click or rub, no peripheral edema  ABDOMEN: soft, nontender, no hepatosplenomegaly, no masses and bowel sounds normal  MS: no gross musculoskeletal defects noted, no edema  SKIN: no suspicious lesions or rashes  NEURO: Normal strength and tone, mentation intact and speech normal  PSYCH: mentation appears normal, affect normal/bright            Signed Electronically by: Michael Oliver MD    "

## 2024-08-06 NOTE — PROGRESS NOTES
The longitudinal plan of care for the diagnosis(es)/condition(s) as documented were addressed during this visit. Due to the added complexity in care, I will continue to support [facility] in the subsequent management and with ongoing continuity of care.

## 2024-08-06 NOTE — PATIENT INSTRUCTIONS
Let's increase your zoloft (sertraline) to 50 mg for 30 days, then 75 mg for 30 days.    If no improvement, then may decrease back to 50 mg for 2 weeks, then 25 mg for 2 weeks, then stop.    Let me know if you get any new symptoms or side effects.     Michael Oliver MD  Internal Medicine and Pediatrics

## 2024-08-07 ENCOUNTER — TRANSFERRED RECORDS (OUTPATIENT)
Dept: HEALTH INFORMATION MANAGEMENT | Facility: CLINIC | Age: 79
End: 2024-08-07
Payer: COMMERCIAL

## 2024-08-12 ENCOUNTER — TRANSFERRED RECORDS (OUTPATIENT)
Dept: HEALTH INFORMATION MANAGEMENT | Facility: CLINIC | Age: 79
End: 2024-08-12
Payer: COMMERCIAL

## 2024-08-13 DIAGNOSIS — I25.10 CORONARY ARTERY DISEASE INVOLVING NATIVE CORONARY ARTERY OF NATIVE HEART WITHOUT ANGINA PECTORIS: Primary | ICD-10-CM

## 2024-08-13 RX ORDER — RANOLAZINE 500 MG/1
500 TABLET, EXTENDED RELEASE ORAL
Qty: 180 TABLET | Refills: 0 | Status: SHIPPED | OUTPATIENT
Start: 2024-08-13

## 2024-09-23 ENCOUNTER — TRANSFERRED RECORDS (OUTPATIENT)
Dept: HEALTH INFORMATION MANAGEMENT | Facility: CLINIC | Age: 79
End: 2024-09-23
Payer: COMMERCIAL

## 2024-10-25 ENCOUNTER — TRANSFERRED RECORDS (OUTPATIENT)
Dept: HEALTH INFORMATION MANAGEMENT | Facility: CLINIC | Age: 79
End: 2024-10-25
Payer: COMMERCIAL

## 2024-10-29 ENCOUNTER — OFFICE VISIT (OUTPATIENT)
Dept: PEDIATRICS | Facility: CLINIC | Age: 79
End: 2024-10-29
Payer: COMMERCIAL

## 2024-10-29 VITALS
HEIGHT: 74 IN | WEIGHT: 259.4 LBS | RESPIRATION RATE: 14 BRPM | DIASTOLIC BLOOD PRESSURE: 72 MMHG | OXYGEN SATURATION: 94 % | BODY MASS INDEX: 33.29 KG/M2 | TEMPERATURE: 98.7 F | HEART RATE: 64 BPM | SYSTOLIC BLOOD PRESSURE: 112 MMHG

## 2024-10-29 DIAGNOSIS — B35.1 ONYCHOMYCOSIS: ICD-10-CM

## 2024-10-29 DIAGNOSIS — N20.0 KIDNEY STONE: Primary | ICD-10-CM

## 2024-10-29 DIAGNOSIS — H35.3290 EXUDATIVE AGE-RELATED MACULAR DEGENERATION, UNSPECIFIED LATERALITY, UNSPECIFIED STAGE (H): ICD-10-CM

## 2024-10-29 LAB
ALBUMIN UR-MCNC: ABNORMAL MG/DL
AMORPH CRY #/AREA URNS HPF: ABNORMAL /HPF
APPEARANCE UR: CLEAR
BACTERIA #/AREA URNS HPF: ABNORMAL /HPF
BILIRUB UR QL STRIP: NEGATIVE
COLOR UR AUTO: YELLOW
GLUCOSE UR STRIP-MCNC: NEGATIVE MG/DL
HGB UR QL STRIP: NEGATIVE
KETONES UR STRIP-MCNC: ABNORMAL MG/DL
LEUKOCYTE ESTERASE UR QL STRIP: NEGATIVE
MUCOUS THREADS #/AREA URNS LPF: PRESENT /LPF
NITRATE UR QL: NEGATIVE
PH UR STRIP: 8.5 [PH] (ref 5–7)
RBC #/AREA URNS AUTO: ABNORMAL /HPF
SP GR UR STRIP: 1.02 (ref 1–1.03)
SQUAMOUS #/AREA URNS AUTO: ABNORMAL /LPF
UROBILINOGEN UR STRIP-ACNC: 2 E.U./DL
WBC #/AREA URNS AUTO: ABNORMAL /HPF

## 2024-10-29 PROCEDURE — 87101 SKIN FUNGI CULTURE: CPT | Performed by: INTERNAL MEDICINE

## 2024-10-29 PROCEDURE — 81001 URINALYSIS AUTO W/SCOPE: CPT | Performed by: INTERNAL MEDICINE

## 2024-10-29 PROCEDURE — 80053 COMPREHEN METABOLIC PANEL: CPT | Performed by: INTERNAL MEDICINE

## 2024-10-29 PROCEDURE — 99214 OFFICE O/P EST MOD 30 MIN: CPT | Performed by: INTERNAL MEDICINE

## 2024-10-29 PROCEDURE — 36415 COLL VENOUS BLD VENIPUNCTURE: CPT | Performed by: INTERNAL MEDICINE

## 2024-10-29 RX ORDER — HYDROCODONE BITARTRATE AND ACETAMINOPHEN 5; 325 MG/1; MG/1
1 TABLET ORAL EVERY 6 HOURS PRN
Qty: 18 TABLET | Refills: 0 | Status: SHIPPED | OUTPATIENT
Start: 2024-10-29 | End: 2024-11-01

## 2024-10-29 ASSESSMENT — PAIN SCALES - GENERAL: PAINLEVEL_OUTOF10: MODERATE PAIN (5)

## 2024-10-29 NOTE — PROGRESS NOTES
"  Assessment & Plan     Onychomycosis  Culture pending.  He'd like to \"wait\" on lamisil if positive   - Fungus Culture,  skin, hair, or nail    Kidney stone  Patient Instructions   Lab work today:  We can do labs in the exam room today, or you can get them done downstairs in the lab.      They will contact you for a CT scan of your kidneys.    May take vicodin as needed for pain.  No driving.     Double your flomax (tamsulosin) to 0. 8 mg daily for next 5 days.    If your toenail fungus comes back abnormal we may consider a treatment with lamisil.    Michael Oliver MD  Internal Medicine and Pediatrics         -  Macroscopic with reflex to Microscopic and Culture - Lab Collect; Future  - CT Abdomen Pelvis w Contrast; Future  - HYDROcodone-acetaminophen (NORCO) 5-325 MG tablet; Take 1 tablet by mouth every 6 hours as needed for pain.  - Comprehensive metabolic panel (BMP + Alb, Alk Phos, ALT, AST, Total. Bili, TP); Future    Exudative age-related macular degeneration, unspecified laterality, unspecified stage (H)  Ongoing management per ophthalmology.             See Patient Instructions    Jose Terry is a 79 year old, presenting for the following health issues:  Kidney Problem and Nail Problem (Recheck toe)      10/29/2024    11:15 AM   Additional Questions   Roomed by Ny Hampton CMA     Kidney Problem    History of Present Illness       CKD: He uses over the counter pain medication, including Tylenol, a few times a week.    He eats 2-3 servings of fruits and vegetables daily.He consumes 0 sweetened beverage(s) daily.He exercises with enough effort to increase his heart rate 10 to 19 minutes per day.  He exercises with enough effort to increase his heart rate 3 or less days per week.   He is taking medications regularly.  Left flank pain, this flare started a week ago. Tries to use tylenol but it doesn't really work. Denies urinary frequency, dysuria or hematuria.    Patient would like his toe rechecked to " "make sure the toe fungus is gone    Has had some left kidney/flank pain for over 1 week. Hurts to move.  Feels like a kidney     No blood in urine.  No fevers, vomitig  No diarrhea or constipation.   In past has used tylenol and cannabis.    Has not used hydrocodone/acetaminophen               Review of Systems  Constitutional, HEENT, cardiovascular, pulmonary, GI, , musculoskeletal, neuro, skin, endocrine and psych systems are negative, except as otherwise noted.      Objective    /72 (BP Location: Right arm, Patient Position: Sitting, Cuff Size: Adult Large)   Pulse 64   Temp 98.7  F (37.1  C) (Temporal)   Resp 14   Ht 1.867 m (6' 1.5\")   Wt 117.7 kg (259 lb 6.4 oz)   SpO2 94%   BMI 33.76 kg/m    Body mass index is 33.76 kg/m .  Physical Exam   GENERAL: alert and no distress  EYES: Eyes grossly normal to inspection, PERRL and conjunctivae and sclerae normal  HENT: ear canals and TM's normal, nose and mouth without ulcers or lesions  NECK: no adenopathy, no asymmetry, masses, or scars  RESP: lungs clear to auscultation - no rales, rhonchi or wheezes  CV: regular rate and rhythm, normal S1 S2, no S3 or S4, no murmur, click or rub, no peripheral edema  ABDOMEN: soft, nontender, no hepatosplenomegaly, no masses and bowel sounds normal  MS: no gross musculoskeletal defects noted, no edema  SKIN: no suspicious lesions or rashes  NEURO: Normal strength and tone, mentation intact and speech normal  PSYCH: mentation appears normal, affect normal/bright            Signed Electronically by: Michael Oliver MD    "

## 2024-10-29 NOTE — PATIENT INSTRUCTIONS
Lab work today:  We can do labs in the exam room today, or you can get them done downstairs in the lab.      They will contact you for a CT scan of your kidneys.    May take vicodin as needed for pain.  No driving.     Double your flomax (tamsulosin) to 0. 8 mg daily for next 5 days.    If your toenail fungus comes back abnormal we may consider a treatment with lamisil.    Michael Oliver MD  Internal Medicine and Pediatrics

## 2024-10-30 LAB
ALBUMIN SERPL BCG-MCNC: 4.6 G/DL (ref 3.5–5.2)
ALP SERPL-CCNC: 72 U/L (ref 40–150)
ALT SERPL W P-5'-P-CCNC: 21 U/L (ref 0–70)
ANION GAP SERPL CALCULATED.3IONS-SCNC: 13 MMOL/L (ref 7–15)
AST SERPL W P-5'-P-CCNC: 22 U/L (ref 0–45)
BILIRUB SERPL-MCNC: 0.5 MG/DL
BUN SERPL-MCNC: 15.3 MG/DL (ref 8–23)
CALCIUM SERPL-MCNC: 9.7 MG/DL (ref 8.8–10.4)
CHLORIDE SERPL-SCNC: 104 MMOL/L (ref 98–107)
CREAT SERPL-MCNC: 0.98 MG/DL (ref 0.67–1.17)
EGFRCR SERPLBLD CKD-EPI 2021: 79 ML/MIN/1.73M2
GLUCOSE SERPL-MCNC: 108 MG/DL (ref 70–99)
HCO3 SERPL-SCNC: 25 MMOL/L (ref 22–29)
POTASSIUM SERPL-SCNC: 4.5 MMOL/L (ref 3.4–5.3)
PROT SERPL-MCNC: 7.1 G/DL (ref 6.4–8.3)
SODIUM SERPL-SCNC: 142 MMOL/L (ref 135–145)

## 2024-11-05 DIAGNOSIS — I25.10 CORONARY ARTERY DISEASE INVOLVING NATIVE CORONARY ARTERY OF NATIVE HEART WITHOUT ANGINA PECTORIS: ICD-10-CM

## 2024-11-05 RX ORDER — RANOLAZINE 500 MG/1
500 TABLET, EXTENDED RELEASE ORAL
Qty: 180 TABLET | Refills: 3 | Status: SHIPPED | OUTPATIENT
Start: 2024-11-05

## 2024-11-07 ENCOUNTER — HOSPITAL ENCOUNTER (OUTPATIENT)
Dept: CT IMAGING | Facility: CLINIC | Age: 79
Discharge: HOME | End: 2024-11-07
Attending: INTERNAL MEDICINE | Admitting: INTERNAL MEDICINE
Payer: COMMERCIAL

## 2024-11-07 DIAGNOSIS — N20.0 KIDNEY STONE: ICD-10-CM

## 2024-11-07 PROCEDURE — 74176 CT ABD & PELVIS W/O CONTRAST: CPT

## 2024-11-08 DIAGNOSIS — N20.0 KIDNEY STONE: Primary | ICD-10-CM

## 2024-11-13 ENCOUNTER — OFFICE VISIT (OUTPATIENT)
Dept: UROLOGY | Facility: CLINIC | Age: 79
End: 2024-11-13
Attending: INTERNAL MEDICINE
Payer: COMMERCIAL

## 2024-11-13 VITALS — SYSTOLIC BLOOD PRESSURE: 138 MMHG | DIASTOLIC BLOOD PRESSURE: 72 MMHG | HEART RATE: 71 BPM | TEMPERATURE: 98.2 F

## 2024-11-13 DIAGNOSIS — N20.0 KIDNEY STONE: ICD-10-CM

## 2024-11-13 DIAGNOSIS — N28.1 ACQUIRED CYST OF KIDNEY: Primary | ICD-10-CM

## 2024-11-13 PROCEDURE — 99204 OFFICE O/P NEW MOD 45 MIN: CPT | Performed by: STUDENT IN AN ORGANIZED HEALTH CARE EDUCATION/TRAINING PROGRAM

## 2024-11-13 RX ORDER — KETOROLAC TROMETHAMINE 10 MG/1
10 TABLET, FILM COATED ORAL EVERY 6 HOURS PRN
Qty: 20 TABLET | Refills: 0 | Status: SHIPPED | OUTPATIENT
Start: 2024-11-13

## 2024-11-13 ASSESSMENT — PAIN SCALES - GENERAL: PAINLEVEL_OUTOF10: MILD PAIN (2)

## 2024-11-13 NOTE — PROGRESS NOTES
Chief Complaint:   Left flank pain           Consult or Referral:     Mr. Hood Arizmendi is a 79 year old male seen at the request of Dr. Pappas.         History of Present Illness:     Hood Arizmendi is a 79 year old male being seen for left flank pain.  Duration of problem: Few weeks  Previous treatments: Prior history of ureteroscopy for kidney stones      Reviewed previous notes from Dr. pappas    Harrison sees me today for concerns regarding left flank pain  He rates it at a constant 2 which can go up to a 7 occasionally  He has been needing pain pills because of the discomfort he is experiencing  History of prostate cancer currently postirradiation 6 years PSA stable           Past Medical History:     Past Medical History:   Diagnosis Date    Arthritis 2012    right hip    CAD (coronary artery disease)     small vessel disease    Cancer (H) Prostate    Carotid artery stenosis     Chronic stable angina (H)     HTN (hypertension)     Hyperlipidemia     Mumps     DARYL (obstructive sleep apnea)             Past Surgical History:     Past Surgical History:   Procedure Laterality Date    ABDOMEN SURGERY      APPENDECTOMY      APPENDECTOMY      BIOPSY  1980 on    colonoscopies    CARDIAC SURGERY  May 2011    Cardiac Cath    COLONOSCOPY  1980 on    CORONARY ANGIOGRAPHY ADULT ORDER  2011, 2016    med tx, small vessels    CYSTOSCOPY      EYE SURGERY  03/2017    retinal detachment     EYE SURGERY      lasix    HEAD & NECK SURGERY  Aug 11th & 19th, 2016    retina eye surgeries    NOSE SURGERY      SURGICAL HISTORY OF -       tonsils    SURGICAL HISTORY OF -       cystoscopy for kidney stones    SURGICAL HISTORY OF -       nasal surgery    TONSILLECTOMY              Medications     Current Outpatient Medications   Medication Sig Dispense Refill    ketorolac (TORADOL) 10 MG tablet Take 1 tablet (10 mg) by mouth every 6 hours as needed for moderate pain. 20 tablet 0    amLODIPine (NORVASC) 2.5 MG tablet Take 1 tablet (2.5 mg)  by mouth daily 90 tablet 3    aspirin 81 MG tablet Take 81 mg by mouth 2 times daily OTC      balsalazide (COLAZAL) 750 MG capsule Take 2,250 mg by mouth 2 times daily Take 2 tablets twice daily      ciclopirox (PENLAC) 8 % external solution Apply to adjacent skin and affected nails daily.  Remove with alcohol every 7 days, then repeat. 6.6 mL 11    medical cannabis liquid (This is NOT a prescription, and does not certify that the patient has a qualifying medical condition for medical cannabis.  The purpose of this order is  to document that the patient reports taking medical cannabis.)      metoprolol succinate ER (TOPROL XL) 25 MG 24 hr tablet Take 1 tablet (25 mg) by mouth daily 90 tablet 3    mirtazapine (REMERON) 7.5 MG tablet Take 1 tablet (7.5 mg) by mouth at bedtime 90 tablet 3    netarsudil (RHOPRESSA) 0.02 % ophthalmic solution Place 1 drop into both eyes at bedtime      nitroGLYcerin (NITROSTAT) 0.4 MG sublingual tablet For chest pain place 1 tablet under the tongue every 5 minutes for 3 doses. If symptoms persist 5 minutes after 1st dose call 911. 30 tablet 3    polyethylene glycol (MIRALAX) 17 GM/Dose powder Take 17 g by mouth daily 510 g 1    psyllium 63 % POWD Take 2 teaspoonful by mouth daily Mix in 8 ounces of water 1 Bottle 11    ranolazine (RANEXA) 500 MG 12 hr tablet TAKE ONE TABLET BY MOUTH EVERY TWELVE HOURS 180 tablet 3    rosuvastatin (CRESTOR) 20 MG tablet Take 1 tablet (20 mg) by mouth daily 90 tablet 3    tamsulosin (FLOMAX) 0.4 MG capsule Take 1 capsule (0.4 mg) by mouth daily 90 capsule 3    timolol hemihydrate (BETIMOL) 0.5 % ophthalmic solution Place 1 drop into both eyes 2 times daily      travoprost FITO FREE (TRAVATAN Z) 0.004 % ophthalmic solution Place 1 drop into both eyes daily       No current facility-administered medications for this visit.            Family History:     Family History   Problem Relation Age of Onset    C.A.D. Mother     Hypertension Mother     Hyperlipidemia  Mother     Thyroid Disease Mother     C.A.D. Maternal Grandfather     Coronary Artery Disease Maternal Grandfather     Hyperlipidemia Maternal Grandfather     Cancer Father         lung cancer, smoker.             Social History:     Social History     Socioeconomic History    Marital status:      Spouse name: Not on file    Number of children: Not on file    Years of education: Not on file    Highest education level: Not on file   Occupational History    Not on file   Tobacco Use    Smoking status: Never     Passive exposure: Never    Smokeless tobacco: Never   Vaping Use    Vaping status: Never Used   Substance and Sexual Activity    Alcohol use: Yes     Types: 7 Standard drinks or equivalent per week     Comment: 2 per week    Drug use: Yes     Types: Marijuana     Comment: nightly help them sleep    Sexual activity: Yes     Partners: Female     Birth control/protection: None   Other Topics Concern     Service Not Asked    Blood Transfusions Not Asked    Caffeine Concern No     Comment: 1 cup     Occupational Exposure Not Asked    Hobby Hazards Not Asked    Sleep Concern Yes     Comment: sleep apnea , c-pap     Stress Concern Not Asked    Weight Concern Not Asked    Special Diet No    Back Care Not Asked    Exercise No    Bike Helmet Not Asked    Seat Belt Not Asked    Self-Exams Not Asked    Parent/sibling w/ CABG, MI or angioplasty before 65F 55M? Yes     Comment: Brother 4 bi-passes   Social History Narrative    Not on file     Social Drivers of Health     Financial Resource Strain: Low Risk  (6/3/2024)    Financial Resource Strain     Within the past 12 months, have you or your family members you live with been unable to get utilities (heat, electricity) when it was really needed?: No   Food Insecurity: Low Risk  (6/3/2024)    Food Insecurity     Within the past 12 months, did you worry that your food would run out before you got money to buy more?: No     Within the past 12 months, did the  food you bought just not last and you didn t have money to get more?: No   Transportation Needs: Low Risk  (6/3/2024)    Transportation Needs     Within the past 12 months, has lack of transportation kept you from medical appointments, getting your medicines, non-medical meetings or appointments, work, or from getting things that you need?: No   Physical Activity: Insufficiently Active (6/3/2024)    Exercise Vital Sign     Days of Exercise per Week: 3 days     Minutes of Exercise per Session: 20 min   Stress: No Stress Concern Present (6/3/2024)    [employer] - Occupational Stress Questionnaire     Feeling of Stress : Only a little   Social Connections: Unknown (6/3/2024)    Social Connection and Isolation Panel [NHANES]     Frequency of Communication with Friends and Family: Not on file     Frequency of Social Gatherings with Friends and Family: Once a week     Attends [employer] Services: Not on file     Active Member of Clubs or Organizations: Not on file     Attends Club or Organization Meetings: Not on file     Marital Status: Not on file   Interpersonal Safety: Low Risk  (6/5/2024)    Interpersonal Safety     Do you feel physically and emotionally safe where you currently live?: Yes     Within the past 12 months, have you been hit, slapped, kicked or otherwise physically hurt by someone?: No     Within the past 12 months, have you been humiliated or emotionally abused in other ways by your partner or ex-partner?: No   Housing Stability: Low Risk  (6/3/2024)    Housing Stability     Do you have housing? : Yes     Are you worried about losing your housing?: No            Allergies:   Sulfa antibiotics         Review of Systems:  From intake questionnaire     Skin: negative  Eyes: negative  Ears/Nose/Throat: negative  Respiratory: No shortness of breath, dyspnea on exertion, cough, or hemoptysis  Cardiovascular: No chest pain or palpitations  Gastrointestinal: negative; no  nausea/vomiting, constipation or diarrhea  Genitourinary: as per HPI  Musculoskeletal: negative  Neurologic: negative  Psychiatric: negative  Hematologic/Lymphatic/Immunologic: negative  Endocrine: negative         Physical Exam:     Patient is a 79 year old  male   Vitals: Blood pressure 138/72, pulse 71, temperature 98.2  F (36.8  C), temperature source Tympanic.  Constitutional: There is no height or weight on file to calculate BMI.  Alert, no acute distress, oriented, conversant  Eyes: no scleral icterus; extraocular muscles intact, moist conjunctivae  Neck: trachea midline, no thyromegaly  Ears/nose/mouth: throat/mouth:normal, good dentition  Respiratory: no respiratory distress, or pursed lip breathing  Cardiovascular: pulses strong and intact; no obvious jugular venous distension present  Gastrointestinal: soft, nontender, no organomegaly or masses,   Lymphatics: No inguinal adenopathy  Musculoskeletal: extremities normal, no peripheral edema  Skin: no suspicious lesions or rashes  Neuro: Alert, oriented, speech and mentation normal  Psych: affect and mood normal, alert and oriented to person, place and time  Gait: Normal  : deferred      Labs and Pathology:    The following labs were reviewed by me and discussed with the patient:  Component      Latest Ref Rng 10/29/2024  12:08 PM   Color Urine      Colorless, Straw, Light Yellow, Yellow  Yellow    Appearance Urine      Clear  Clear    Glucose Urine      Negative mg/dL Negative    Bilirubin Urine      Negative  Negative    Ketones Urine      Negative mg/dL Trace !    Specific Gravity Urine      1.003 - 1.035  1.020    Blood Urine      Negative  Negative    pH Urine      5.0 - 7.0  8.5 (H)    Protein Albumin Urine      Negative mg/dL Trace !    Urobilinogen Urine      0.2, 1.0 E.U./dL 2.0 !    Nitrite Urine      Negative  Negative    Leukocyte Esterase Urine      Negative  Negative      Component      Latest Ref Rng 6/5/2024  2:25 PM   PSA Tumor Marker       0.00 - 6.50 ng/mL 0.22        Significant for   Lab Results   Component Value Date    CR 0.98 10/29/2024    CR 0.89 06/05/2024    CR 0.88 05/30/2023    CR 0.78 04/14/2022    CR 1.08 12/11/2020    CR 0.97 09/24/2019    CR 0.93 05/15/2018    CR 0.88 12/13/2016    CR 0.84 08/26/2016    CR 0.89 07/25/2016    CR 1.12 10/27/2015    CR 1.00 04/07/2015    CR 1.05 12/05/2014    CR 0.94 04/25/2014     PSA   Date Value Ref Range Status   06/26/2018 11.40 (H) 0 - 4 ug/L Final     Comment:     Assay Method:  Chemiluminescence using Siemens Vista analyzer   05/15/2018 9.49 (H) 0 - 4 ug/L Final     Comment:     Assay Method:  Chemiluminescence using Siemens Vista analyzer     PSA Tumor Marker   Date Value Ref Range Status   06/05/2024 0.22 0.00 - 6.50 ng/mL Final   09/12/2023 0.16 0.00 - 6.50 ng/mL Final   03/06/2023 0.20 0.00 - 6.50 ng/mL Final   09/06/2022 0.29 0.00 - 4.00 ug/L Final             Imaging:    The following imaging exams were independently viewed and interpreted by me and discussed with patient:  CT Scan Abd/Pelvis:   Narrative & Impression   CT ABDOMEN & PELVIS WITHOUT CONTRAST November 7, 2024 3:24 PM     CLINICAL HISTORY: Abdominal pain. Kidney stone.     TECHNIQUE: CT scan of the abdomen and pelvis was performed without IV  contrast. Multiplanar reformats were obtained. Dose reduction  techniques were used.  CONTRAST: None.     COMPARISON: December 19, 2016.     FINDINGS:   LOWER CHEST: Minimal peripheral atelectasis and/or fibrosis. No  effusions.     HEPATOBILIARY: No significant mass or bile duct dilatation. No  calcified gallstones.      PANCREAS: No significant mass, duct dilatation, or inflammatory  change.     SPLEEN: Normal size.     ADRENAL GLANDS: No significant nodules.     KIDNEYS/BLADDER: 5 mm nonobstructing stone in the upper left kidney.  No definite right-sided stones. No ureteral stones or hydronephrosis  bilaterally. Benign cyst requiring no follow-up.     BOWEL: No obstruction or inflammatory  change.     VASCULATURE: There are moderate calcified atherosclerotic changes of  the visualized aorta and its branches. There is no evidence of aortic  aneurysm.     PELVIC ORGANS: No pelvic masses.     OTHER: No free air or free fluid.     MUSCULOSKELETAL: No frankly destructive bony lesions.                                                                      IMPRESSION:   1.  5 mm nonobstructing stone left kidney.  2.  No right-sided stones.     NELSON HANNAH MD         SYSTEM ID:  [id number]         I reviewed the CT images and compared the CT to the 2018 CT  There appears to be a slight increase in the size of the stone but it appears to be nonobstructing and has not changed in position significantly  Kidney cyst does have an increase in size along with some change in the density of the cyst.  This might indicate possible  bleeding into the cyst.  But with the lack of contrast it is difficult to interpret         Assessment and Plan:     Kidney stone  No evidence of any obstruction  Recommended NSAIDs for management of the pain  Possibly with recent increase which could be related to the cyst there may be some benefit reducing the inflammation  - Adult Urology  Referral  - ketorolac (TORADOL) 10 MG tablet; Take 1 tablet (10 mg) by mouth every 6 hours as needed for moderate pain.    Acquired cyst of kidney  Recommended that we manage this conservatively for this time  If his pain becomes worse or if he starts experiencing any fever chills or shaking would recommend repeat CT with contrast to image  cysts better  This will also help us ruling out any concern of infection/abscess within the cyst  He agrees and but explained to him based on the imaging and his clinical symptoms there is low likelihood of having an infected cyst or a renal abscess at this time  I also recommended that he talk to his primary care if his pain remains constant to consider for alternate causes of his left flank pain  - ketorolac  (TORADOL) 10 MG tablet; Take 1 tablet (10 mg) by mouth every 6 hours as needed for moderate pain.      Plan:  Follow-up as needed if pain persists or worsens for repeat imaging with contrast  Kidney stone prevention plan discussed  Orders  No orders of the defined types were placed in this encounter.      Filipe Graves MD  [facility]      ==========================    Additional Billing and Coding Information:  Review of external notes as documented above   Review of the result(s) of each unique test - CT abdomen pelvis UA, creatinine, PSA                25 minutes spent by me on the date of the encounter doing chart review, review of test results, interpretation of tests, patient visit, and documentation     ==========================

## 2024-11-13 NOTE — PATIENT INSTRUCTIONS
No evidence of hydronephrosis,   Cyst increase in size with change in density  Would recommend CT with contrast if pain worsens  Toradol as needed for the pain and follow-up as needed

## 2024-11-21 LAB — BACTERIA SPEC CULT: NORMAL

## 2024-11-26 LAB — BACTERIA SPEC CULT: NO GROWTH

## 2025-03-05 ENCOUNTER — HOSPITAL ENCOUNTER (OUTPATIENT)
Dept: CARDIOLOGY | Facility: CLINIC | Age: 80
Discharge: HOME | End: 2025-03-05
Attending: INTERNAL MEDICINE
Payer: COMMERCIAL

## 2025-03-05 DIAGNOSIS — I25.10 CORONARY ARTERY DISEASE INVOLVING NATIVE CORONARY ARTERY OF NATIVE HEART WITHOUT ANGINA PECTORIS: ICD-10-CM

## 2025-03-05 LAB — LVEF ECHO: NORMAL

## 2025-03-05 PROCEDURE — 999N000208 ECHOCARDIOGRAM COMPLETE

## 2025-03-05 PROCEDURE — 255N000002 HC RX 255 OP 636: Performed by: INTERNAL MEDICINE

## 2025-03-05 RX ADMIN — HUMAN ALBUMIN MICROSPHERES AND PERFLUTREN 3 ML: 10; .22 INJECTION, SOLUTION INTRAVENOUS at 09:08

## 2025-03-10 ENCOUNTER — TRANSFERRED RECORDS (OUTPATIENT)
Dept: HEALTH INFORMATION MANAGEMENT | Facility: CLINIC | Age: 80
End: 2025-03-10
Payer: COMMERCIAL

## 2025-03-11 ENCOUNTER — LAB (OUTPATIENT)
Dept: LAB | Facility: CLINIC | Age: 80
End: 2025-03-11
Payer: COMMERCIAL

## 2025-03-11 DIAGNOSIS — I25.10 CORONARY ARTERY DISEASE INVOLVING NATIVE CORONARY ARTERY OF NATIVE HEART WITHOUT ANGINA PECTORIS: ICD-10-CM

## 2025-03-11 LAB
ALT SERPL W P-5'-P-CCNC: 15 U/L (ref 0–70)
CHOLEST SERPL-MCNC: 133 MG/DL
FASTING STATUS PATIENT QL REPORTED: YES
HDLC SERPL-MCNC: 45 MG/DL
LDLC SERPL CALC-MCNC: 50 MG/DL
NONHDLC SERPL-MCNC: 88 MG/DL
TRIGL SERPL-MCNC: 191 MG/DL

## 2025-03-20 ENCOUNTER — OFFICE VISIT (OUTPATIENT)
Dept: CARDIOLOGY | Facility: CLINIC | Age: 80
End: 2025-03-20
Payer: COMMERCIAL

## 2025-03-20 VITALS
HEIGHT: 74 IN | SYSTOLIC BLOOD PRESSURE: 132 MMHG | BODY MASS INDEX: 32.08 KG/M2 | DIASTOLIC BLOOD PRESSURE: 78 MMHG | WEIGHT: 250 LBS | HEART RATE: 62 BPM

## 2025-03-20 DIAGNOSIS — R09.89 BILATERAL CAROTID BRUITS: Primary | ICD-10-CM

## 2025-03-20 NOTE — LETTER
3/20/2025    Michael Oliver MD  [address] [place] [address]    RE: Hood Arizmendi       Dear Colleague,     I had the pleasure of seeing Hood Arizmendi in the [facility].        Cardiology    I had the pleasure to follow up with your patient, Mr. Hood Arizmendi, in the Cardiovascular Medicine Clinic.  As you recall, he is a very pleasant 79-year-old gentleman who I got acquainted with when he moved to the [place] area.  He has known coronary artery disease, namely small vessel disease.  The posterolateral branch is 100% occluded with moderate disease elsewhere.  His coronary angiogram was performed in 2016, which confirms these findings.         From a cardiac perspective, this gentleman is doing well.  He has not noticed any chest pain, PND, orthopnea, syncope or any other cardiac related concerns.    He has intentionally lost over 20 pounds over the past year.  Here for his routine follow-up visit.    Echo 2025  1. The left ventricle is normal in size. There is normal left ventricular wall  thickness. Left ventricular systolic function is normal. The visual ejection  fraction is 60-65%. No regional wall motion abnormalities noted. There is no  thrombus seen in the left ventricle.  2. The right ventricle is normal size. The right ventricular systolic function  is normal.  3. Trace mitral and tricuspid regurgitation.  4. No pericardial effusion.  5. In comparison to the previous report dated 10/31/2022, the findings are  similar.      Carotid US: 2024    Right side:      Plaque Morphology: Mild, homogenous        Proximal CCA: 107 cm/sec     Distal CCA: 85 cm/sec     ICA: 67/26  cm/sec        External CA: 66 cm/sec     Vertebral artery: Antegrade flow 28  cm/sec      ICA/CCA ratio: 0.79      Left side:      Plaque: Mild, homogenous.          Proximal CCA: 85 cm/sec     Distal CCA: 78 cm/sec     ICA: 62/17  cm/sec        External CA: 69 cm/sec     Vertebral artery: Antegrade flow  24  cm/sec      ICA/CCA ratio: 0.79        PHYSICAL EXAMINATION:   VITAL SIGNS:  There were no vitals taken for this visit.    GENERAL: Healthy, alert and no distress  EYES: Eyes grossly normal to inspection.  No discharge or erythema, or obvious scleral/conjunctival abnormalities.  RESP: No audible wheeze, cough, or visible cyanosis.  No visible retractions or increased work of breathing.    SKIN: Visible skin clear. No significant rash, abnormal pigmentation or lesions.  NEURO: Cranial nerves grossly intact.  Mentation and speech appropriate for age.  PSYCH: Mentation appears normal, affect normal/bright, judgement and insight intact, normal speech and appearance well-groomed.         ASSESSMENT:   1.  Chest discomfort with known single-vessel occlusive coronary artery disease, namely ostial posterolateral branch with ipsilateral collateral filling by angiogram in 2016. Resolved  2.  Mild disease elsewhere.   3.  Normal LV systolic function.   4.  Mild carotid artery stenosis.   5.  Eye surgery    6.  Hypertension.   7.  Hyperlipidemia.   8.  Prostate Ca S/P radiotherapy     RECOMMENDATIONS:   1.  Atherosclerotic coronary artery disease, stable.  He is on appropriate guideline-directed medical therapy.   No return of symptoms.  Echocardiogram reviewed and was stable  2.  Peripheral arterial disease.  Follow-up carotid ultrasound next year.  3.  Hypertension: Continue with his multidrug regimen and antianginal therapy.  4.  Hyperlipidemia.  Let us continue with statin therapy.  LDL is at goal from this years check.  Continue with statin.    5.  Patient is overall doing well.  He was active on his cruise in [place].  No symptoms.  6.  Patient return to clinic in 1 years time with pre-clinic lab work and an carotid ultrasound.        The longitudinal plan of care for the diagnosis(es)/condition(s) as documented were addressed during this visit. Due to the added complexity in care, I will continue to support Harrison in  the subsequent management and with ongoing continuity of care.         Charlotte Kay MD              Thank you for allowing me to participate in the care of your patient.      Sincerely,     Charlotte Kay MD     [facility]  cc:   Michael Oliver MD  [address] [place] [address]

## 2025-03-20 NOTE — PROGRESS NOTES
Cardiology    I had the pleasure to follow up with your patient, Mr. Hood Arizmendi, in the Cardiovascular Medicine Clinic.  As you recall, he is a very pleasant 79-year-old gentleman who I got acquainted with when he moved to the [place] area.  He has known coronary artery disease, namely small vessel disease.  The posterolateral branch is 100% occluded with moderate disease elsewhere.  His coronary angiogram was performed in 2016, which confirms these findings.         From a cardiac perspective, this gentleman is doing well.  He has not noticed any chest pain, PND, orthopnea, syncope or any other cardiac related concerns.    He has intentionally lost over 20 pounds over the past year.  Here for his routine follow-up visit.    Echo 2025  1. The left ventricle is normal in size. There is normal left ventricular wall  thickness. Left ventricular systolic function is normal. The visual ejection  fraction is 60-65%. No regional wall motion abnormalities noted. There is no  thrombus seen in the left ventricle.  2. The right ventricle is normal size. The right ventricular systolic function  is normal.  3. Trace mitral and tricuspid regurgitation.  4. No pericardial effusion.  5. In comparison to the previous report dated 10/31/2022, the findings are  similar.      Carotid US: 2024    Right side:      Plaque Morphology: Mild, homogenous        Proximal CCA: 107 cm/sec     Distal CCA: 85 cm/sec     ICA: 67/26  cm/sec        External CA: 66 cm/sec     Vertebral artery: Antegrade flow 28  cm/sec      ICA/CCA ratio: 0.79      Left side:      Plaque: Mild, homogenous.          Proximal CCA: 85 cm/sec     Distal CCA: 78 cm/sec     ICA: 62/17  cm/sec        External CA: 69 cm/sec     Vertebral artery: Antegrade flow 24  cm/sec      ICA/CCA ratio: 0.79        PHYSICAL EXAMINATION:   VITAL SIGNS:  There were no vitals taken for this visit.    GENERAL: Healthy, alert and no distress  EYES: Eyes grossly normal to  inspection.  No discharge or erythema, or obvious scleral/conjunctival abnormalities.  RESP: No audible wheeze, cough, or visible cyanosis.  No visible retractions or increased work of breathing.    SKIN: Visible skin clear. No significant rash, abnormal pigmentation or lesions.  NEURO: Cranial nerves grossly intact.  Mentation and speech appropriate for age.  PSYCH: Mentation appears normal, affect normal/bright, judgement and insight intact, normal speech and appearance well-groomed.         ASSESSMENT:   1.  Chest discomfort with known single-vessel occlusive coronary artery disease, namely ostial posterolateral branch with ipsilateral collateral filling by angiogram in 2016. Resolved  2.  Mild disease elsewhere.   3.  Normal LV systolic function.   4.  Mild carotid artery stenosis.   5.  Eye surgery    6.  Hypertension.   7.  Hyperlipidemia.   8.  Prostate Ca S/P radiotherapy     RECOMMENDATIONS:   1.  Atherosclerotic coronary artery disease, stable.  He is on appropriate guideline-directed medical therapy.   No return of symptoms.  Echocardiogram reviewed and was stable  2.  Peripheral arterial disease.  Follow-up carotid ultrasound next year.  3.  Hypertension: Continue with his multidrug regimen and antianginal therapy.  4.  Hyperlipidemia.  Let us continue with statin therapy.  LDL is at goal from this years check.  Continue with statin.    5.  Patient is overall doing well.  He was active on his cruise in [place].  No symptoms.  6.  Patient return to clinic in 1 years time with pre-clinic lab work and an carotid ultrasound.        The longitudinal plan of care for the diagnosis(es)/condition(s) as documented were addressed during this visit. Due to the added complexity in care, I will continue to support Harrison in the subsequent management and with ongoing continuity of care.         Charlotte Kay MD

## 2025-04-12 ENCOUNTER — HEALTH MAINTENANCE LETTER (OUTPATIENT)
Age: 80
End: 2025-04-12

## 2025-05-14 DIAGNOSIS — I25.10 CORONARY ARTERY DISEASE INVOLVING NATIVE CORONARY ARTERY OF NATIVE HEART WITHOUT ANGINA PECTORIS: ICD-10-CM

## 2025-05-14 RX ORDER — METOPROLOL SUCCINATE 25 MG/1
25 TABLET, EXTENDED RELEASE ORAL
Qty: 90 TABLET | Refills: 3 | Status: SHIPPED | OUTPATIENT
Start: 2025-05-14

## 2025-06-10 SDOH — HEALTH STABILITY: PHYSICAL HEALTH: ON AVERAGE, HOW MANY DAYS PER WEEK DO YOU ENGAGE IN MODERATE TO STRENUOUS EXERCISE (LIKE A BRISK WALK)?: 3 DAYS

## 2025-06-10 SDOH — HEALTH STABILITY: PHYSICAL HEALTH: ON AVERAGE, HOW MANY MINUTES DO YOU ENGAGE IN EXERCISE AT THIS LEVEL?: 30 MIN

## 2025-06-10 ASSESSMENT — SOCIAL DETERMINANTS OF HEALTH (SDOH): HOW OFTEN DO YOU GET TOGETHER WITH FRIENDS OR RELATIVES?: TWICE A WEEK

## 2025-06-12 ENCOUNTER — OFFICE VISIT (OUTPATIENT)
Dept: PEDIATRICS | Facility: CLINIC | Age: 80
End: 2025-06-12
Attending: INTERNAL MEDICINE
Payer: COMMERCIAL

## 2025-06-12 ENCOUNTER — RESULTS FOLLOW-UP (OUTPATIENT)
Dept: PEDIATRICS | Facility: CLINIC | Age: 80
End: 2025-06-12

## 2025-06-12 VITALS
HEART RATE: 62 BPM | TEMPERATURE: 98.7 F | RESPIRATION RATE: 18 BRPM | WEIGHT: 247 LBS | BODY MASS INDEX: 31.7 KG/M2 | DIASTOLIC BLOOD PRESSURE: 80 MMHG | HEIGHT: 74 IN | SYSTOLIC BLOOD PRESSURE: 126 MMHG | OXYGEN SATURATION: 97 %

## 2025-06-12 DIAGNOSIS — G47.33 OSA (OBSTRUCTIVE SLEEP APNEA): ICD-10-CM

## 2025-06-12 DIAGNOSIS — I25.10 CORONARY ARTERY DISEASE INVOLVING NATIVE CORONARY ARTERY OF NATIVE HEART WITHOUT ANGINA PECTORIS: ICD-10-CM

## 2025-06-12 DIAGNOSIS — K51.90 ULCERATIVE COLITIS WITHOUT COMPLICATIONS, UNSPECIFIED LOCATION (H): ICD-10-CM

## 2025-06-12 DIAGNOSIS — I10 ESSENTIAL HYPERTENSION: ICD-10-CM

## 2025-06-12 DIAGNOSIS — Z00.00 ENCOUNTER FOR MEDICARE ANNUAL WELLNESS EXAM: ICD-10-CM

## 2025-06-12 DIAGNOSIS — M62.9 DISORDER OF MUSCLE, UNSPECIFIED: ICD-10-CM

## 2025-06-12 DIAGNOSIS — I73.9 PAD (PERIPHERAL ARTERY DISEASE): ICD-10-CM

## 2025-06-12 DIAGNOSIS — C61 PROSTATE CANCER (H): ICD-10-CM

## 2025-06-12 DIAGNOSIS — Z79.899 MEDICATION MANAGEMENT: Primary | ICD-10-CM

## 2025-06-12 DIAGNOSIS — H35.3230 BILATERAL EXUDATIVE AGE-RELATED MACULAR DEGENERATION, UNSPECIFIED STAGE (H): ICD-10-CM

## 2025-06-12 DIAGNOSIS — N40.1 BENIGN PROSTATIC HYPERPLASIA WITH LOWER URINARY TRACT SYMPTOMS, SYMPTOM DETAILS UNSPECIFIED: ICD-10-CM

## 2025-06-12 DIAGNOSIS — R29.898 WEAKNESS OF BOTH LOWER EXTREMITIES: ICD-10-CM

## 2025-06-12 PROBLEM — H35.3290 AMD (AGE-RELATED MACULAR DEGENERATION), WET (H): Status: RESOLVED | Noted: 2018-05-01 | Resolved: 2025-06-12

## 2025-06-12 PROBLEM — R97.20 ELEVATED PROSTATE SPECIFIC ANTIGEN (PSA): Status: RESOLVED | Noted: 2018-06-30 | Resolved: 2025-06-12

## 2025-06-12 LAB
ALBUMIN SERPL BCG-MCNC: 4.9 G/DL (ref 3.5–5.2)
ALP SERPL-CCNC: 70 U/L (ref 40–150)
ALT SERPL W P-5'-P-CCNC: 17 U/L (ref 0–70)
ANION GAP SERPL CALCULATED.3IONS-SCNC: 14 MMOL/L (ref 7–15)
AST SERPL W P-5'-P-CCNC: 25 U/L (ref 0–45)
BILIRUB SERPL-MCNC: 0.6 MG/DL
BUN SERPL-MCNC: 15.8 MG/DL (ref 8–23)
CALCIUM SERPL-MCNC: 10 MG/DL (ref 8.8–10.4)
CHLORIDE SERPL-SCNC: 103 MMOL/L (ref 98–107)
CK SERPL-CCNC: 65 U/L (ref 39–308)
CREAT SERPL-MCNC: 0.93 MG/DL (ref 0.67–1.17)
EGFRCR SERPLBLD CKD-EPI 2021: 84 ML/MIN/1.73M2
ERYTHROCYTE [SEDIMENTATION RATE] IN BLOOD BY WESTERGREN METHOD: 8 MM/HR (ref 0–20)
EST. AVERAGE GLUCOSE BLD GHB EST-MCNC: 120 MG/DL
GLUCOSE SERPL-MCNC: 107 MG/DL (ref 70–99)
HBA1C MFR BLD: 5.8 % (ref 0–5.6)
HCO3 SERPL-SCNC: 24 MMOL/L (ref 22–29)
POTASSIUM SERPL-SCNC: 4.4 MMOL/L (ref 3.4–5.3)
PROT SERPL-MCNC: 7.5 G/DL (ref 6.4–8.3)
PSA SERPL DL<=0.01 NG/ML-MCNC: 0.15 NG/ML (ref 0–6.5)
SODIUM SERPL-SCNC: 141 MMOL/L (ref 135–145)
TSH SERPL DL<=0.005 MIU/L-ACNC: 1.88 UIU/ML (ref 0.3–4.2)
VIT B12 SERPL-MCNC: 309 PG/ML (ref 232–1245)

## 2025-06-12 RX ORDER — MIRTAZAPINE 15 MG/1
TABLET, FILM COATED ORAL
COMMUNITY
Start: 2024-03-25

## 2025-06-12 RX ORDER — TAMSULOSIN HYDROCHLORIDE 0.4 MG/1
0.4 CAPSULE ORAL DAILY
Qty: 90 CAPSULE | Refills: 3 | Status: SHIPPED | OUTPATIENT
Start: 2025-06-12

## 2025-06-12 RX ORDER — ROSUVASTATIN CALCIUM 20 MG/1
20 TABLET, COATED ORAL DAILY
Qty: 90 TABLET | Refills: 3 | Status: SHIPPED | OUTPATIENT
Start: 2025-06-12

## 2025-06-12 RX ORDER — AMLODIPINE BESYLATE 2.5 MG/1
2.5 TABLET ORAL DAILY
Qty: 90 TABLET | Refills: 3 | Status: SHIPPED | OUTPATIENT
Start: 2025-06-12

## 2025-06-12 RX ORDER — BRIMONIDINE TARTRATE 2 MG/ML
SOLUTION/ DROPS OPHTHALMIC
COMMUNITY
Start: 2025-05-13

## 2025-06-12 ASSESSMENT — PAIN SCALES - GENERAL: PAINLEVEL_OUTOF10: NO PAIN (0)

## 2025-06-12 NOTE — PROGRESS NOTES
Preventive Care Visit  [facility] EDWARD Oliver MD, Internal Medicine - Pediatrics  Jun 12, 2025      Assessment & Plan     Medication management      Bilateral exudative age-related macular degeneration, unspecified stage (H)  Management per ophthalmology.    Ulcerative colitis without complications, unspecified location (H)  Management per gastroenterology.     Prostate cancer (H)  Yearly prostate specific antigen (PSA) here.   - PSA, tumor marker; Future  - PSA, tumor marker    Encounter for Medicare annual wellness exam  Discussed diet, exercise, testicular self exam, blood pressure, cholesterol, and need for cancer surveillance at appropriate ages.    Appropriate preventive services were addressed with this patient via screening, questionnaire, or discussion as appropriate for fall prevention, nutrition, physical activity, Tobacco-use cessation, social engagement, weight loss and cognition.  Checklist reviewing preventive services available has been given to the patient.  Reviewed patient's diet, addressing concerns and/or questions.     - Comprehensive metabolic panel (BMP + Alb, Alk Phos, ALT, AST, Total. Bili, TP); Future  - Hemoglobin A1c; Future  - Comprehensive metabolic panel (BMP + Alb, Alk Phos, ALT, AST, Total. Bili, TP)  - Hemoglobin A1c    PAD (peripheral artery disease)  No signs of low pulses today.  Medical management.     DARYL (obstructive sleep apnea)  Ongoing continuous positive airway pressure .     Essential hypertension  At goal.     Coronary artery disease involving native coronary artery of native heart without angina pectoris  Secondary risk factor modification.   - amLODIPine (NORVASC) 2.5 MG tablet; Take 1 tablet (2.5 mg) by mouth daily.  - rosuvastatin (CRESTOR) 20 MG tablet; Take 1 tablet (20 mg) by mouth daily.    Weakness of both lower extremities  Need to r/o pseudoclaudication; unlikely PAD related (normal pulses).  ? Mypathic pain; he may stop his statin 1  "month to see if symptoms improve.   - CK total; Future  - MR Lumbar Spine w/o Contrast; Future  - TSH with free T4 reflex; Future  - Vitamin B12; Future  - ESR: Erythrocyte sedimentation rate; Future  - CK total  - TSH with free T4 reflex  - Vitamin B12  - ESR: Erythrocyte sedimentation rate    Disorder of muscle, unspecified    - TSH with free T4 reflex; Future  - TSH with free T4 reflex    Benign prostatic hyperplasia with lower urinary tract symptoms, symptom details unspecified    - tamsulosin (FLOMAX) 0.4 MG capsule; Take 1 capsule (0.4 mg) by mouth daily.    Patient has been advised of split billing requirements and indicates understanding: Yes        BMI  Estimated body mass index is 32.15 kg/m  as calculated from the following:    Height as of this encounter: 1.867 m (6' 1.5\").    Weight as of this encounter: 112 kg (247 lb).   Weight management plan: Discussed healthy diet and exercise guidelines    Counseling  Appropriate preventive services were addressed with this patient via screening, questionnaire, or discussion as appropriate for fall prevention, nutrition, physical activity, Tobacco-use cessation, social engagement, weight loss and cognition.  Checklist reviewing preventive services available has been given to the patient.  Reviewed patient's diet, addressing concerns and/or questions.   He is at risk for lack of exercise and has been provided with information to increase physical activity for the benefit of his well-being.   He is at risk for psychosocial distress and has been provided with information to reduce risk.   Discussed possible causes of fatigue. Patient reported safety concerns were addressed today.The patient was provided with written information regarding signs of hearing loss.           Subjective   Harrison is a 80 year old, presenting for the following:  Physical        6/12/2025     1:07 PM   Additional Questions   Roomed by scotty mckeon   Accompanied by self          HPI    Lets \"Feel " "dead.\"  Can walk 1/4 mil, then thighs feel weak; not painful.  No falls.      No bowel issues with respect to colitis.    Would like prostate specific antigen (PSA).  No longer seeing urology.  Had proton beam therapy 2018 .     Cardiology:  no new changes.              Advance Care Planning    Discussed advance care planning with patient; informed AVS has link to [employer].        6/10/2025   General Health   How would you rate your overall physical health? Good   Feel stress (tense, anxious, or unable to sleep) To some extent   (!) STRESS CONCERN      6/10/2025   Nutrition   Diet: Regular (no restrictions)         6/10/2025   Exercise   Days per week of moderate/strenous exercise 3 days   Average minutes spent exercising at this level 30 min         6/10/2025   Social Factors   Frequency of gathering with friends or relatives Twice a week   Worry food won't last until get money to buy more No   Food not last or not have enough money for food? No   Do you have housing? (Housing is defined as stable permanent housing and does not include staying outside in a car, in a tent, in an abandoned building, in an overnight shelter, or couch-surfing.) Yes   Are you worried about losing your housing? No   Lack of transportation? No   Unable to get utilities (heat,electricity)? No         6/10/2025   Fall Risk   Fallen 2 or more times in the past year? No   Trouble with walking or balance? No          6/10/2025   Activities of Daily Living- Home Safety   Needs help with the following daily activites None of the above   Safety concerns in the home No grab bars in the bathroom         6/10/2025   Dental   Dentist two times every year? Yes         6/10/2025   Hearing Screening   Hearing concerns? (!) IT'S HARD TO FOLLOW A CONVERSATION IN A NOISY RESTAURANT OR CROWDED ROOM.         6/10/2025   Driving Risk Screening   Patient/family members have concerns about driving No         6/10/2025   General Alertness/Fatigue " Screening   Have you been more tired than usual lately? (!) YES         6/10/2025   Urinary Incontinence Screening   Bothered by leaking urine in past 6 months No         Today's PHQ-2 Score:       6/12/2025     1:05 PM   PHQ-2 ( 1999 [employer])   Q1: Little interest or pleasure in doing things 0   Q2: Feeling down, depressed or hopeless 0   PHQ-2 Score 0    Q1: Little interest or pleasure in doing things Not at all   Q2: Feeling down, depressed or hopeless Not at all   PHQ-2 Score 0       Patient-reported           6/10/2025   Substance Use   Alcohol more than 3/day or more than 7/wk No   Do you have a current opioid prescription? No   How severe/bad is pain from 1 to 10? 0/10 (No Pain)   Do you use any other substances recreationally? (!) CANNABIS PRODUCTS    (!) PRESCRIPTION DRUGS       Multiple values from one day are sorted in reverse-chronological order     Social History     Tobacco Use    Smoking status: Never     Passive exposure: Never    Smokeless tobacco: Never   Vaping Use    Vaping status: Never Used   Substance Use Topics    Alcohol use: Yes     Types: 7 Standard drinks or equivalent per week     Comment: 1 per week    Drug use: Yes     Types: Marijuana     Comment: nightly help them sleep       ASCVD Risk   The ASCVD Risk score (Joslyn ROUSE, et al., 2019) failed to calculate for the following reasons:    Risk score cannot be calculated because patient has a medical history suggesting prior/existing ASCVD            Reviewed and updated as needed this visit by Provider                    Past Medical History:   Diagnosis Date    Arthritis 2012    right hip    CAD (coronary artery disease)     small vessel disease    Cancer (H) Prostate    Carotid artery stenosis     Chronic stable angina     HTN (hypertension)     Hyperlipidemia     Mumps     DARYL (obstructive sleep apnea)     Prostate infection May 2018    High PSA     Past Surgical History:   Procedure Laterality Date    ABDOMEN SURGERY       APPENDECTOMY      APPENDECTOMY      BIOPSY  1980 on    colonoscopies    CARDIAC SURGERY  May 2011    Cardiac Cath    COLONOSCOPY  1980 on    CORONARY ANGIOGRAPHY ADULT ORDER  2011, 2016    med tx, small vessels    CYSTOSCOPY      EYE SURGERY  03/2017    retinal detachment     EYE SURGERY      lasix    HEAD & NECK SURGERY  Aug 11th & 19th, 2016    retina eye surgeries    NOSE SURGERY      SURGICAL HISTORY OF -       tonsils    SURGICAL HISTORY OF -       cystoscopy for kidney stones    SURGICAL HISTORY OF -       nasal surgery    TONSILLECTOMY       Current providers sharing in care for this patient include:  Patient Care Team:  Michael Oliver MD as PCP - General (Internal Medicine - Pediatrics)  Michael Oliver MD as Assigned PCP  William Terrell MD as MD (Urology)  Anand Maxwell MD as MD (Internal Medicine - Sleep Medicine)  Charlotte Kay MD as Assigned Heart and Vascular Provider  Mee Rosa [facility] as Assigned MTM Pharmacist  Filipe Graves MD as MD (Urology)  Filipe Graves MD as Assigned Surgical Provider    The following health maintenance items are reviewed in Epic and correct as of today:  Health Maintenance   Topic Date Due    Medicare Annual MTM Pharmacist Visit (once per calendar year)  01/01/2025    ANNUAL REVIEW OF HM ORDERS  03/27/2025    COVID-19 VACCINE (9 - Moderna risk 2024-25 season) 04/18/2025    MEDICARE ANNUAL WELLNESS VISIT  06/05/2025    BMP  10/29/2025    LIPID  03/11/2026    FALL RISK ASSESSMENT  06/12/2026    DIABETES SCREENING  10/29/2027    DTAP/TDAP/TD VACCINE (3 - Td or Tdap) 05/15/2028    ADVANCE CARE PLANNING  06/05/2029    HEPATITIS C SCREENING  Completed    PHQ-2 (once per calendar year)  Completed    INFLUENZA VACCINE  Completed    PNEUMOCOCCAL VACCINE 50+ YEARS  Completed    ZOSTER VACCINE  Completed    RSV VACCINE  Completed    HPV VACCINE  Aged Out    MENINGITIS VACCINE  Aged Out    COLORECTAL CANCER SCREENING   "Discontinued         Review of Systems  Constitutional, HEENT, cardiovascular, pulmonary, GI, , musculoskeletal, neuro, skin, endocrine and psych systems are negative, except as otherwise noted.     Objective    Exam  /80   Pulse 62   Temp 98.7  F (37.1  C) (Tympanic)   Resp 18   Ht 1.867 m (6' 1.5\")   Wt 112 kg (247 lb)   SpO2 97%   BMI 32.15 kg/m     Estimated body mass index is 32.15 kg/m  as calculated from the following:    Height as of this encounter: 1.867 m (6' 1.5\").    Weight as of this encounter: 112 kg (247 lb).    Physical Exam  GENERAL: alert and no distress  EYES: Eyes grossly normal to inspection, PERRL and conjunctivae and sclerae normal  HENT: ear canals and TM's normal, nose and mouth without ulcers or lesions  NECK: no adenopathy, no asymmetry, masses, or scars  RESP: lungs clear to auscultation - no rales, rhonchi or wheezes  CV: regular rate and rhythm, normal S1 S2, no S3 or S4, no murmur, click or rub, no peripheral edema  ABDOMEN: soft, nontender, no hepatosplenomegaly, no masses and bowel sounds normal   (male): normal male genitalia without lesions or urethral discharge, no hernia  MS: no gross musculoskeletal defects noted, no edema  SKIN: no suspicious lesions or rashes  NEURO: Normal strength and tone, mentation intact and speech normal  PSYCH: mentation appears normal, affect normal/bright        6/12/2025   Mini Cog   Clock Draw Score 2 Normal   3 Item Recall 3 objects recalled   Mini Cog Total Score 5              Signed Electronically by: Michael Oliver MD    "

## 2025-06-12 NOTE — PATIENT INSTRUCTIONS
"Get your FLU and COVID this fall.    Lab work today:  We can do labs in the exam room today, or you can get them done downstairs in the lab.      If you are going downstairs:  Directions:  As you walk through the first floor, you'll see (on the right) first the pharmacy, then some bathrooms, then the \"Lab and Imaging\" area. Give them your name at the window there and wait for them to call you.     They will call you for an MRI of your lumbar spine.     We could consider a trial off your crestor for 1-2 months to see if your leg weakness improves.  If not, then resume.      In meantime, lots of exercise, keep using your legs.  No evidence of blood flow problems.    Refills given today.           Patient Education   Preventive Care Advice   This is general advice given by our system to help you stay healthy. However, your care team may have specific advice just for you. Please talk to your care team about your preventive care needs.  Nutrition  Eat 5 or more servings of fruits and vegetables each day.  Try wheat bread, brown rice and whole grain pasta (instead of white bread, rice, and pasta).  Get enough calcium and vitamin D. Check the label on foods and aim for 100% of the RDA (recommended daily allowance).  Lifestyle  Exercise at least 150 minutes each week  (30 minutes a day, 5 days a week).  Do muscle strengthening activities 2 days a week. These help control your weight and prevent disease.  No smoking.  Wear sunscreen to prevent skin cancer.  Have a dental exam and cleaning every 6 months.  Yearly exams  See your health care team every year to talk about:  Any changes in your health.  Any medicines your care team has prescribed.  Preventive care, family planning, and ways to prevent chronic diseases.  Shots (vaccines)   HPV shots (up to age 26), if you've never had them before.  Hepatitis B shots (up to age 59), if you've never had them before.  COVID-19 shot: Get this shot when it's due.  Flu shot: Get a flu " shot every year.  Tetanus shot: Get a tetanus shot every 10 years.  Pneumococcal, hepatitis A, and RSV shots: Ask your care team if you need these based on your risk.  Shingles shot (for age 50 and up)  General health tests  Diabetes screening:  Starting at age 35, Get screened for diabetes at least every 3 years.  If you are younger than age 35, ask your care team if you should be screened for diabetes.  Cholesterol test: At age 39, start having a cholesterol test every 5 years, or more often if advised.  Bone density scan (DEXA): At age 50, ask your care team if you should have this scan for osteoporosis (brittle bones).  Hepatitis C: Get tested at least once in your life.  STIs (sexually transmitted infections)  Before age 24: Ask your care team if you should be screened for STIs.  After age 24: Get screened for STIs if you're at risk. You are at risk for STIs (including HIV) if:  You are sexually active with more than one person.  You don't use condoms every time.  You or a partner was diagnosed with a sexually transmitted infection.  If you are at risk for HIV, ask about PrEP medicine to prevent HIV.  Get tested for HIV at least once in your life, whether you are at risk for HIV or not.  Cancer screening tests  Cervical cancer screening: If you have a cervix, begin getting regular cervical cancer screening tests starting at age 21.  Breast cancer scan (mammogram): If you've ever had breasts, begin having regular mammograms starting at age 40. This is a scan to check for breast cancer.  Colon cancer screening: It is important to start screening for colon cancer at age 45.  Have a colonoscopy test every 10 years (or more often if you're at risk) Or, ask your provider about stool tests like a FIT test every year or Cologuard test every 3 years.  To learn more about your testing options, visit:   .  For help making a decision, visit:   https://bit.ly/ld68125.  Prostate cancer screening test: If you have a prostate,  ask your care team if a prostate cancer screening test (PSA) at age 55 is right for you.  Lung cancer screening: If you are a current or former smoker ages 50 to 80, ask your care team if ongoing lung cancer screenings are right for you.  For informational purposes only. Not to replace the advice of your health care provider. Copyright   2023 [facility]. All rights reserved. Clinically reviewed by the [facility] Transitions Program. Podclass [id number] - REV 01/24.  Learning About Activities of Daily Living  What are activities of daily living?     Activities of daily living (ADLs) are the basic self-care tasks you do every day. These include eating, bathing, dressing, and moving around.  As you age, and if you have health problems, you may find that it's harder to do some of these tasks. If so, your doctor can suggest ideas that may help.  To measure what kind of help you may need, your doctor will ask how well you are able to do ADLs. Let your doctor know if there are any tasks that you are having trouble doing. This is an important first step to getting help. And when you have the help you need, you can stay as independent as possible.  How will a doctor assess your ADLs?  Asking about ADLs is part of a routine health checkup your doctor will likely do as you age. Your health check might be done in a doctor's office, in your home, or at a hospital. The goal is to find out if you are having any problems that could make it hard to care for yourself or that make it unsafe for you to be on your own.  To measure your ADLs, your doctor will ask how hard it is for you to do routine tasks. Your doctor may also want to know if you have changed the way you do a task because of a health problem. Your doctor may watch how you:  Walk back and forth.  Keep your balance while you stand or walk.  Move from sitting to standing or from a bed to a chair.  Button or unbutton a shirt or sweater.  Remove and put on  your shoes.  It's common to feel a little worried or anxious if you find you can't do all the things you used to be able to do. Talking with your doctor about ADLs is a way to make sure you're as safe as possible and able to care for yourself as well as you can. You may want to bring a caregiver, friend, or family member to your checkup. They can help you talk to your doctor.  Follow-up care is a key part of your treatment and safety. Be sure to make and go to all appointments, and call your doctor if you are having problems. It's also a good idea to know your test results and keep a list of the medicines you take.  Current as of: October 24, 2024  Content Version: 14.5    0465-9893 [employer].   Care instructions adapted under license by your healthcare professional. If you have questions about a medical condition or this instruction, always ask your healthcare professional. [employer] disclaims any warranty or liability for your use of this information.    Hearing Loss: Care Instructions  Overview     Hearing loss is a sudden or slow decrease in how well you hear. It can range from slight to profound. Permanent hearing loss can occur with aging. It also can happen when you are exposed long-term to loud noise. Examples include listening to loud music, riding motorcycles, or being around other loud machines.  Hearing loss can affect your work and home life. It can make you feel lonely or depressed. You may feel that you have lost your independence. But hearing aids and other devices can help you hear better and feel connected to others.  Follow-up care is a key part of your treatment and safety. Be sure to make and go to all appointments, and call your doctor if you are having problems. It's also a good idea to know your test results and keep a list of the medicines you take.  How can you care for yourself at home?  Avoid loud noises whenever possible. This helps keep your hearing from  "getting worse.  Always wear hearing protection around loud noises.  Wear a hearing aid as directed.  A professional can help you pick a hearing aid that will work best for you.  You can also get hearing aids over the counter for mild to moderate hearing loss.  Have hearing tests as your doctor suggests. They can show whether your hearing has changed. Your hearing aid may need to be adjusted.  Use other devices as needed. These may include:  Telephone amplifiers and hearing aids that can connect to a television, stereo, radio, or microphone.  Devices that use lights or vibrations. These alert you to the doorbell, a ringing telephone, or a baby monitor.  Television closed-captioning. This shows the words at the bottom of the screen. Most new TVs can do this.  TTY (text telephone). This lets you type messages back and forth on the telephone instead of talking or listening. These devices are also called TDD. When messages are typed on the keyboard, they are sent over the phone line to a receiving TTY. The message is shown on a monitor.  Use text messaging, social media, and email if it is hard for you to communicate by telephone.  Try to learn a listening technique called speechreading. It is not lipreading. You pay attention to people's gestures, expressions, posture, and tone of voice. These clues can help you understand what a person is saying. Face the person you are talking to, and have them face you. Make sure the lighting is good. You need to see the other person's face clearly.  Think about counseling if you need help to adjust to your hearing loss.  When should you call for help?  Watch closely for changes in your health, and be sure to contact your doctor if:    You think your hearing is getting worse.     You have new symptoms, such as dizziness or nausea.   Where can you learn more?  Go to https://www.healthwise.net/patiented  Enter R798 in the search box to learn more about \"Hearing Loss: Care " "Instructions.\"  Current as of: October 27, 2024  Content Version: 14.5    0093-6823 [employer].   Care instructions adapted under license by your healthcare professional. If you have questions about a medical condition or this instruction, always ask your healthcare professional. [employer] disclaims any warranty or liability for your use of this information.    Learning About Stress  What is stress?     Stress is your body's response to a hard situation. Your body can have a physical, emotional, or mental response. Stress is a fact of life for most people, and it affects everyone differently. What causes stress for you may not be stressful for someone else.  A lot of things can cause stress. You may feel stress when you go on a job interview, take a test, or run a race. This kind of short-term stress is normal and even useful. It can help you if you need to work hard or react quickly. For example, stress can help you finish an important job on time.  Long-term stress is caused by ongoing stressful situations or events. Examples of long-term stress include long-term health problems, ongoing problems at work, or conflicts in your family. Long-term stress can harm your health.  How does stress affect your health?  When you are stressed, your body responds as though you are in danger. It makes hormones that speed up your heart, make you breathe faster, and give you a burst of energy. This is called the fight-or-flight stress response. If the stress is over quickly, your body goes back to normal and no harm is done.  But if stress happens too often or lasts too long, it can have bad effects. Long-term stress can make you more likely to get sick, and it can make symptoms of some diseases worse. If you tense up when you are stressed, you may develop neck, shoulder, or low back pain. Stress is linked to high blood pressure and heart disease.  Stress also harms your emotional health. It can make you " marie, tense, or depressed. Your relationships may suffer, and you may not do well at work or school.  What can you do to manage stress?  You can try these things to help manage stress:   Do something active. Exercise or activity can help reduce stress. Walking is a great way to get started. Even everyday activities such as housecleaning or yard work can help.  Try yoga or parth chi. These techniques combine exercise and meditation. You may need some training at first to learn them.  Do something you enjoy. For example, listen to music or go to a movie. Practice your hobby or do volunteer work.  Meditate. This can help you relax, because you are not worrying about what happened before or what may happen in the future.  Do guided imagery. Imagine yourself in any setting that helps you feel calm. You can use online videos, books, or a teacher to guide you.  Do breathing exercises. For example:  From a standing position, bend forward from the waist with your knees slightly bent. Let your arms dangle close to the floor.  Breathe in slowly and deeply as you return to a standing position. Roll up slowly and lift your head last.  Hold your breath for just a few seconds in the standing position.  Breathe out slowly and bend forward from the waist.  Let your feelings out. Talk, laugh, cry, and express anger when you need to. Talking with supportive friends or family, a counselor, or a mason leader about your feelings is a healthy way to relieve stress. Avoid discussing your feelings with people who make you feel worse.  Write. It may help to write about things that are bothering you. This helps you find out how much stress you feel and what is causing it. When you know this, you can find better ways to cope.  What can you do to prevent stress?  You might try some of these things to help prevent stress:  Manage your time. This helps you find time to do the things you want and need to do.  Get enough sleep. Your body recovers  "from the stresses of the day while you are sleeping.  Get support. Your family, friends, and community can make a difference in how you experience stress.  Limit your news feed. Avoid or limit time on social media or news that may make you feel stressed.  Do something active. Exercise or activity can help reduce stress. Walking is a great way to get started.  Where can you learn more?  Go to https://www.[employer].net/patiented  Enter N032 in the search box to learn more about \"Learning About Stress.\"  Current as of: October 24, 2024  Content Version: 14.5 2024-2025 [employer].   Care instructions adapted under license by your healthcare professional. If you have questions about a medical condition or this instruction, always ask your healthcare professional. [employer] disclaims any warranty or liability for your use of this information.    Learning About Sleeping Well  What does sleeping well mean?     Sleeping well means getting enough sleep to feel good and stay healthy. How much sleep is enough varies among people.  The number of hours you sleep and how you feel when you wake up are both important. If you do not feel refreshed, you probably need more sleep. Another sign of not getting enough sleep is feeling tired during the day.  Experts recommend that adults get at least 7 or more hours of sleep per day. Children and older adults need more sleep.  Why is getting enough sleep important?  Getting enough quality sleep is a basic part of good health. When your sleep suffers, your physical health, mood, and your thoughts can suffer too. You may find yourself feeling more grumpy or stressed. Not getting enough sleep also can lead to serious problems, including injury, accidents, anxiety, and depression.  What might cause poor sleeping?  Many things can cause sleep problems, including:  Changes to your sleep schedule.  Stress. Stress can be caused by fear about a single event, such as " "giving a speech. Or you may have ongoing stress, such as worry about work or school.  Depression, anxiety, and other mental or emotional conditions.  Changes in your sleep habits or surroundings. This includes changes that happen where you sleep, such as noise, light, or sleeping in a different bed. It also includes changes in your sleep pattern, such as having jet lag or working a late shift.  Health problems, such as pain, breathing problems, and restless legs syndrome.  Lack of regular exercise.  Using alcohol, nicotine, or caffeine before bed.  How can you help yourself?  Here are some tips that may help you sleep more soundly and wake up feeling more refreshed.  Your sleeping area   Use your bedroom only for sleeping and sex. A bit of light reading may help you fall asleep. But if it doesn't, do your reading elsewhere in the house. Try not to use your TV, computer, smartphone, or tablet while you are in bed.  Be sure your bed is big enough to stretch out comfortably, especially if you have a sleep partner.  Keep your bedroom quiet, dark, and cool. Use curtains, blinds, or a sleep mask to block out light. To block out noise, use earplugs, soothing music, or a \"white noise\" machine.  Your evening and bedtime routine   Create a relaxing bedtime routine. You might want to take a warm shower or bath, or listen to soothing music.  Go to bed at the same time every night. And get up at the same time every morning, even if you feel tired.  What to avoid   Limit caffeine (coffee, tea, caffeinated sodas) during the day, and don't have any for at least 6 hours before bedtime.  Avoid drinking alcohol before bedtime. Alcohol can cause you to wake up more often during the night.  Try not to smoke or use tobacco, especially in the evening. Nicotine can keep you awake.  Limit naps during the day, especially close to bedtime.  Avoid lying in bed awake for too long. If you can't fall asleep or if you wake up in the middle of the " "night and can't get back to sleep within about 20 minutes, get out of bed and go to another room until you feel sleepy.  Avoid taking medicine right before bed that may keep you awake or make you feel hyper or energized. Your doctor can tell you if your medicine may do this and if you can take it earlier in the day.  If you can't sleep   Imagine yourself in a peaceful, pleasant scene. Focus on the details and feelings of being in a place that is relaxing.  Get up and do a quiet or boring activity until you feel sleepy.  Avoid drinking any liquids before going to bed to help prevent waking up often to use the bathroom.  Where can you learn more?  Go to https://www.[employer].net/patiented  Enter J942 in the search box to learn more about \"Learning About Sleeping Well.\"  Current as of: July 31, 2024  Content Version: 14.5    0449-6885 [employer].   Care instructions adapted under license by your healthcare professional. If you have questions about a medical condition or this instruction, always ask your healthcare professional. [employer] disclaims any warranty or liability for your use of this information.    Substance Use Disorder: Care Instructions  Overview     You can improve your life and health by stopping your use of alcohol or drugs. When you don't drink or use drugs, you may feel and sleep better. You may get along better with your family, friends, and coworkers. There are medicines and programs that can help with substance use disorder.  How can you care for yourself at home?  Here are some ways to help you stay sober and prevent relapse.  If you have been given medicine to help keep you sober or reduce your cravings, be sure to take it exactly as prescribed.  Talk to your doctor about programs that can help you stop using drugs or drinking alcohol.  Do not keep alcohol or drugs in your home.  Plan ahead. Think about what you'll say if other people ask you to drink or use drugs. Try " not to spend time with people who drink or use drugs.  Use the time and money spent on drinking or drugs to do something that's important to you.  Preventing a relapse  Have a plan to deal with relapse. Learn to recognize changes in your thinking that lead you to drink or use drugs. Get help before you start to drink or use drugs again.  Try to stay away from situations, friends, or places that may lead you to drink or use drugs.  If you feel the need to drink alcohol or use drugs again, seek help right away. Call a trusted friend or family member. Some people get support from organizations such as Narcotics Anonymous or [employer] or from treatment facilities.  If you relapse, get help as soon as you can. Some people make a plan with another person that outlines what they want that person to do for them if they relapse. The plan usually includes how to handle the relapse and who to notify in case of relapse.  Don't give up. Remember that a relapse doesn't mean that you have failed. Use the experience to learn the triggers that lead you to drink or use drugs. Then quit again. Recovery is a lifelong process. Many people have several relapses before they are able to quit for good.  Follow-up care is a key part of your treatment and safety. Be sure to make and go to all appointments, and call your doctor if you are having problems. It's also a good idea to know your test results and keep a list of the medicines you take.  When should you call for help?   Call 911  anytime you think you may need emergency care. For example, call if you or someone else:    Has overdosed or has withdrawal signs. Be sure to tell the emergency workers that you are or someone else is using or trying to quit using drugs. Overdose or withdrawal signs may include:  Losing consciousness.  Seizure.  Seeing or hearing things that aren't there (hallucinations).     Is thinking or talking about suicide or harming others.   Where to get help 24  "hours a day, 7 days a week   If you or someone you know talks about suicide, self-harm, a mental health crisis, a substance use crisis, or any other kind of emotional distress, get help right away. You can:    Call the Suicide and Crisis Lifeline at 988.     Call 5-807-988-TALK (1-220.611.9268).     Text HOME to 538499 to access the Crisis Text Line.   Consider saving these numbers in your phone.  Go to HelpMeRent.com for more information or to chat online.  Call your doctor now or seek immediate medical care if:    You are having withdrawal symptoms. These may include nausea or vomiting, sweating, shakiness, and anxiety.   Watch closely for changes in your health, and be sure to contact your doctor if:    You have a relapse.     You need more help or support to stop.   Where can you learn more?  Go to https://www.[employer].net/patiented  Enter H573 in the search box to learn more about \"Substance Use Disorder: Care Instructions.\"  Current as of: August 20, 2024  Content Version: 14.5    0159-3112 [email or web address].   Care instructions adapted under license by your healthcare professional. If you have questions about a medical condition or this instruction, always ask your healthcare professional. [email or web address] disclaims any warranty or liability for your use of this information.       "

## 2025-06-16 ENCOUNTER — TRANSFERRED RECORDS (OUTPATIENT)
Dept: HEALTH INFORMATION MANAGEMENT | Facility: CLINIC | Age: 80
End: 2025-06-16
Payer: COMMERCIAL

## 2025-07-01 ENCOUNTER — HOSPITAL ENCOUNTER (OUTPATIENT)
Dept: MRI IMAGING | Facility: CLINIC | Age: 80
Discharge: HOME | End: 2025-07-01
Attending: INTERNAL MEDICINE
Payer: COMMERCIAL

## 2025-07-01 DIAGNOSIS — R29.898 WEAKNESS OF BOTH LOWER EXTREMITIES: ICD-10-CM

## 2025-07-01 PROCEDURE — 72148 MRI LUMBAR SPINE W/O DYE: CPT

## 2025-07-03 DIAGNOSIS — M48.062 PSEUDOCLAUDICATION: Primary | ICD-10-CM

## 2025-07-03 DIAGNOSIS — M54.16 LUMBAR RADICULOPATHY: ICD-10-CM

## 2025-07-05 ENCOUNTER — PATIENT OUTREACH (OUTPATIENT)
Dept: CARE COORDINATION | Facility: CLINIC | Age: 80
End: 2025-07-05
Payer: COMMERCIAL

## 2025-07-07 ENCOUNTER — PATIENT OUTREACH (OUTPATIENT)
Dept: CARE COORDINATION | Facility: CLINIC | Age: 80
End: 2025-07-07
Payer: COMMERCIAL

## 2025-07-10 ENCOUNTER — MYC REFILL (OUTPATIENT)
Dept: PEDIATRICS | Facility: CLINIC | Age: 80
End: 2025-07-10
Payer: COMMERCIAL

## 2025-07-10 DIAGNOSIS — G47.00 INSOMNIA, UNSPECIFIED TYPE: Primary | ICD-10-CM

## 2025-07-10 DIAGNOSIS — L29.9 PRURITIC DISORDER: ICD-10-CM

## 2025-07-10 RX ORDER — MIRTAZAPINE 7.5 MG/1
7.5 TABLET, FILM COATED ORAL AT BEDTIME
Qty: 90 TABLET | Refills: 1 | Status: SHIPPED | OUTPATIENT
Start: 2025-07-10

## 2025-07-10 RX ORDER — MIRTAZAPINE 15 MG/1
TABLET, FILM COATED ORAL
Status: CANCELLED | OUTPATIENT
Start: 2025-07-10

## 2025-07-10 NOTE — TELEPHONE ENCOUNTER
Per chart review, lower doses of this same medication have been prescribed by PCP in the past. Routing to PCP to review and advise on refill request.     Vin REYES RN 7/10/2025 at 2:47 PM

## 2025-07-10 NOTE — TELEPHONE ENCOUNTER
Clinic RN: Please investigate patient's chart or contact patient if the information cannot be found because the medication is listed as historical or discontinued. Confirm patient is taking this medication. Document findings and route refill encounter to provider for approval or denial.    Any Womack RN on 7/10/2025 at 11:30 AM

## 2025-07-10 NOTE — TELEPHONE ENCOUNTER
Clinic RN: Please investigate patient's chart or contact patient if the information cannot be found because the medication is listed as historical or discontinued. Confirm patient is taking this medication. Document findings and route refill encounter to provider for approval or denial.    Laila Kate RN on 7/10/2025 at 2:30 PM

## 2025-08-11 ENCOUNTER — TRANSFERRED RECORDS (OUTPATIENT)
Dept: HEALTH INFORMATION MANAGEMENT | Facility: CLINIC | Age: 80
End: 2025-08-11
Payer: COMMERCIAL

## (undated) RX ORDER — GENTAMICIN 40 MG/ML
INJECTION, SOLUTION INTRAMUSCULAR; INTRAVENOUS
Status: DISPENSED
Start: 2018-07-23

## (undated) RX ORDER — LIDOCAINE HYDROCHLORIDE 10 MG/ML
INJECTION, SOLUTION EPIDURAL; INFILTRATION; INTRACAUDAL; PERINEURAL
Status: DISPENSED
Start: 2018-07-23